# Patient Record
Sex: FEMALE | Race: WHITE | NOT HISPANIC OR LATINO | Employment: OTHER | URBAN - METROPOLITAN AREA
[De-identification: names, ages, dates, MRNs, and addresses within clinical notes are randomized per-mention and may not be internally consistent; named-entity substitution may affect disease eponyms.]

---

## 2019-12-03 ENCOUNTER — HISTORICAL (OUTPATIENT)
Dept: LAB | Facility: HOSPITAL | Age: 74
End: 2019-12-03

## 2019-12-03 LAB
APPEARANCE, UA: ABNORMAL
BACTERIA SPEC CULT: ABNORMAL /HPF
BILIRUB UR QL STRIP: NEGATIVE
COLOR UR: YELLOW
GLUCOSE (UA): NEGATIVE
HGB UR QL STRIP: NEGATIVE
KETONES UR QL STRIP: NEGATIVE
LEUKOCYTE ESTERASE UR QL STRIP: ABNORMAL
NITRITE UR QL STRIP: NEGATIVE
PH UR STRIP: 5 [PH] (ref 5–9)
PROT UR QL STRIP: NEGATIVE
RBC #/AREA URNS HPF: ABNORMAL /[HPF]
SP GR UR STRIP: 1.01 (ref 1–1.03)
SQUAMOUS EPITHELIAL, UA: 6 /HPF (ref 0–4)
UROBILINOGEN UR STRIP-ACNC: 0.2
WBC #/AREA URNS HPF: 22 /HPF (ref 0–3)

## 2019-12-06 LAB — FINAL CULTURE: NORMAL

## 2020-06-10 LAB
BILIRUB SERPL-MCNC: NEGATIVE MG/DL
BLOOD URINE, POC: NEGATIVE
CLARITY, POC UA: CLEAR
COLOR, POC UA: YELLOW
GLUCOSE UR QL STRIP: NEGATIVE
KETONES UR QL STRIP: NEGATIVE
LEUKOCYTE EST, POC UA: NEGATIVE
NITRITE, POC UA: NEGATIVE
PH, POC UA: 6
PROTEIN, POC: NEGATIVE
SPECIFIC GRAVITY, POC UA: 1.01
UROBILINOGEN, POC UA: NORMAL

## 2020-06-11 ENCOUNTER — HISTORICAL (OUTPATIENT)
Dept: ADMINISTRATIVE | Facility: HOSPITAL | Age: 75
End: 2020-06-11

## 2020-06-15 ENCOUNTER — HISTORICAL (OUTPATIENT)
Dept: ADMINISTRATIVE | Facility: HOSPITAL | Age: 75
End: 2020-06-15

## 2021-04-11 ENCOUNTER — HISTORICAL (OUTPATIENT)
Dept: ADMINISTRATIVE | Facility: HOSPITAL | Age: 76
End: 2021-04-11

## 2021-04-28 ENCOUNTER — HISTORICAL (OUTPATIENT)
Dept: ADMINISTRATIVE | Facility: HOSPITAL | Age: 76
End: 2021-04-28

## 2021-06-17 ENCOUNTER — HISTORICAL (OUTPATIENT)
Dept: ADMINISTRATIVE | Facility: HOSPITAL | Age: 76
End: 2021-06-17

## 2021-07-21 ENCOUNTER — HISTORICAL (OUTPATIENT)
Dept: ADMINISTRATIVE | Facility: HOSPITAL | Age: 76
End: 2021-07-21

## 2021-11-28 ENCOUNTER — HISTORICAL (OUTPATIENT)
Dept: ADMINISTRATIVE | Facility: HOSPITAL | Age: 76
End: 2021-11-28

## 2021-12-01 ENCOUNTER — HISTORICAL (OUTPATIENT)
Dept: ADMINISTRATIVE | Facility: HOSPITAL | Age: 76
End: 2021-12-01

## 2022-04-11 ENCOUNTER — HISTORICAL (OUTPATIENT)
Dept: ADMINISTRATIVE | Facility: HOSPITAL | Age: 77
End: 2022-04-11

## 2022-04-29 VITALS
WEIGHT: 190.06 LBS | HEIGHT: 63 IN | BODY MASS INDEX: 33.68 KG/M2 | SYSTOLIC BLOOD PRESSURE: 130 MMHG | DIASTOLIC BLOOD PRESSURE: 90 MMHG

## 2022-09-21 ENCOUNTER — HISTORICAL (OUTPATIENT)
Dept: ADMINISTRATIVE | Facility: HOSPITAL | Age: 77
End: 2022-09-21

## 2022-12-07 ENCOUNTER — HISTORICAL (OUTPATIENT)
Dept: ADMINISTRATIVE | Facility: HOSPITAL | Age: 77
End: 2022-12-07

## 2023-02-24 ENCOUNTER — HOSPITAL ENCOUNTER (OUTPATIENT)
Dept: RADIOLOGY | Facility: HOSPITAL | Age: 78
Discharge: HOME | End: 2023-02-24
Attending: NURSE PRACTITIONER
Payer: MEDICARE

## 2023-02-24 DIAGNOSIS — M25.512 LEFT SHOULDER PAIN: ICD-10-CM

## 2023-02-24 PROCEDURE — 73030 X-RAY EXAM OF SHOULDER: CPT | Mod: TC,LT

## 2023-03-18 ENCOUNTER — HOSPITAL ENCOUNTER (EMERGENCY)
Facility: HOSPITAL | Age: 78
Discharge: HOME | End: 2023-03-18
Payer: MEDICARE

## 2023-03-18 VITALS
SYSTOLIC BLOOD PRESSURE: 161 MMHG | HEART RATE: 60 BPM | TEMPERATURE: 97 F | WEIGHT: 200 LBS | HEIGHT: 64 IN | DIASTOLIC BLOOD PRESSURE: 91 MMHG | BODY MASS INDEX: 34.15 KG/M2 | RESPIRATION RATE: 18 BRPM | OXYGEN SATURATION: 95 %

## 2023-03-18 DIAGNOSIS — I10 PRIMARY HYPERTENSION: Primary | ICD-10-CM

## 2023-03-18 DIAGNOSIS — G89.29 CHRONIC LEFT SHOULDER PAIN: ICD-10-CM

## 2023-03-18 DIAGNOSIS — M25.512 CHRONIC LEFT SHOULDER PAIN: ICD-10-CM

## 2023-03-18 DIAGNOSIS — I10 HYPERTENSION: ICD-10-CM

## 2023-03-18 LAB
ALBUMIN SERPL-MCNC: 4.4 G/DL (ref 3.4–5)
ALBUMIN/GLOB SERPL: 1.5 RATIO
ALP SERPL-CCNC: 113 UNIT/L (ref 50–144)
ALT SERPL-CCNC: 18 UNIT/L (ref 1–45)
ANION GAP SERPL CALC-SCNC: 5 MEQ/L (ref 2–13)
APPEARANCE UR: CLEAR
AST SERPL-CCNC: 25 UNIT/L (ref 14–36)
BACTERIA #/AREA URNS AUTO: NORMAL /HPF
BASOPHILS # BLD AUTO: 0.03 X10(3)/MCL (ref 0.01–0.08)
BASOPHILS NFR BLD AUTO: 0.6 % (ref 0.1–1.2)
BILIRUB UR QL STRIP.AUTO: NEGATIVE MG/DL
BILIRUBIN DIRECT+TOT PNL SERPL-MCNC: 0.6 MG/DL (ref 0–1)
BUN SERPL-MCNC: 18 MG/DL (ref 7–20)
CALCIUM SERPL-MCNC: 9.3 MG/DL (ref 8.4–10.2)
CHLORIDE SERPL-SCNC: 105 MMOL/L (ref 98–110)
CO2 SERPL-SCNC: 31 MMOL/L (ref 21–32)
COLOR UR AUTO: YELLOW
CREAT SERPL-MCNC: 0.96 MG/DL (ref 0.66–1.25)
CREAT/UREA NIT SERPL: 19 (ref 12–20)
EOSINOPHIL # BLD AUTO: 0.16 X10(3)/MCL (ref 0.04–0.36)
EOSINOPHIL NFR BLD AUTO: 3.1 % (ref 0.7–7)
ERYTHROCYTE [DISTWIDTH] IN BLOOD BY AUTOMATED COUNT: 14.3 % (ref 11–14.5)
GFR SERPLBLD CREATININE-BSD FMLA CKD-EPI: 61 MLS/MIN/1.73/M2
GLOBULIN SER-MCNC: 2.9 GM/DL (ref 2–3.9)
GLUCOSE SERPL-MCNC: 101 MG/DL (ref 70–115)
GLUCOSE UR QL STRIP.AUTO: NEGATIVE MG/DL
HCT VFR BLD AUTO: 41.4 % (ref 36–48)
HGB BLD-MCNC: 13.7 G/DL (ref 11.8–16)
IMM GRANULOCYTES # BLD AUTO: 0.04 X10(3)/MCL (ref 0–0.03)
IMM GRANULOCYTES NFR BLD AUTO: 0.8 % (ref 0–0.5)
KETONES UR QL STRIP.AUTO: NEGATIVE MG/DL
LEUKOCYTE ESTERASE UR QL STRIP.AUTO: NEGATIVE UNIT/L
LYMPHOCYTES # BLD AUTO: 1.7 X10(3)/MCL (ref 1.16–3.74)
LYMPHOCYTES NFR BLD AUTO: 32.8 % (ref 20–55)
MCH RBC QN AUTO: 28.5 PG (ref 27–34)
MCV RBC AUTO: 86.1 FL (ref 79–99)
MEAN CELL HEMOGLOBIN CONCENTRATION (OHS) G/DL: 33.1 G/DL (ref 31–37)
MONOCYTES # BLD AUTO: 0.34 X10(3)/MCL (ref 0.24–0.36)
MONOCYTES NFR BLD AUTO: 6.6 % (ref 4.7–12.5)
NEUTROPHILS # BLD AUTO: 2.92 X10(3)/MCL (ref 1.56–6.13)
NEUTROPHILS NFR BLD AUTO: 56.1 % (ref 37–73)
NITRITE UR QL STRIP.AUTO: NEGATIVE
NRBC BLD AUTO-RTO: 0 % (ref 0–1)
PH UR STRIP.AUTO: 6 [PH]
PLATELET # BLD AUTO: 214 X10(3)/MCL (ref 140–371)
PMV BLD AUTO: 10 FL (ref 9.4–12.4)
POTASSIUM SERPL-SCNC: 3.4 MMOL/L (ref 3.5–5.1)
PROT SERPL-MCNC: 7.3 GM/DL (ref 6.3–8.2)
PROT UR QL STRIP.AUTO: NEGATIVE MG/DL
RBC # BLD AUTO: 4.81 X10(6)/MCL (ref 4–5.1)
RBC #/AREA URNS AUTO: NORMAL /HPF
RBC UR QL AUTO: ABNORMAL UNIT/L
SODIUM SERPL-SCNC: 141 MMOL/L (ref 135–145)
SP GR UR STRIP.AUTO: 1.02
SQUAMOUS #/AREA URNS AUTO: NORMAL /HPF
TROPONIN I SERPL-MCNC: 0.02 NG/ML (ref 0–0.03)
UROBILINOGEN UR STRIP-ACNC: 0.2 MG/DL
WBC # SPEC AUTO: 5.2 X10(3)/MCL (ref 4–11.5)
WBC #/AREA URNS AUTO: NORMAL /HPF

## 2023-03-18 PROCEDURE — 99284 EMERGENCY DEPT VISIT MOD MDM: CPT | Mod: 25

## 2023-03-18 PROCEDURE — 85025 COMPLETE CBC W/AUTO DIFF WBC: CPT

## 2023-03-18 PROCEDURE — 96361 HYDRATE IV INFUSION ADD-ON: CPT

## 2023-03-18 PROCEDURE — 96374 THER/PROPH/DIAG INJ IV PUSH: CPT

## 2023-03-18 PROCEDURE — 80053 COMPREHEN METABOLIC PANEL: CPT

## 2023-03-18 PROCEDURE — 81001 URINALYSIS AUTO W/SCOPE: CPT

## 2023-03-18 PROCEDURE — 36415 COLL VENOUS BLD VENIPUNCTURE: CPT

## 2023-03-18 PROCEDURE — 93005 ELECTROCARDIOGRAM TRACING: CPT

## 2023-03-18 PROCEDURE — 93010 EKG 12-LEAD: ICD-10-PCS | Mod: ,,, | Performed by: INTERNAL MEDICINE

## 2023-03-18 PROCEDURE — 84484 ASSAY OF TROPONIN QUANT: CPT

## 2023-03-18 PROCEDURE — 63600175 PHARM REV CODE 636 W HCPCS

## 2023-03-18 PROCEDURE — 96375 TX/PRO/DX INJ NEW DRUG ADDON: CPT

## 2023-03-18 PROCEDURE — 93010 ELECTROCARDIOGRAM REPORT: CPT | Mod: ,,, | Performed by: INTERNAL MEDICINE

## 2023-03-18 PROCEDURE — 25000003 PHARM REV CODE 250

## 2023-03-18 RX ORDER — HYDROCODONE BITARTRATE AND ACETAMINOPHEN 7.5; 325 MG/1; MG/1
1 TABLET ORAL EVERY 6 HOURS PRN
Qty: 12 TABLET | Refills: 0 | Status: ON HOLD | OUTPATIENT
Start: 2023-03-18 | End: 2023-08-04 | Stop reason: HOSPADM

## 2023-03-18 RX ORDER — HYDROMORPHONE HYDROCHLORIDE 1 MG/ML
0.5 INJECTION, SOLUTION INTRAMUSCULAR; INTRAVENOUS; SUBCUTANEOUS
Status: COMPLETED | OUTPATIENT
Start: 2023-03-18 | End: 2023-03-18

## 2023-03-18 RX ORDER — CLONIDINE HYDROCHLORIDE 0.1 MG/1
0.2 TABLET ORAL
Status: COMPLETED | OUTPATIENT
Start: 2023-03-18 | End: 2023-03-18

## 2023-03-18 RX ORDER — SODIUM CHLORIDE 9 MG/ML
1000 INJECTION, SOLUTION INTRAVENOUS
Status: COMPLETED | OUTPATIENT
Start: 2023-03-18 | End: 2023-03-18

## 2023-03-18 RX ORDER — HYDROCODONE BITARTRATE AND ACETAMINOPHEN 7.5; 325 MG/1; MG/1
1 TABLET ORAL EVERY 6 HOURS PRN
Qty: 12 TABLET | Refills: 0 | Status: SHIPPED | OUTPATIENT
Start: 2023-03-18 | End: 2023-03-18 | Stop reason: SDUPTHER

## 2023-03-18 RX ORDER — LORAZEPAM 2 MG/ML
0.5 INJECTION INTRAMUSCULAR
Status: COMPLETED | OUTPATIENT
Start: 2023-03-18 | End: 2023-03-18

## 2023-03-18 RX ADMIN — HYDROMORPHONE HYDROCHLORIDE 0.5 MG: 1 INJECTION, SOLUTION INTRAMUSCULAR; INTRAVENOUS; SUBCUTANEOUS at 02:03

## 2023-03-18 RX ADMIN — SODIUM CHLORIDE 1000 ML: 9 INJECTION, SOLUTION INTRAVENOUS at 02:03

## 2023-03-18 RX ADMIN — LORAZEPAM 0.5 MG: 2 INJECTION INTRAMUSCULAR; INTRAVENOUS at 02:03

## 2023-03-18 RX ADMIN — CLONIDINE HYDROCHLORIDE 0.2 MG: 0.1 TABLET ORAL at 02:03

## 2023-03-18 NOTE — ED PROVIDER NOTES
Encounter Date: 3/18/2023       History     Chief Complaint   Patient presents with    Shoulder Pain     C/o weakness, fatigue and high blood pressure since waking up this morning, also c/o left shoulder pain x 1 month, pt is under care of Dr Sood for shoulder and is awaiting cardiac clearance to get a scope of shoulder down.    Hypertension     78-year-old female presents complaining of elevated blood pressure today.  She has been dealing with a painful left shoulder for over a month.  She is preparing for a arthroscopy from Dr. Sood.  She has not been sleeping well.  She is on no prescription pain medications, due to previous side effects of confusion.  She denies chest pain or shortness of breath.    The history is provided by the patient and a relative.   Review of patient's allergies indicates:  No Known Allergies  Past Medical History:   Diagnosis Date    Fluid retention     Hypercholesterolemia     Hypertension     Pacemaker     Paroxysmal atrial fibrillation      Past Surgical History:   Procedure Laterality Date    HYSTERECTOMY       History reviewed. No pertinent family history.  Social History     Tobacco Use    Smoking status: Never    Smokeless tobacco: Never   Substance Use Topics    Alcohol use: Never    Drug use: Never     Review of Systems   Constitutional:  Positive for activity change.   Respiratory:  Negative for shortness of breath.    Cardiovascular:  Negative for chest pain and palpitations.   Psychiatric/Behavioral:  Positive for sleep disturbance. The patient is nervous/anxious.    All other systems reviewed and are negative.    Physical Exam     Initial Vitals [03/18/23 1349]   BP Pulse Resp Temp SpO2   (!) 184/118 90 18 96.9 °F (36.1 °C) 99 %      MAP       --         Physical Exam    Nursing note and vitals reviewed.  Constitutional: Vital signs are normal. She appears well-developed and well-nourished. She is cooperative.   HENT:   Head: Normocephalic and atraumatic.   Mucous  membranes appear a bit dry   Eyes: Conjunctivae, EOM and lids are normal. Pupils are equal, round, and reactive to light.   Neck: Trachea normal. Neck supple.   Normal range of motion.  Cardiovascular:  Normal rate, regular rhythm, normal heart sounds and intact distal pulses.           Pulmonary/Chest: Breath sounds normal.   Abdominal: Bowel sounds are normal.   Musculoskeletal:      Cervical back: Normal, normal range of motion and neck supple.      Lumbar back: Normal.      Comments: Left shoulder is very painful with any movement.     Neurological: She is alert and oriented to person, place, and time. She has normal strength. Coordination normal.   Skin: Skin is warm, dry and intact. Capillary refill takes less than 2 seconds.   Psychiatric: She has a normal mood and affect. Her speech is normal and behavior is normal. Judgment and thought content normal. Cognition and memory are normal.       ED Course   Procedures  Labs Reviewed   COMPREHENSIVE METABOLIC PANEL - Abnormal; Notable for the following components:       Result Value    Potassium Level 3.4 (*)     All other components within normal limits   URINALYSIS, REFLEX TO URINE CULTURE - Abnormal; Notable for the following components:    Blood, UA Trace-Intact (*)     All other components within normal limits    Narrative:      URINE STABILITY IS 2 HOURS AT ROOM TEMP OR    SIX HOURS REFRIGERATED. PERFORMING TESTING ON    SPECIMENS GREATER THAN THIS AGE MAY AFFECT THE    FOLLOWING TESTS:    PH          SPECIFIC GRAVITY           BLOOD    CLARITY     BILIRUBIN               UROBILINOGEN   CBC WITH DIFFERENTIAL - Abnormal; Notable for the following components:    IG# 0.04 (*)     IG% 0.8 (*)     All other components within normal limits   TROPONIN I - Normal   CBC W/ AUTO DIFFERENTIAL    Narrative:     The following orders were created for panel order CBC auto differential.  Procedure                               Abnormality         Status                      ---------                               -----------         ------                     CBC with Differential[[id number]]        Abnormal            Final result                 Please view results for these tests on the individual orders.   URINALYSIS, MICROSCOPIC        ECG Results              EKG 12-lead (Preliminary result)  Result time 03/18/23 14:35:48      Wet Read by Ron Quijano MD (03/18/23 14:35:48, [facility] Emergency Medicine)    Paced rhythm, heart rate 63, left bundle branch block, no evidence of STEMI                                  Imaging Results    None          Medications   HYDROmorphone injection 0.5 mg (0.5 mg Intravenous Given 3/18/23 1427)   LORazepam injection 0.5 mg (0.5 mg Intravenous Given 3/18/23 1430)   cloNIDine tablet 0.2 mg (0.2 mg Oral Given 3/18/23 1426)   0.9%  NaCl infusion (1,000 mLs Intravenous New Bag 3/18/23 1423)     Medical Decision Making:   Initial Assessment:   Shoulder pain, insomnia, elevated blood pressure  Differential Diagnosis:   Rotator cuff injury, dehydration, anxiety  Clinical Tests:   Lab Tests: Ordered  Medical Tests: Ordered  ED Management:  Clonidine p.o., Dilaudid/Ativan IV, IVF's  EKG, labs  Patient comfortable, blood pressure improved                        Clinical Impression:   Final diagnoses:  [I10] Hypertension  [I10] Primary hypertension (Primary)  [M25.512, G89.29] Chronic left shoulder pain        ED Disposition Condition    Discharge Good          ED Prescriptions       Medication Sig Dispense Start Date End Date Auth. Provider    HYDROcodone-acetaminophen (NORCO) 7.5-325 mg per tablet Take 1 tablet by mouth every 6 (six) hours as needed for Pain. 12 tablet 3/18/2023 -- Ron Quijano MD          Follow-up Information       Follow up With Specialties Details Why Contact Info    PCP  Call in 2 days               Ron Quijano MD  03/18/23 8278

## 2023-04-13 ENCOUNTER — HOSPITAL ENCOUNTER (OUTPATIENT)
Dept: PREADMISSION TESTING | Facility: HOSPITAL | Age: 78
Discharge: HOME | End: 2023-04-13
Payer: MEDICARE

## 2023-04-13 ENCOUNTER — HOSPITAL ENCOUNTER (OUTPATIENT)
Dept: RADIOLOGY | Facility: HOSPITAL | Age: 78
Discharge: HOME | End: 2023-04-13
Attending: SPECIALIST
Payer: MEDICARE

## 2023-04-13 VITALS — HEIGHT: 64 IN | WEIGHT: 200 LBS | BODY MASS INDEX: 34.15 KG/M2

## 2023-04-13 DIAGNOSIS — M25.512 LEFT SHOULDER PAIN, UNSPECIFIED CHRONICITY: Primary | ICD-10-CM

## 2023-04-13 DIAGNOSIS — M25.512 LEFT SHOULDER PAIN, UNSPECIFIED CHRONICITY: ICD-10-CM

## 2023-04-13 LAB
ANION GAP SERPL CALC-SCNC: 5 MEQ/L (ref 2–13)
APPEARANCE UR: ABNORMAL
BACTERIA #/AREA URNS AUTO: ABNORMAL /HPF
BASOPHILS # BLD AUTO: 0.04 X10(3)/MCL (ref 0.01–0.08)
BASOPHILS NFR BLD AUTO: 0.8 % (ref 0.1–1.2)
BILIRUB UR QL STRIP.AUTO: NEGATIVE MG/DL
BUN SERPL-MCNC: 17 MG/DL (ref 7–20)
CALCIUM SERPL-MCNC: 9.3 MG/DL (ref 8.4–10.2)
CHLORIDE SERPL-SCNC: 105 MMOL/L (ref 98–110)
CO2 SERPL-SCNC: 31 MMOL/L (ref 21–32)
COLOR UR AUTO: YELLOW
CREAT SERPL-MCNC: 0.91 MG/DL (ref 0.66–1.25)
CREAT/UREA NIT SERPL: 19 (ref 12–20)
EOSINOPHIL # BLD AUTO: 0.2 X10(3)/MCL (ref 0.04–0.36)
EOSINOPHIL NFR BLD AUTO: 4 % (ref 0.7–7)
ERYTHROCYTE [DISTWIDTH] IN BLOOD BY AUTOMATED COUNT: 14.2 % (ref 11–14.5)
GFR SERPLBLD CREATININE-BSD FMLA CKD-EPI: 65 MLS/MIN/1.73/M2
GLUCOSE SERPL-MCNC: 106 MG/DL (ref 70–115)
GLUCOSE UR QL STRIP.AUTO: NEGATIVE MG/DL
HCT VFR BLD AUTO: 40.4 % (ref 36–48)
HGB BLD-MCNC: 13.1 G/DL (ref 11.8–16)
IMM GRANULOCYTES # BLD AUTO: 0.14 X10(3)/MCL (ref 0–0.03)
IMM GRANULOCYTES NFR BLD AUTO: 2.8 % (ref 0–0.5)
KETONES UR QL STRIP.AUTO: NEGATIVE MG/DL
LEUKOCYTE ESTERASE UR QL STRIP.AUTO: ABNORMAL UNIT/L
LYMPHOCYTES # BLD AUTO: 1.66 X10(3)/MCL (ref 1.16–3.74)
LYMPHOCYTES NFR BLD AUTO: 33.5 % (ref 20–55)
MCH RBC QN AUTO: 27.7 PG (ref 27–34)
MCV RBC AUTO: 85.4 FL (ref 79–99)
MEAN CELL HEMOGLOBIN CONCENTRATION (OHS) G/DL: 32.4 G/DL (ref 31–37)
MONOCYTES # BLD AUTO: 0.32 X10(3)/MCL (ref 0.24–0.36)
MONOCYTES NFR BLD AUTO: 6.5 % (ref 4.7–12.5)
NEUTROPHILS # BLD AUTO: 2.6 X10(3)/MCL (ref 1.56–6.13)
NEUTROPHILS NFR BLD AUTO: 52.4 % (ref 37–73)
NITRITE UR QL STRIP.AUTO: NEGATIVE
NRBC BLD AUTO-RTO: 0 % (ref 0–1)
PH UR STRIP.AUTO: 7 [PH]
PLATELET # BLD AUTO: 205 X10(3)/MCL (ref 140–371)
PMV BLD AUTO: 10.1 FL (ref 9.4–12.4)
POTASSIUM SERPL-SCNC: 3.9 MMOL/L (ref 3.5–5.1)
PROT UR QL STRIP.AUTO: NEGATIVE MG/DL
RBC # BLD AUTO: 4.73 X10(6)/MCL (ref 4–5.1)
RBC UR QL AUTO: NEGATIVE UNIT/L
SODIUM SERPL-SCNC: 141 MMOL/L (ref 135–145)
SP GR UR STRIP.AUTO: 1.02
SQUAMOUS #/AREA URNS AUTO: ABNORMAL /HPF
UROBILINOGEN UR STRIP-ACNC: 0.2 MG/DL
WBC # SPEC AUTO: 5 X10(3)/MCL (ref 4–11.5)
WBC #/AREA URNS AUTO: ABNORMAL /HPF

## 2023-04-13 PROCEDURE — 85025 COMPLETE CBC W/AUTO DIFF WBC: CPT | Performed by: SPECIALIST

## 2023-04-13 PROCEDURE — 80048 BASIC METABOLIC PNL TOTAL CA: CPT | Performed by: SPECIALIST

## 2023-04-13 PROCEDURE — 71046 X-RAY EXAM CHEST 2 VIEWS: CPT | Mod: TC

## 2023-04-13 PROCEDURE — 87088 URINE BACTERIA CULTURE: CPT | Performed by: SPECIALIST

## 2023-04-13 PROCEDURE — 81001 URINALYSIS AUTO W/SCOPE: CPT | Performed by: SPECIALIST

## 2023-04-13 PROCEDURE — 93010 ELECTROCARDIOGRAM REPORT: CPT | Mod: ,,, | Performed by: INTERNAL MEDICINE

## 2023-04-13 PROCEDURE — 93005 ELECTROCARDIOGRAM TRACING: CPT

## 2023-04-13 PROCEDURE — 93010 EKG 12-LEAD: ICD-10-PCS | Mod: ,,, | Performed by: INTERNAL MEDICINE

## 2023-04-13 RX ORDER — DILTIAZEM HYDROCHLORIDE 120 MG/1
120 TABLET, FILM COATED ORAL EVERY 12 HOURS
COMMUNITY
Start: 2023-03-07

## 2023-04-13 RX ORDER — HYDROXYZINE PAMOATE 25 MG/1
50 CAPSULE ORAL 2 TIMES DAILY
COMMUNITY
Start: 2023-03-07

## 2023-04-13 RX ORDER — SOTALOL HYDROCHLORIDE 240 MG/1
240 TABLET ORAL 2 TIMES DAILY
COMMUNITY
Start: 2023-03-07

## 2023-04-13 RX ORDER — SODIUM CHLORIDE, SODIUM LACTATE, POTASSIUM CHLORIDE, CALCIUM CHLORIDE 600; 310; 30; 20 MG/100ML; MG/100ML; MG/100ML; MG/100ML
INJECTION, SOLUTION INTRAVENOUS CONTINUOUS
Status: CANCELLED | OUTPATIENT
Start: 2023-04-21

## 2023-04-13 RX ORDER — RIVAROXABAN 20 MG/1
20 TABLET, FILM COATED ORAL NIGHTLY
COMMUNITY
Start: 2023-03-03

## 2023-04-13 RX ORDER — ICOSAPENT ETHYL 1000 MG/1
1 CAPSULE ORAL 2 TIMES DAILY
COMMUNITY
Start: 2023-03-07

## 2023-04-13 RX ORDER — ESCITALOPRAM OXALATE 10 MG/1
TABLET ORAL
COMMUNITY
Start: 2023-03-07

## 2023-04-13 RX ORDER — BENAZEPRIL HYDROCHLORIDE 20 MG/1
20 TABLET ORAL 2 TIMES DAILY
COMMUNITY
Start: 2023-03-03

## 2023-04-13 RX ORDER — POTASSIUM CHLORIDE 750 MG/1
1 TABLET, EXTENDED RELEASE ORAL DAILY
COMMUNITY
Start: 2023-03-07

## 2023-04-13 RX ORDER — FUROSEMIDE 40 MG/1
40 TABLET ORAL DAILY
COMMUNITY
Start: 2023-03-07

## 2023-04-13 RX ORDER — DIGOXIN 125 MCG
125 TABLET ORAL DAILY
COMMUNITY
Start: 2023-03-07

## 2023-04-13 NOTE — DISCHARGE INSTRUCTIONS

## 2023-04-15 LAB — BACTERIA UR CULT: ABNORMAL

## 2023-05-02 ENCOUNTER — HOSPITAL ENCOUNTER (OUTPATIENT)
Dept: RADIOLOGY | Facility: HOSPITAL | Age: 78
Discharge: HOME | End: 2023-05-02
Attending: SPECIALIST
Payer: MEDICARE

## 2023-05-02 DIAGNOSIS — M75.102 ROTATOR CUFF SYNDROME OF LEFT SHOULDER: ICD-10-CM

## 2023-05-02 PROCEDURE — 73200 CT UPPER EXTREMITY W/O DYE: CPT | Mod: TC,LT

## 2023-05-03 ENCOUNTER — NURSE TRIAGE (OUTPATIENT)
Dept: ADMINISTRATIVE | Facility: CLINIC | Age: 78
End: 2023-05-03
Payer: MEDICARE

## 2023-05-04 ENCOUNTER — HOSPITAL ENCOUNTER (EMERGENCY)
Facility: HOSPITAL | Age: 78
Discharge: HOME | End: 2023-05-04
Attending: FAMILY MEDICINE
Payer: MEDICARE

## 2023-05-04 VITALS
RESPIRATION RATE: 18 BRPM | TEMPERATURE: 98 F | DIASTOLIC BLOOD PRESSURE: 78 MMHG | SYSTOLIC BLOOD PRESSURE: 133 MMHG | BODY MASS INDEX: 33.32 KG/M2 | HEIGHT: 65 IN | OXYGEN SATURATION: 96 % | WEIGHT: 200 LBS | HEART RATE: 88 BPM

## 2023-05-04 DIAGNOSIS — R00.2 PALPITATIONS: ICD-10-CM

## 2023-05-04 DIAGNOSIS — I48.91 ATRIAL FIBRILLATION WITH RVR: Primary | ICD-10-CM

## 2023-05-04 DIAGNOSIS — I48.0 PAROXYSMAL ATRIAL FIBRILLATION: ICD-10-CM

## 2023-05-04 LAB
ALBUMIN SERPL-MCNC: 3.8 G/DL (ref 3.4–5)
ALBUMIN/GLOB SERPL: 1.3 RATIO
ALP SERPL-CCNC: 100 UNIT/L (ref 50–144)
ALT SERPL-CCNC: 22 UNIT/L (ref 1–45)
ANION GAP SERPL CALC-SCNC: 5 MEQ/L (ref 2–13)
AST SERPL-CCNC: 27 UNIT/L (ref 14–36)
BASOPHILS # BLD AUTO: 0.05 X10(3)/MCL (ref 0.01–0.08)
BASOPHILS NFR BLD AUTO: 0.7 % (ref 0.1–1.2)
BILIRUBIN DIRECT+TOT PNL SERPL-MCNC: 0.4 MG/DL (ref 0–1)
BUN SERPL-MCNC: 19 MG/DL (ref 7–20)
CALCIUM SERPL-MCNC: 9.5 MG/DL (ref 8.4–10.2)
CHLORIDE SERPL-SCNC: 107 MMOL/L (ref 98–110)
CO2 SERPL-SCNC: 30 MMOL/L (ref 21–32)
CREAT SERPL-MCNC: 1.13 MG/DL (ref 0.66–1.25)
CREAT/UREA NIT SERPL: 17 (ref 12–20)
EOSINOPHIL # BLD AUTO: 0.22 X10(3)/MCL (ref 0.04–0.36)
EOSINOPHIL NFR BLD AUTO: 3.2 % (ref 0.7–7)
ERYTHROCYTE [DISTWIDTH] IN BLOOD BY AUTOMATED COUNT: 14.6 % (ref 11–14.5)
GFR SERPLBLD CREATININE-BSD FMLA CKD-EPI: 50 MLS/MIN/1.73/M2
GLOBULIN SER-MCNC: 3 GM/DL (ref 2–3.9)
GLUCOSE SERPL-MCNC: 114 MG/DL (ref 70–115)
HCT VFR BLD AUTO: 40 % (ref 36–48)
HGB BLD-MCNC: 13.1 G/DL (ref 11.8–16)
IMM GRANULOCYTES # BLD AUTO: 0.06 X10(3)/MCL (ref 0–0.03)
IMM GRANULOCYTES NFR BLD AUTO: 0.9 % (ref 0–0.5)
LYMPHOCYTES # BLD AUTO: 1.97 X10(3)/MCL (ref 1.16–3.74)
LYMPHOCYTES NFR BLD AUTO: 28.7 % (ref 20–55)
MCH RBC QN AUTO: 28.1 PG (ref 27–34)
MCHC RBC AUTO-ENTMCNC: 32.8 G/DL (ref 31–37)
MCV RBC AUTO: 85.7 FL (ref 79–99)
MONOCYTES # BLD AUTO: 0.31 X10(3)/MCL (ref 0.24–0.36)
MONOCYTES NFR BLD AUTO: 4.5 % (ref 4.7–12.5)
NEUTROPHILS # BLD AUTO: 4.26 X10(3)/MCL (ref 1.56–6.13)
NEUTROPHILS NFR BLD AUTO: 62 % (ref 37–73)
NRBC BLD AUTO-RTO: 0 % (ref 0–1)
PLATELET # BLD AUTO: 206 X10(3)/MCL (ref 140–371)
PMV BLD AUTO: 10 FL (ref 9.4–12.4)
POTASSIUM SERPL-SCNC: 3.5 MMOL/L (ref 3.5–5.1)
PROT SERPL-MCNC: 6.8 GM/DL (ref 6.3–8.2)
RBC # BLD AUTO: 4.67 X10(6)/MCL (ref 4–5.1)
SODIUM SERPL-SCNC: 142 MMOL/L (ref 135–145)
TROPONIN I SERPL-MCNC: 0.02 NG/ML (ref 0–0.03)
WBC # SPEC AUTO: 6.87 X10(3)/MCL (ref 4–11.5)

## 2023-05-04 PROCEDURE — 84484 ASSAY OF TROPONIN QUANT: CPT | Performed by: FAMILY MEDICINE

## 2023-05-04 PROCEDURE — 80053 COMPREHEN METABOLIC PANEL: CPT | Performed by: FAMILY MEDICINE

## 2023-05-04 PROCEDURE — 36415 COLL VENOUS BLD VENIPUNCTURE: CPT | Performed by: FAMILY MEDICINE

## 2023-05-04 PROCEDURE — 99284 EMERGENCY DEPT VISIT MOD MDM: CPT | Mod: 25

## 2023-05-04 PROCEDURE — 85025 COMPLETE CBC W/AUTO DIFF WBC: CPT | Performed by: FAMILY MEDICINE

## 2023-05-04 NOTE — TELEPHONE ENCOUNTER
Reports feeling as if heart is racing with pulse ox giving many different readings. +Weakness. Reports trip and fall earlier today. Advised, per protocol. Verbalizes understanding.     Reason for Disposition   Unable to walk, or can only walk with assistance (e.g., requires support)    Additional Information   Negative: Passed out (i.e., lost consciousness, collapsed and was not responding)   Negative: Shock suspected (e.g., cold/pale/clammy skin, too weak to stand, low BP, rapid pulse)   Negative: Difficult to awaken or acting confused (e.g., disoriented, slurred speech)   Negative: Visible sweat on face or sweat dripping down face    Protocols used: Heart Rate and Heartbeat [id number]

## 2023-05-04 NOTE — ED PROVIDER NOTES
Encounter Date: 5/3/2023       History     Chief Complaint   Patient presents with    Palpitations     C/O PALPITATIONS AT HOME PTA. PT GIVEN 300MG AMIODARONE IV BY EMS.      78-year-old white female brought to emergency room per [facility] complaining of heart palpitations prior to admission.  Patient denies any chest pain or shortness of breath.  Patient denies any nausea vomiting diaphoresis.  Patient states he is got a history of atrial fibrillation and also has a pacemaker.  Patient states she is taking her medications.  Patient states she feels better upon arrival to the emergency room but does feel the palpitations off and on.    The history is provided by the patient and the EMS personnel.   Review of patient's allergies indicates:  No Known Allergies  Past Medical History:   Diagnosis Date    Fluid retention     Hypercholesterolemia     Hypertension     Pacemaker     Paroxysmal atrial fibrillation      Past Surgical History:   Procedure Laterality Date    HYSTERECTOMY      JOINT REPLACEMENT Right     X2     No family history on file.  Social History     Tobacco Use    Smoking status: Never    Smokeless tobacco: Never   Substance Use Topics    Alcohol use: Never    Drug use: Never     Review of Systems   Respiratory:  Negative for shortness of breath.    Cardiovascular:  Positive for palpitations. Negative for chest pain.   All other systems reviewed and are negative.    Physical Exam     Initial Vitals [05/04/23 0000]   BP Pulse Resp Temp SpO2   117/82 96 18 98.2 °F (36.8 °C) 99 %      MAP       --         Physical Exam    Nursing note and vitals reviewed.  Constitutional:   Pleasant white female alert in no acute distress.   HENT:   Head is atraumatic and normocephalic.  Oropharynx is clear with moist mucous membranes.  Nose is clear with no discharge.   Neck:   Neck is supple with full range of motion with no cervical lymphadenopathy.   Cardiovascular:            Heart with irregular irregular rate and  rhythm no murmur.   Pulmonary/Chest:   Lungs are clear to auscultation bilaterally.   Abdominal:   Abdomen with positive bowel sounds throughout.  Abdomen is soft and nontender with no guarding or rebound.   Musculoskeletal:      Comments: Extremities with good range of motion with no clubbing cyanosis or edema.     Neurological:   Patient is alert and oriented x3.  Bilateral upper and lower extremities with normal strength and sensation.   Skin:   Skin is warm and dry.   Psychiatric: She has a normal mood and affect. Her behavior is normal. Thought content normal.       ED Course   Procedures  Labs Reviewed   CBC WITH DIFFERENTIAL - Abnormal; Notable for the following components:       Result Value    RDW 14.6 (*)     Mono % 4.5 (*)     IG# 0.06 (*)     IG% 0.9 (*)     All other components within normal limits   TROPONIN I - Normal   CBC W/ AUTO DIFFERENTIAL    Narrative:     The following orders were created for panel order CBC auto differential.  Procedure                               Abnormality         Status                     ---------                               -----------         ------                     CBC with Differential[[id number]]        Abnormal            Final result                 Please view results for these tests on the individual orders.   COMPREHENSIVE METABOLIC PANEL     EKG Readings: (Independently Interpreted)   Initial Reading: No STEMI. Previous EKG: Compared with most recent EKG Rhythm: Atrial Fibrillation. Heart Rate: 93. Ectopy: No Ectopy. Conduction: LBBB. Axis: Left Axis Deviation. Other Findings: Prolonged QT Interval. Clinical Impression: Atrial Fibrillation with LBBB     Imaging Results              X-Ray Chest AP Portable (Preliminary result)  Result time 05/04/23 01:05:53      Wet Read by Gregory Saldaña MD (05/04/23 01:05:53, [facility])    No obvious infiltrates noted.                                     Medications -  No data to display  Medical Decision Making:   Initial Assessment:   Paroxysmal atrial fibrillation.  Differential Diagnosis:   Pacemaker firing,  Clinical Tests:   Lab Tests: Ordered and Reviewed  The following lab test(s) were unremarkable: CBC, CMP and Troponin  Radiological Study: Ordered and Reviewed  Medical Tests: Ordered and Reviewed  ED Management:  Patient states he is resting comfortably and has no pain or palpitations this time.  We will review x-ray results and lab results with patient.  Patient states he is comfortable going home at this time and will follow up with Dr. Mckeon her cardiologist in [place].  Patient instructed to return to the ER for any problems.                        Clinical Impression:   Final diagnoses:  [R00.2] Palpitations  [I48.91] Atrial fibrillation with RVR (Primary)  [I48.0] Paroxysmal atrial fibrillation        ED Disposition Condition    Discharge Stable          ED Prescriptions    None       Follow-up Information       Follow up With Specialties Details Why Contact Info    Mery Sanon NP Family Medicine Schedule an appointment as soon as possible for a visit   [address] [place] [address] [phone]      Follow up with Dr. Mckeon cardiologist.                 Gregory Saldaña MD  05/04/23 0108

## 2023-08-01 ENCOUNTER — HOSPITAL ENCOUNTER (OUTPATIENT)
Dept: RADIOLOGY | Facility: HOSPITAL | Age: 78
Discharge: HOME | End: 2023-08-01
Attending: SPECIALIST
Payer: MEDICARE

## 2023-08-01 ENCOUNTER — HOSPITAL ENCOUNTER (OUTPATIENT)
Dept: PREADMISSION TESTING | Facility: HOSPITAL | Age: 78
Discharge: HOME | End: 2023-08-01
Attending: SPECIALIST
Payer: MEDICARE

## 2023-08-01 VITALS — BODY MASS INDEX: 33.32 KG/M2 | WEIGHT: 200 LBS | HEIGHT: 65 IN

## 2023-08-01 DIAGNOSIS — Z01.818 PRE-OP EXAM: Primary | ICD-10-CM

## 2023-08-01 LAB
ANION GAP SERPL CALC-SCNC: 9 MEQ/L (ref 2–13)
APPEARANCE UR: CLEAR
BASOPHILS # BLD AUTO: 0.03 X10(3)/MCL (ref 0.01–0.08)
BASOPHILS NFR BLD AUTO: 0.6 % (ref 0.1–1.2)
BILIRUB UR QL STRIP.AUTO: NEGATIVE
BUN SERPL-MCNC: 14 MG/DL (ref 7–20)
CALCIUM SERPL-MCNC: 9.9 MG/DL (ref 8.4–10.2)
CHLORIDE SERPL-SCNC: 102 MMOL/L (ref 98–110)
CO2 SERPL-SCNC: 30 MMOL/L (ref 21–32)
COLOR UR: YELLOW
CREAT SERPL-MCNC: 0.92 MG/DL (ref 0.66–1.25)
CREAT/UREA NIT SERPL: 15 (ref 12–20)
EOSINOPHIL # BLD AUTO: 0.16 X10(3)/MCL (ref 0.04–0.36)
EOSINOPHIL NFR BLD AUTO: 3.3 % (ref 0.7–7)
ERYTHROCYTE [DISTWIDTH] IN BLOOD BY AUTOMATED COUNT: 14.7 % (ref 11–14.5)
GFR SERPLBLD CREATININE-BSD FMLA CKD-EPI: 64 MLS/MIN/1.73/M2
GLUCOSE SERPL-MCNC: 106 MG/DL (ref 70–115)
GLUCOSE UR QL STRIP.AUTO: NEGATIVE
HCT VFR BLD AUTO: 40.1 % (ref 36–48)
HGB BLD-MCNC: 13.1 G/DL (ref 11.8–16)
IMM GRANULOCYTES # BLD AUTO: 0.04 X10(3)/MCL (ref 0–0.03)
IMM GRANULOCYTES NFR BLD AUTO: 0.8 % (ref 0–0.5)
KETONES UR QL STRIP.AUTO: NEGATIVE
LEUKOCYTE ESTERASE UR QL STRIP.AUTO: NEGATIVE
LYMPHOCYTES # BLD AUTO: 1.65 X10(3)/MCL (ref 1.16–3.74)
LYMPHOCYTES NFR BLD AUTO: 33.7 % (ref 20–55)
MCH RBC QN AUTO: 28.1 PG (ref 27–34)
MCHC RBC AUTO-ENTMCNC: 32.7 G/DL (ref 31–37)
MCV RBC AUTO: 85.9 FL (ref 79–99)
MONOCYTES # BLD AUTO: 0.37 X10(3)/MCL (ref 0.24–0.36)
MONOCYTES NFR BLD AUTO: 7.6 % (ref 4.7–12.5)
NEUTROPHILS # BLD AUTO: 2.65 X10(3)/MCL (ref 1.56–6.13)
NEUTROPHILS NFR BLD AUTO: 54 % (ref 37–73)
NITRITE UR QL STRIP.AUTO: NEGATIVE
NRBC BLD AUTO-RTO: 0 %
PH UR STRIP.AUTO: 6.5 [PH]
PLATELET # BLD AUTO: 203 X10(3)/MCL (ref 140–371)
PMV BLD AUTO: 10.1 FL (ref 9.4–12.4)
POTASSIUM SERPL-SCNC: 3.7 MMOL/L (ref 3.5–5.1)
PROT UR QL STRIP.AUTO: NEGATIVE
RBC # BLD AUTO: 4.67 X10(6)/MCL (ref 4–5.1)
RBC UR QL AUTO: NEGATIVE
SODIUM SERPL-SCNC: 141 MMOL/L (ref 135–145)
SP GR UR STRIP.AUTO: 1.02
UROBILINOGEN UR STRIP-ACNC: 0.2
WBC # SPEC AUTO: 4.9 X10(3)/MCL (ref 4–11.5)

## 2023-08-01 PROCEDURE — 93005 ELECTROCARDIOGRAM TRACING: CPT

## 2023-08-01 PROCEDURE — 93010 EKG 12-LEAD: ICD-10-PCS | Mod: ,,, | Performed by: INTERNAL MEDICINE

## 2023-08-01 PROCEDURE — 81003 URINALYSIS AUTO W/O SCOPE: CPT | Performed by: SPECIALIST

## 2023-08-01 PROCEDURE — 80048 BASIC METABOLIC PNL TOTAL CA: CPT | Performed by: SPECIALIST

## 2023-08-01 PROCEDURE — 85025 COMPLETE CBC W/AUTO DIFF WBC: CPT | Performed by: SPECIALIST

## 2023-08-01 PROCEDURE — 71046 X-RAY EXAM CHEST 2 VIEWS: CPT | Mod: TC

## 2023-08-01 PROCEDURE — 93010 ELECTROCARDIOGRAM REPORT: CPT | Mod: ,,, | Performed by: INTERNAL MEDICINE

## 2023-08-01 RX ORDER — ACETAMINOPHEN 500 MG
1000 TABLET ORAL NIGHTLY
COMMUNITY

## 2023-08-01 NOTE — DISCHARGE INSTRUCTIONS

## 2023-08-03 ENCOUNTER — ANESTHESIA EVENT (OUTPATIENT)
Dept: SURGERY | Facility: HOSPITAL | Age: 78
End: 2023-08-03
Payer: MEDICARE

## 2023-08-03 NOTE — ANESTHESIA PREPROCEDURE EVALUATION
08/03/2023  Patsy H Cantu is a 78 y.o., female.      Pre-op Assessment    I have reviewed the Patient Summary Reports.     I have reviewed the Nursing Notes. I have reviewed the NPO Status.   I have reviewed the Medications.     Review of Systems  Anesthesia Hx:  No problems with previous Anesthesia  Denies Family Hx of Anesthesia complications.   Denies Personal Hx of Anesthesia complications.   Hematology/Oncology:  Hematology Normal   Oncology Normal     EENT/Dental:EENT/Dental Normal   Cardiovascular:   Exercise tolerance: poor Pacemaker Hypertension Dysrhythmias atrial fibrillation hyperlipidemia    Pulmonary:   Sleep Apnea    Renal/:   Chronic Renal Disease, CKD    Hepatic/GI:  Hepatic/GI Normal    Musculoskeletal:  Musculoskeletal Normal    Neurological:   TIA,    Endocrine:  Endocrine Normal  Obesity / BMI > 30  Dermatological:  Skin Normal    Psych:   anxiety depression          Physical Exam  General: Well nourished, Cooperative, Alert and Oriented    Airway:  Mallampati: II / II  Mouth Opening: Normal  TM Distance: Normal  Tongue: Large  Neck ROM: Normal ROM    Dental:  Dentures        Anesthesia Plan  Type of Anesthesia, risks & benefits discussed:    Anesthesia Type: Gen ETT  Intra-op Monitoring Plan: Standard ASA Monitors  Post Op Pain Control Plan: multimodal analgesia  Induction:  IV  Airway Plan: Direct  Informed Consent: Informed consent signed with the Patient and all parties understand the risks and agree with anesthesia plan.  All questions answered. Patient consented to blood products? Yes  ASA Score: 4  Day of Surgery Review of History & Physical: H&P Update referred to the surgeon/provider.I have interviewed and examined the patient. I have reviewed the patient's H&P dated: There are no significant changes.     Ready For Surgery From Anesthesia Perspective.     .

## 2023-08-04 ENCOUNTER — ANESTHESIA (OUTPATIENT)
Dept: SURGERY | Facility: HOSPITAL | Age: 78
End: 2023-08-04
Payer: MEDICARE

## 2023-08-04 ENCOUNTER — HOSPITAL ENCOUNTER (OUTPATIENT)
Facility: HOSPITAL | Age: 78
Discharge: HOME | End: 2023-08-04
Attending: SPECIALIST | Admitting: SPECIALIST
Payer: MEDICARE

## 2023-08-04 VITALS
HEART RATE: 62 BPM | WEIGHT: 200 LBS | RESPIRATION RATE: 18 BRPM | SYSTOLIC BLOOD PRESSURE: 136 MMHG | BODY MASS INDEX: 33.28 KG/M2 | TEMPERATURE: 98 F | DIASTOLIC BLOOD PRESSURE: 78 MMHG | OXYGEN SATURATION: 98 %

## 2023-08-04 DIAGNOSIS — M25.512 LEFT SHOULDER PAIN, UNSPECIFIED CHRONICITY: ICD-10-CM

## 2023-08-04 PROBLEM — M75.102 ROTATOR CUFF SYNDROME OF LEFT SHOULDER: Status: ACTIVE | Noted: 2023-08-04

## 2023-08-04 PROCEDURE — 71000033 HC RECOVERY, INTIAL HOUR: Performed by: SPECIALIST

## 2023-08-04 PROCEDURE — 63600175 PHARM REV CODE 636 W HCPCS: Performed by: SPECIALIST

## 2023-08-04 PROCEDURE — 25000003 PHARM REV CODE 250: Performed by: SPECIALIST

## 2023-08-04 PROCEDURE — 71000016 HC POSTOP RECOV ADDL HR: Performed by: SPECIALIST

## 2023-08-04 PROCEDURE — 25000003 PHARM REV CODE 250: Performed by: NURSE ANESTHETIST, CERTIFIED REGISTERED

## 2023-08-04 PROCEDURE — 71000015 HC POSTOP RECOV 1ST HR: Performed by: SPECIALIST

## 2023-08-04 PROCEDURE — 37000008 HC ANESTHESIA 1ST 15 MINUTES: Performed by: SPECIALIST

## 2023-08-04 PROCEDURE — D9220A PRA ANESTHESIA: Mod: ,,, | Performed by: NURSE ANESTHETIST, CERTIFIED REGISTERED

## 2023-08-04 PROCEDURE — 63600175 PHARM REV CODE 636 W HCPCS

## 2023-08-04 PROCEDURE — 27201423 OPTIME MED/SURG SUP & DEVICES STERILE SUPPLY: Performed by: SPECIALIST

## 2023-08-04 PROCEDURE — C1713 ANCHOR/SCREW BN/BN,TIS/BN: HCPCS | Performed by: SPECIALIST

## 2023-08-04 PROCEDURE — 36000710: Performed by: SPECIALIST

## 2023-08-04 PROCEDURE — D9220A PRA ANESTHESIA: ICD-10-PCS | Mod: ,,, | Performed by: NURSE ANESTHETIST, CERTIFIED REGISTERED

## 2023-08-04 PROCEDURE — 37000009 HC ANESTHESIA EA ADD 15 MINS: Performed by: SPECIALIST

## 2023-08-04 PROCEDURE — 36000711: Performed by: SPECIALIST

## 2023-08-04 PROCEDURE — 63600175 PHARM REV CODE 636 W HCPCS: Performed by: NURSE ANESTHETIST, CERTIFIED REGISTERED

## 2023-08-04 DEVICE — ANCHOR VERSALOK W/ORTHOCORD: Type: IMPLANTABLE DEVICE | Site: SHOULDER | Status: FUNCTIONAL

## 2023-08-04 RX ORDER — KETOROLAC TROMETHAMINE 30 MG/ML
INJECTION, SOLUTION INTRAMUSCULAR; INTRAVENOUS
Status: DISCONTINUED | OUTPATIENT
Start: 2023-08-04 | End: 2023-08-04

## 2023-08-04 RX ORDER — VECURONIUM BROMIDE FOR INJECTION 1 MG/ML
INJECTION, POWDER, LYOPHILIZED, FOR SOLUTION INTRAVENOUS
Status: DISCONTINUED | OUTPATIENT
Start: 2023-08-04 | End: 2023-08-04

## 2023-08-04 RX ORDER — ONDANSETRON 2 MG/ML
INJECTION INTRAMUSCULAR; INTRAVENOUS
Status: DISCONTINUED | OUTPATIENT
Start: 2023-08-04 | End: 2023-08-04

## 2023-08-04 RX ORDER — BUPIVACAINE HYDROCHLORIDE 2.5 MG/ML
INJECTION, SOLUTION EPIDURAL; INFILTRATION; INTRACAUDAL
Status: DISCONTINUED | OUTPATIENT
Start: 2023-08-04 | End: 2023-08-04 | Stop reason: HOSPADM

## 2023-08-04 RX ORDER — EPINEPHRINE 1 MG/ML
INJECTION, SOLUTION, CONCENTRATE INTRAVENOUS
Status: DISCONTINUED | OUTPATIENT
Start: 2023-08-04 | End: 2023-08-04 | Stop reason: HOSPADM

## 2023-08-04 RX ORDER — ETOMIDATE 2 MG/ML
INJECTION INTRAVENOUS
Status: DISCONTINUED | OUTPATIENT
Start: 2023-08-04 | End: 2023-08-04

## 2023-08-04 RX ORDER — SODIUM CHLORIDE, SODIUM LACTATE, POTASSIUM CHLORIDE, CALCIUM CHLORIDE 600; 310; 30; 20 MG/100ML; MG/100ML; MG/100ML; MG/100ML
INJECTION, SOLUTION INTRAVENOUS CONTINUOUS
Status: DISCONTINUED | OUTPATIENT
Start: 2023-08-04 | End: 2023-08-04 | Stop reason: HOSPADM

## 2023-08-04 RX ORDER — MIDAZOLAM HYDROCHLORIDE 1 MG/ML
1.5 INJECTION INTRAMUSCULAR; INTRAVENOUS
Status: COMPLETED | OUTPATIENT
Start: 2023-08-04 | End: 2023-08-04

## 2023-08-04 RX ORDER — LIDOCAINE HYDROCHLORIDE 20 MG/ML
INJECTION INTRAVENOUS
Status: DISCONTINUED | OUTPATIENT
Start: 2023-08-04 | End: 2023-08-04

## 2023-08-04 RX ORDER — ACETAMINOPHEN 325 MG/1
650 TABLET ORAL EVERY 4 HOURS PRN
Status: CANCELLED | OUTPATIENT
Start: 2023-08-04

## 2023-08-04 RX ORDER — FAMOTIDINE 20 MG/1
20 TABLET, FILM COATED ORAL
Status: COMPLETED | OUTPATIENT
Start: 2023-08-04 | End: 2023-08-04

## 2023-08-04 RX ORDER — KETOROLAC TROMETHAMINE 30 MG/ML
15 INJECTION, SOLUTION INTRAMUSCULAR; INTRAVENOUS EVERY 6 HOURS PRN
Status: CANCELLED | OUTPATIENT
Start: 2023-08-04 | End: 2023-08-07

## 2023-08-04 RX ORDER — HYDRALAZINE HYDROCHLORIDE 50 MG/1
50 TABLET, FILM COATED ORAL 2 TIMES DAILY
COMMUNITY

## 2023-08-04 RX ORDER — HYDROCODONE BITARTRATE AND ACETAMINOPHEN 7.5; 325 MG/1; MG/1
1 TABLET ORAL EVERY 6 HOURS PRN
Qty: 28 TABLET | Refills: 0 | Status: SHIPPED | OUTPATIENT
Start: 2023-08-04

## 2023-08-04 RX ORDER — MORPHINE SULFATE 4 MG/ML
3 INJECTION, SOLUTION INTRAMUSCULAR; INTRAVENOUS
Status: CANCELLED | OUTPATIENT
Start: 2023-08-04

## 2023-08-04 RX ORDER — ONDANSETRON 2 MG/ML
4 INJECTION INTRAMUSCULAR; INTRAVENOUS EVERY 12 HOURS PRN
Status: CANCELLED | OUTPATIENT
Start: 2023-08-04

## 2023-08-04 RX ORDER — PROCHLORPERAZINE EDISYLATE 5 MG/ML
5 INJECTION INTRAMUSCULAR; INTRAVENOUS EVERY 6 HOURS PRN
Status: CANCELLED | OUTPATIENT
Start: 2023-08-04

## 2023-08-04 RX ORDER — LABETALOL HYDROCHLORIDE 5 MG/ML
INJECTION, SOLUTION INTRAVENOUS
Status: DISCONTINUED | OUTPATIENT
Start: 2023-08-04 | End: 2023-08-04

## 2023-08-04 RX ORDER — NEOSTIGMINE METHYLSULFATE 1 MG/ML
INJECTION, SOLUTION INTRAVENOUS
Status: DISCONTINUED | OUTPATIENT
Start: 2023-08-04 | End: 2023-08-04

## 2023-08-04 RX ORDER — CEFADROXIL 500 MG/1
500 CAPSULE ORAL EVERY 12 HOURS
Qty: 20 CAPSULE | Refills: 0 | Status: SHIPPED | OUTPATIENT
Start: 2023-08-04 | End: 2023-08-14

## 2023-08-04 RX ORDER — DEXAMETHASONE SODIUM PHOSPHATE 4 MG/ML
INJECTION, SOLUTION INTRA-ARTICULAR; INTRALESIONAL; INTRAMUSCULAR; INTRAVENOUS; SOFT TISSUE
Status: DISCONTINUED | OUTPATIENT
Start: 2023-08-04 | End: 2023-08-04

## 2023-08-04 RX ORDER — SODIUM CHLORIDE 0.9 G/100ML
IRRIGANT IRRIGATION
Status: DISCONTINUED | OUTPATIENT
Start: 2023-08-04 | End: 2023-08-04 | Stop reason: HOSPADM

## 2023-08-04 RX ORDER — FENTANYL CITRATE 50 UG/ML
INJECTION, SOLUTION INTRAMUSCULAR; INTRAVENOUS
Status: DISCONTINUED | OUTPATIENT
Start: 2023-08-04 | End: 2023-08-04

## 2023-08-04 RX ORDER — TRIAMCINOLONE ACETONIDE 40 MG/ML
INJECTION, SUSPENSION INTRA-ARTICULAR; INTRAMUSCULAR
Status: DISCONTINUED | OUTPATIENT
Start: 2023-08-04 | End: 2023-08-04 | Stop reason: HOSPADM

## 2023-08-04 RX ADMIN — LABETALOL HYDROCHLORIDE 5 MG: 5 INJECTION, SOLUTION INTRAVENOUS at 08:08

## 2023-08-04 RX ADMIN — LABETALOL HYDROCHLORIDE 5 MG: 5 INJECTION, SOLUTION INTRAVENOUS at 07:08

## 2023-08-04 RX ADMIN — ETOMIDATE 20 MG: 2 INJECTION INTRAVENOUS at 07:08

## 2023-08-04 RX ADMIN — FENTANYL CITRATE 50 MCG: 50 INJECTION, SOLUTION INTRAMUSCULAR; INTRAVENOUS at 08:08

## 2023-08-04 RX ADMIN — FENTANYL CITRATE 50 MCG: 50 INJECTION, SOLUTION INTRAMUSCULAR; INTRAVENOUS at 07:08

## 2023-08-04 RX ADMIN — NEOSTIGMINE METHYLSULFATE 3 MG: 0.5 INJECTION INTRAVENOUS at 09:08

## 2023-08-04 RX ADMIN — GLYCOPYRROLATE 0.1 MG: 0.2 INJECTION, SOLUTION INTRAMUSCULAR; INTRAVITREAL at 06:08

## 2023-08-04 RX ADMIN — GLYCOPYRROLATE 0.4 MG: 0.2 INJECTION, SOLUTION INTRAMUSCULAR; INTRAVITREAL at 09:08

## 2023-08-04 RX ADMIN — FAMOTIDINE 20 MG: 20 TABLET, FILM COATED ORAL at 06:08

## 2023-08-04 RX ADMIN — DEXAMETHASONE SODIUM PHOSPHATE 4 MG: 4 INJECTION, SOLUTION INTRA-ARTICULAR; INTRALESIONAL; INTRAMUSCULAR; INTRAVENOUS; SOFT TISSUE at 07:08

## 2023-08-04 RX ADMIN — VECURONIUM BROMIDE 6 MG: 10 INJECTION, POWDER, FOR SOLUTION INTRAVENOUS at 07:08

## 2023-08-04 RX ADMIN — MIDAZOLAM HYDROCHLORIDE 1.5 MG: 1 INJECTION, SOLUTION INTRAMUSCULAR; INTRAVENOUS at 07:08

## 2023-08-04 RX ADMIN — KETOROLAC TROMETHAMINE 15 MG: 30 INJECTION, SOLUTION INTRAMUSCULAR; INTRAVENOUS at 09:08

## 2023-08-04 RX ADMIN — ONDANSETRON 4 MG: 2 INJECTION INTRAMUSCULAR; INTRAVENOUS at 07:08

## 2023-08-04 RX ADMIN — CEFAZOLIN 2 G: 2 INJECTION, POWDER, FOR SOLUTION INTRAMUSCULAR; INTRAVENOUS at 07:08

## 2023-08-04 RX ADMIN — SODIUM CHLORIDE, POTASSIUM CHLORIDE, SODIUM LACTATE AND CALCIUM CHLORIDE: 600; 310; 30; 20 INJECTION, SOLUTION INTRAVENOUS at 06:08

## 2023-08-04 RX ADMIN — LIDOCAINE HYDROCHLORIDE 50 MG: 20 INJECTION, SOLUTION INTRAVENOUS at 07:08

## 2023-08-04 NOTE — PATIENT INSTRUCTIONS
Patient is to keep her sling and abductor pillow in place.  I have given them instructions on how to remove for showering.  She was not to raise her arm overhead.  She was to leave her dressing in place.

## 2023-08-04 NOTE — DISCHARGE INSTRUCTIONS
Albert Sood M.D.     Orthopedic Surgery/Sports Medicine     [address] [address]     [place] [id number]     Office: [phone]          ROTATOR CUFF REPAIR POST-OP INSTRUCTIONS          1.  You must remain in abduction pillow due to repair of tendon.  When you bathe           or change clothes, you may remove abduction pillow but you must keep arm           elevated.  For Bathing, you may place plastic bowl under arm.     2.  Keep dressing over incision for two days.     3.  Remove bandage from surgery site two days after surgery unless instructed           otherwise.       4.  You must keep a sterile bandage or gauze over surgery site until there is no          drainage from wound.     5.  Wash incision with antibacterial soap and water.      6.  Refrain from using hydrogen peroxide or antibiotic ointments unless instructed           to do so.     7.  Do not soak incision in bathtub.     8.  Call Dr. Sood or notify staff in case of fever greater than 101.5, excessive           drainage from surgery site, redness or severe pain.     9.  Physical therapy will start sometime after your first appointment following            surgery.

## 2023-08-04 NOTE — ANESTHESIA POSTPROCEDURE EVALUATION
Anesthesia Post Evaluation    Patient: Emerald CURTIS Cantu    Procedure(s) Performed: Procedure(s) (LRB):  ARTHROSCOPY, SHOULDER (Left)    Final Anesthesia Type: general      Patient location during evaluation: PACU  Patient participation: Yes- Able to Participate  Level of consciousness: awake and alert, awake and oriented  Post-procedure vital signs: reviewed and stable  Pain management: adequate  Airway patency: patent    PONV status at discharge: No PONV  Anesthetic complications: no      Cardiovascular status: blood pressure returned to baseline  Respiratory status: unassisted, room air and spontaneous ventilation  Hydration status: euvolemic  Follow-up not needed.          Vitals Value Taken Time   /89 08/04/23 0627   Temp 36.6 °C (97.8 °F) 08/04/23 0627   Pulse 72 08/04/23 0926   Resp 20 08/04/23 0627   SpO2 97 % 08/04/23 0926   Vitals shown include unvalidated device data.      No case tracking events are documented in the log.      Pain/Gege Score: No data recorded

## 2023-08-04 NOTE — ANESTHESIA PROCEDURE NOTES
Intubation    Date/Time: 8/4/2023 7:45 AM    Performed by: Albert Mcneil CRNA  Authorized by: Albert Mcneil CRNA    Intubation:     Induction:  Intravenous    Intubated:  Postinduction    Mask Ventilation:  Easy mask    Attempts:  1    Attempted By:  CRNA    Method of Intubation:  Direct    Blade:  Alex 2    Laryngeal View Grade: Grade I - full view of cords      Difficult Airway Encountered?: No      Complications:  None    Airway Device:  Oral endotracheal tube    Airway Device Size:  7.0    Style/Cuff Inflation:  Cuffed    Inflation Amount (mL):  5    Tube secured:  21    Secured at:  The lips    Placement Verified By:  Capnometry    Complicating Factors:  None    Findings Post-Intubation:  BS equal bilateral and atraumatic/condition of teeth unchanged

## 2023-08-04 NOTE — DISCHARGE SUMMARY
[facility]  Discharge Note  Short Stay    Procedure(s) (LRB):  ARTHROSCOPY, SHOULDER (Left)      OUTCOME: Condition has improved and patient is now ready for discharge.    DISPOSITION: Home or Self Care    FINAL DIAGNOSIS:  <principal problem not specified>    FOLLOWUP: In clinic    DISCHARGE INSTRUCTIONS:  No discharge procedures on file.     TIME SPENT ON DISCHARGE: 25 minutes

## 2023-08-04 NOTE — OP NOTE
Operative note     Date: 8/4/2023     Preop diagnosis:  Rotator cuff tear left shoulder    Postop diagnosis:  Left shoulder supraspinatus rotator cuff tear, degenerative labral tear    Procedure:  Arthroscopy, labral debridement, long head of the biceps release, mini deltoid splitting repair of the supraspinatus.  One primary double anchor with a secondary double row anchor    Surgeon: Albert Sood M.D.    Assistant: .    Anesthesia:  General    Complications: none    Blood loss: nil    Procedure in detail:  Informed consent was obtained.  Risks of the procedure was explained not excluding infection, bleeding, pain, scarring, neurovascular injury.     Given general anesthesia.  She was rolled in the lateral decubitus position left shoulder was prepped and draped sterilely.  Inflow was established the shoulder was examined arthroscopically.  She had a full-thickness rotator cuff tear of the supraspinatus.  She would a degenerative labral tear.  She had a frayed long head of the biceps which was released.  Following this I went on the top side and did a bursectomy.  I marked the tear with a Prolene.  Next a mini deltoid split was made and modified [employer] is were used for exposure and retraction.  Scarify the tuberosity with a curette and put in a Linvatec suture anchor.  I put in 2 horizontal mattress repairs and then put in a secondary a double row anchor with a joann-cross pattern.  Cuff quality was fair.  No complications.  The wound was irrigated the fascia was closed with 0 Vicryl subQ 2-0 staples sterile dressing applied.  No complications.    Albert Sood M.D.

## 2024-03-26 ENCOUNTER — HOSPITAL ENCOUNTER (OUTPATIENT)
Dept: RADIOLOGY | Facility: HOSPITAL | Age: 79
Discharge: HOME | End: 2024-03-26
Attending: NURSE PRACTITIONER
Payer: MEDICARE

## 2024-03-26 DIAGNOSIS — Z12.31 SCREENING MAMMOGRAM FOR HIGH-RISK PATIENT: ICD-10-CM

## 2024-03-26 PROCEDURE — 77067 SCR MAMMO BI INCL CAD: CPT | Mod: TC

## 2024-05-02 ENCOUNTER — ANESTHESIA EVENT (OUTPATIENT)
Dept: EMERGENCY MEDICINE | Facility: HOSPITAL | Age: 79
DRG: 208 | End: 2024-05-02
Payer: MEDICARE

## 2024-05-02 ENCOUNTER — HOSPITAL ENCOUNTER (INPATIENT)
Facility: HOSPITAL | Age: 79
LOS: 5 days | Discharge: NURSING, REHAB OR LONG-TERM CARE | DRG: 208 | End: 2024-05-07
Attending: FAMILY MEDICINE | Admitting: FAMILY MEDICINE
Payer: MEDICARE

## 2024-05-02 ENCOUNTER — ANESTHESIA (OUTPATIENT)
Dept: EMERGENCY MEDICINE | Facility: HOSPITAL | Age: 79
DRG: 208 | End: 2024-05-02
Payer: MEDICARE

## 2024-05-02 DIAGNOSIS — I16.1 HYPERTENSIVE EMERGENCY: ICD-10-CM

## 2024-05-02 DIAGNOSIS — I50.9 CHF (CONGESTIVE HEART FAILURE): ICD-10-CM

## 2024-05-02 DIAGNOSIS — J96.01 ACUTE RESPIRATORY FAILURE WITH HYPOXIA: Primary | ICD-10-CM

## 2024-05-02 DIAGNOSIS — R06.00 DYSPNEA: ICD-10-CM

## 2024-05-02 DIAGNOSIS — R05.9 COUGH IN ADULT: ICD-10-CM

## 2024-05-02 DIAGNOSIS — I50.9 CHF (CONGESTIVE HEART FAILURE), NYHA CLASS IV: ICD-10-CM

## 2024-05-02 DIAGNOSIS — J81.0 ACUTE PULMONARY EDEMA: ICD-10-CM

## 2024-05-02 DIAGNOSIS — J81.1 PULMONARY EDEMA: ICD-10-CM

## 2024-05-02 DIAGNOSIS — R06.03 RESPIRATORY DISTRESS: ICD-10-CM

## 2024-05-02 DIAGNOSIS — Z01.818 ENCOUNTER FOR INTUBATION: ICD-10-CM

## 2024-05-02 LAB
ALBUMIN SERPL-MCNC: 3.9 G/DL (ref 3.4–5)
ALBUMIN/GLOB SERPL: 1.2 RATIO
ALP SERPL-CCNC: 89 UNIT/L (ref 50–144)
ALT SERPL-CCNC: 23 UNIT/L (ref 1–45)
ANION GAP SERPL CALC-SCNC: 7 MEQ/L (ref 2–13)
APPEARANCE UR: CLEAR
AST SERPL-CCNC: 25 UNIT/L (ref 14–36)
AV INDEX (PROSTH): 0.94
AV MEAN GRADIENT: 7 MMHG
AV PEAK GRADIENT: 15 MMHG
AV REGURGITATION PRESSURE HALF TIME: 568 MS
AV VALVE AREA BY VELOCITY RATIO: 2.12 CM²
AV VALVE AREA: 2.38 CM²
AV VELOCITY RATIO: 0.83
BACTERIA #/AREA URNS AUTO: ABNORMAL /HPF
BASE EXCESS BLD CALC-SCNC: -6 MMOL/L (ref -2–2)
BASE EXCESS BLD CALC-SCNC: 4.2 MMOL/L (ref -2–2)
BASOPHILS # BLD AUTO: 0.03 X10(3)/MCL (ref 0.01–0.08)
BASOPHILS NFR BLD AUTO: 0.5 % (ref 0.1–1.2)
BILIRUB SERPL-MCNC: 0.7 MG/DL (ref 0–1)
BILIRUB UR QL STRIP.AUTO: NEGATIVE
BLOOD GAS SAMPLE TYPE (OHS): ABNORMAL
BLOOD GAS SAMPLE TYPE (OHS): ABNORMAL
BNP BLD-MCNC: 5930 PG/ML (ref 0–124.9)
BSA FOR ECHO PROCEDURE: 1.96 M2
BUN SERPL-MCNC: 13 MG/DL (ref 7–20)
CALCIUM SERPL-MCNC: 9.3 MG/DL (ref 8.4–10.2)
CHLORIDE SERPL-SCNC: 108 MMOL/L (ref 98–110)
CO2 SERPL-SCNC: 26 MMOL/L (ref 21–32)
COHGB MFR BLDA: 0.8 % (ref 0–1.5)
COHGB MFR BLDA: 1.2 % (ref 0–1.5)
COLOR UR AUTO: YELLOW
CREAT SERPL-MCNC: 0.89 MG/DL (ref 0.66–1.25)
CREAT/UREA NIT SERPL: 15 (ref 12–20)
D DIMER PPP IA.FEU-MCNC: 0.67 MG/L (ref 0.19–0.5)
DOP CALC AO PEAK VEL: 1.92 M/S
DOP CALC AO VTI: 29.3 CM
DOP CALC LVOT AREA: 2.5 CM2
DOP CALC LVOT DIAMETER: 1.8 CM
DOP CALC LVOT PEAK VEL: 1.6 M/S
DOP CALC LVOT STROKE VOLUME: 69.69 CM3
DOP CALC MV VTI: 21.9 CM
DOP CALCLVOT PEAK VEL VTI: 27.4 CM
E WAVE DECELERATION TIME: 197 MSEC
E/A RATIO: 2.5
E/E' RATIO: 14 M/S
EOSINOPHIL # BLD AUTO: 0.2 X10(3)/MCL (ref 0.04–0.36)
EOSINOPHIL NFR BLD AUTO: 3.1 % (ref 0.7–7)
ERYTHROCYTE [DISTWIDTH] IN BLOOD BY AUTOMATED COUNT: 14.6 % (ref 11–14.5)
GFR SERPLBLD CREATININE-BSD FMLA CKD-EPI: 66 MLS/MIN/1.73/M2
GLOBULIN SER-MCNC: 3.3 GM/DL (ref 2–3.9)
GLUCOSE SERPL-MCNC: 112 MG/DL (ref 70–115)
GLUCOSE UR QL STRIP.AUTO: NEGATIVE
HCO3 BLDA-SCNC: 19.5 MMOL/L (ref 22–26)
HCO3 BLDA-SCNC: 28.2 MMOL/L (ref 22–26)
HCT VFR BLD AUTO: 38.2 % (ref 36–48)
HGB BLD-MCNC: 12.9 G/DL (ref 11.8–16)
IMM GRANULOCYTES # BLD AUTO: 0.04 X10(3)/MCL (ref 0–0.03)
IMM GRANULOCYTES NFR BLD AUTO: 0.6 % (ref 0–0.5)
INHALED O2 CONCENTRATION: 100 %
INHALED O2 CONCENTRATION: 60 %
IP (OHS): 0 CMH2O
IP (OHS): 0 CMH2O
KETONES UR QL STRIP.AUTO: NEGATIVE
LEFT VENTRICLE DIASTOLIC VOLUME INDEX: 53.3 ML/M2
LEFT VENTRICLE DIASTOLIC VOLUME: 103.4 ML
LEFT VENTRICLE SYSTOLIC VOLUME INDEX: 34.6 ML/M2
LEFT VENTRICLE SYSTOLIC VOLUME: 67.2 ML
LEUKOCYTE ESTERASE UR QL STRIP.AUTO: ABNORMAL
LV LATERAL E/E' RATIO: 11.67 M/S
LV SEPTAL E/E' RATIO: 17.5 M/S
LVOT MG: 5 MMHG
LVOT MV: 0.99 CM/S
LYMPHOCYTES # BLD AUTO: 1.76 X10(3)/MCL (ref 1.16–3.74)
LYMPHOCYTES NFR BLD AUTO: 27.4 % (ref 20–55)
MAGNESIUM SERPL-MCNC: 2.1 MG/DL (ref 1.8–2.4)
MCH RBC QN AUTO: 29.1 PG (ref 27–34)
MCHC RBC AUTO-ENTMCNC: 33.8 G/DL (ref 31–37)
MCV RBC AUTO: 86 FL (ref 79–99)
MECH RR (OHS): 18 B/MIN
MECH RR (OHS): 24 B/MIN
METHGB MFR BLDA: 0.2 % (ref 0–1.5)
METHGB MFR BLDA: 0.5 % (ref 0–1.5)
MODE (OHS): AC
MODE (OHS): AC
MONOCYTES # BLD AUTO: 0.46 X10(3)/MCL (ref 0.24–0.36)
MONOCYTES NFR BLD AUTO: 7.2 % (ref 4.7–12.5)
MUCOUS THREADS URNS QL MICRO: ABNORMAL /LPF
MV MEAN GRADIENT: 1 MMHG
MV PEAK A VEL: 0.28 M/S
MV PEAK E VEL: 0.7 M/S
MV PEAK GRADIENT: 2 MMHG
MV VALVE AREA BY CONTINUITY EQUATION: 3.18 CM2
NEUTROPHILS # BLD AUTO: 3.94 X10(3)/MCL (ref 1.56–6.13)
NEUTROPHILS NFR BLD AUTO: 61.2 % (ref 37–73)
NITRITE UR QL STRIP.AUTO: NEGATIVE
NRBC BLD AUTO-RTO: 0 %
OHS LV EJECTION FRACTION SIMPSONS BIPLANE MOD: 35 %
PAW @ PEAK INSP FLOW SETTING VENT: 0 CMH20
PAW @ PEAK INSP FLOW SETTING VENT: 0 CMH20
PCO2 BLDA: 33 MMHG (ref 35–45)
PCO2 BLDA: 54.1 MMHG (ref 35–45)
PEEP RESPIRATORY: 6 CMH2O
PEEP RESPIRATORY: 6 CMH2O
PH BLDA: 7.22 [PH] (ref 7.35–7.45)
PH BLDA: 7.52 [PH] (ref 7.35–7.45)
PH UR STRIP.AUTO: 7 [PH]
PISA AR MAX VEL: 4.65 M/S
PISA TR MAX VEL: 2.45 M/S
PLATELET # BLD AUTO: 187 X10(3)/MCL (ref 140–371)
PMV BLD AUTO: 10.3 FL (ref 9.4–12.4)
PO2 BLDA: 171 MMHG (ref 80–105)
PO2 BLDA: 96.1 MMHG (ref 80–105)
POTASSIUM SERPL-SCNC: 3.3 MMOL/L (ref 3.5–5.1)
PROT SERPL-MCNC: 7.2 GM/DL (ref 6.3–8.2)
PROT UR QL STRIP.AUTO: NEGATIVE
RA PRESSURE ESTIMATED: 3 MMHG
RBC # BLD AUTO: 4.44 X10(6)/MCL (ref 4–5.1)
RBC #/AREA URNS AUTO: ABNORMAL /HPF
RBC UR QL AUTO: NEGATIVE
RV TB RVSP: 5 MMHG
SAO2 % BLDA: 100 % (ref 95–100)
SAO2 % BLDA: 96.6 % (ref 95–100)
SODIUM SERPL-SCNC: 141 MMOL/L (ref 135–145)
SP GR UR STRIP.AUTO: 1.02 (ref 1–1.03)
SPONT+MECH VT ON VENT: 400 ML
SPONT+MECH VT ON VENT: 400 ML
SQUAMOUS #/AREA URNS AUTO: ABNORMAL /HPF
TDI LATERAL: 0.06 M/S
TDI SEPTAL: 0.04 M/S
TDI: 0.05 M/S
TR MAX PG: 24 MMHG
TRICUSPID ANNULAR PLANE SYSTOLIC EXCURSION: 1.49 CM
TROPONIN I SERPL-MCNC: 0.03 NG/ML (ref 0–0.03)
TSH SERPL-ACNC: 1.56 UIU/ML (ref 0.36–3.74)
TV REST PULMONARY ARTERY PRESSURE: 27 MMHG
UROBILINOGEN UR STRIP-ACNC: 0.2
WBC # SPEC AUTO: 6.43 X10(3)/MCL (ref 4–11.5)
WBC #/AREA URNS AUTO: ABNORMAL /HPF

## 2024-05-02 PROCEDURE — 27100171 HC OXYGEN HIGH FLOW UP TO 24 HOURS

## 2024-05-02 PROCEDURE — 63600175 PHARM REV CODE 636 W HCPCS: Performed by: FAMILY MEDICINE

## 2024-05-02 PROCEDURE — 81001 URINALYSIS AUTO W/SCOPE: CPT | Performed by: FAMILY MEDICINE

## 2024-05-02 PROCEDURE — 25000003 PHARM REV CODE 250: Performed by: FAMILY MEDICINE

## 2024-05-02 PROCEDURE — 83880 ASSAY OF NATRIURETIC PEPTIDE: CPT | Performed by: FAMILY MEDICINE

## 2024-05-02 PROCEDURE — 0BH18EZ INSERTION OF ENDOTRACHEAL AIRWAY INTO TRACHEA, VIA NATURAL OR ARTIFICIAL OPENING ENDOSCOPIC: ICD-10-PCS | Performed by: FAMILY MEDICINE

## 2024-05-02 PROCEDURE — 87070 CULTURE OTHR SPECIMN AEROBIC: CPT | Performed by: FAMILY MEDICINE

## 2024-05-02 PROCEDURE — 94002 VENT MGMT INPAT INIT DAY: CPT

## 2024-05-02 PROCEDURE — 63600175 PHARM REV CODE 636 W HCPCS

## 2024-05-02 PROCEDURE — 85379 FIBRIN DEGRADATION QUANT: CPT | Performed by: FAMILY MEDICINE

## 2024-05-02 PROCEDURE — 84484 ASSAY OF TROPONIN QUANT: CPT | Performed by: FAMILY MEDICINE

## 2024-05-02 PROCEDURE — 94761 N-INVAS EAR/PLS OXIMETRY MLT: CPT

## 2024-05-02 PROCEDURE — 25000003 PHARM REV CODE 250

## 2024-05-02 PROCEDURE — 93005 ELECTROCARDIOGRAM TRACING: CPT

## 2024-05-02 PROCEDURE — 51702 INSERT TEMP BLADDER CATH: CPT

## 2024-05-02 PROCEDURE — 25500020 PHARM REV CODE 255: Performed by: FAMILY MEDICINE

## 2024-05-02 PROCEDURE — 85025 COMPLETE CBC W/AUTO DIFF WBC: CPT | Performed by: FAMILY MEDICINE

## 2024-05-02 PROCEDURE — 84443 ASSAY THYROID STIM HORMONE: CPT | Performed by: FAMILY MEDICINE

## 2024-05-02 PROCEDURE — 63600175 PHARM REV CODE 636 W HCPCS: Performed by: INTERNAL MEDICINE

## 2024-05-02 PROCEDURE — 36600 WITHDRAWAL OF ARTERIAL BLOOD: CPT

## 2024-05-02 PROCEDURE — 5A1945Z RESPIRATORY VENTILATION, 24-96 CONSECUTIVE HOURS: ICD-10-PCS | Performed by: FAMILY MEDICINE

## 2024-05-02 PROCEDURE — 82803 BLOOD GASES ANY COMBINATION: CPT

## 2024-05-02 PROCEDURE — 96361 HYDRATE IV INFUSION ADD-ON: CPT

## 2024-05-02 PROCEDURE — 99900026 HC AIRWAY MAINTENANCE (STAT)

## 2024-05-02 PROCEDURE — 99291 CRITICAL CARE FIRST HOUR: CPT

## 2024-05-02 PROCEDURE — C9113 INJ PANTOPRAZOLE SODIUM, VIA: HCPCS | Performed by: FAMILY MEDICINE

## 2024-05-02 PROCEDURE — 80053 COMPREHEN METABOLIC PANEL: CPT | Performed by: FAMILY MEDICINE

## 2024-05-02 PROCEDURE — 83735 ASSAY OF MAGNESIUM: CPT | Performed by: FAMILY MEDICINE

## 2024-05-02 PROCEDURE — 20000000 HC ICU ROOM

## 2024-05-02 PROCEDURE — 99900035 HC TECH TIME PER 15 MIN (STAT)

## 2024-05-02 PROCEDURE — 96374 THER/PROPH/DIAG INJ IV PUSH: CPT

## 2024-05-02 PROCEDURE — 31500 INSERT EMERGENCY AIRWAY: CPT | Performed by: NURSE ANESTHETIST, CERTIFIED REGISTERED

## 2024-05-02 PROCEDURE — 93010 ELECTROCARDIOGRAM REPORT: CPT | Mod: 76,,, | Performed by: INTERNAL MEDICINE

## 2024-05-02 RX ORDER — METOPROLOL TARTRATE 1 MG/ML
5 INJECTION, SOLUTION INTRAVENOUS
Status: DISPENSED | OUTPATIENT
Start: 2024-05-02 | End: 2024-05-02

## 2024-05-02 RX ORDER — FUROSEMIDE 10 MG/ML
80 INJECTION INTRAMUSCULAR; INTRAVENOUS
Status: COMPLETED | OUTPATIENT
Start: 2024-05-02 | End: 2024-05-02

## 2024-05-02 RX ORDER — SUCCINYLCHOLINE CHLORIDE 20 MG/ML
INJECTION INTRAMUSCULAR; INTRAVENOUS
Status: DISCONTINUED
Start: 2024-05-02 | End: 2024-05-02 | Stop reason: WASHOUT

## 2024-05-02 RX ORDER — METOPROLOL TARTRATE 1 MG/ML
5 INJECTION, SOLUTION INTRAVENOUS
Status: DISCONTINUED | OUTPATIENT
Start: 2024-05-02 | End: 2024-05-07 | Stop reason: HOSPADM

## 2024-05-02 RX ORDER — ETOMIDATE 2 MG/ML
INJECTION INTRAVENOUS
Status: DISPENSED
Start: 2024-05-02 | End: 2024-05-02

## 2024-05-02 RX ORDER — PANTOPRAZOLE SODIUM 40 MG/10ML
40 INJECTION, POWDER, LYOPHILIZED, FOR SOLUTION INTRAVENOUS DAILY
Status: DISCONTINUED | OUTPATIENT
Start: 2024-05-02 | End: 2024-05-06

## 2024-05-02 RX ORDER — ALPRAZOLAM 0.5 MG/1
0.5 TABLET ORAL
Status: COMPLETED | OUTPATIENT
Start: 2024-05-02 | End: 2024-05-02

## 2024-05-02 RX ORDER — ONDANSETRON HYDROCHLORIDE 2 MG/ML
4 INJECTION, SOLUTION INTRAVENOUS EVERY 6 HOURS PRN
Status: DISCONTINUED | OUTPATIENT
Start: 2024-05-02 | End: 2024-05-07 | Stop reason: HOSPADM

## 2024-05-02 RX ORDER — ROCURONIUM BROMIDE 10 MG/ML
INJECTION, SOLUTION INTRAVENOUS
Status: COMPLETED
Start: 2024-05-02 | End: 2024-05-02

## 2024-05-02 RX ORDER — PROPOFOL 10 MG/ML
0-50 INJECTION, EMULSION INTRAVENOUS CONTINUOUS
Status: DISCONTINUED | OUTPATIENT
Start: 2024-05-02 | End: 2024-05-04

## 2024-05-02 RX ORDER — POTASSIUM CHLORIDE 7.45 MG/ML
10 INJECTION INTRAVENOUS
Status: COMPLETED | OUTPATIENT
Start: 2024-05-02 | End: 2024-05-02

## 2024-05-02 RX ORDER — ENOXAPARIN SODIUM 100 MG/ML
1 INJECTION SUBCUTANEOUS EVERY 12 HOURS
Status: DISCONTINUED | OUTPATIENT
Start: 2024-05-02 | End: 2024-05-03

## 2024-05-02 RX ORDER — MUPIROCIN 20 MG/G
OINTMENT TOPICAL 2 TIMES DAILY
Status: DISPENSED | OUTPATIENT
Start: 2024-05-02 | End: 2024-05-07

## 2024-05-02 RX ORDER — SODIUM CHLORIDE 0.9 % (FLUSH) 0.9 %
3 SYRINGE (ML) INJECTION EVERY 6 HOURS PRN
Status: DISCONTINUED | OUTPATIENT
Start: 2024-05-02 | End: 2024-05-07 | Stop reason: HOSPADM

## 2024-05-02 RX ORDER — PROPOFOL 10 MG/ML
VIAL (ML) INTRAVENOUS
Status: COMPLETED
Start: 2024-05-02 | End: 2024-05-02

## 2024-05-02 RX ORDER — METOPROLOL TARTRATE 1 MG/ML
5 INJECTION, SOLUTION INTRAVENOUS EVERY 5 MIN PRN
Status: DISCONTINUED | OUTPATIENT
Start: 2024-05-02 | End: 2024-05-02

## 2024-05-02 RX ORDER — FUROSEMIDE 10 MG/ML
60 INJECTION INTRAMUSCULAR; INTRAVENOUS EVERY 12 HOURS
Status: DISCONTINUED | OUTPATIENT
Start: 2024-05-02 | End: 2024-05-04

## 2024-05-02 RX ORDER — CHLORHEXIDINE GLUCONATE ORAL RINSE 1.2 MG/ML
15 SOLUTION DENTAL 2 TIMES DAILY
Status: DISCONTINUED | OUTPATIENT
Start: 2024-05-02 | End: 2024-05-04

## 2024-05-02 RX ADMIN — POTASSIUM CHLORIDE 10 MEQ: 7.46 INJECTION, SOLUTION INTRAVENOUS at 10:05

## 2024-05-02 RX ADMIN — ALPRAZOLAM 0.5 MG: 0.5 TABLET ORAL at 08:05

## 2024-05-02 RX ADMIN — PROPOFOL 50 MCG/KG/MIN: 10 INJECTION, EMULSION INTRAVENOUS at 07:05

## 2024-05-02 RX ADMIN — FUROSEMIDE 60 MG: 10 INJECTION, SOLUTION INTRAMUSCULAR; INTRAVENOUS at 09:05

## 2024-05-02 RX ADMIN — MUPIROCIN: 20 OINTMENT TOPICAL at 09:05

## 2024-05-02 RX ADMIN — CHLORHEXIDINE GLUCONATE 15 ML: 1.2 RINSE ORAL at 09:05

## 2024-05-02 RX ADMIN — PROPOFOL 30 MCG/KG/MIN: 10 INJECTION, EMULSION INTRAVENOUS at 03:05

## 2024-05-02 RX ADMIN — FUROSEMIDE 80 MG: 10 INJECTION, SOLUTION INTRAMUSCULAR; INTRAVENOUS at 08:05

## 2024-05-02 RX ADMIN — CHLORHEXIDINE GLUCONATE 15 ML: 1.2 RINSE ORAL at 12:05

## 2024-05-02 RX ADMIN — POTASSIUM CHLORIDE 10 MEQ: 7.46 INJECTION, SOLUTION INTRAVENOUS at 11:05

## 2024-05-02 RX ADMIN — PROPOFOL 120 MG: 10 INJECTION, EMULSION INTRAVENOUS at 08:05

## 2024-05-02 RX ADMIN — ENOXAPARIN SODIUM 80 MG: 80 INJECTION SUBCUTANEOUS at 12:05

## 2024-05-02 RX ADMIN — IOHEXOL 75 ML: 350 INJECTION, SOLUTION INTRAVENOUS at 02:05

## 2024-05-02 RX ADMIN — ENOXAPARIN SODIUM 80 MG: 80 INJECTION SUBCUTANEOUS at 09:05

## 2024-05-02 RX ADMIN — ROCURONIUM BROMIDE 50 MG: 10 INJECTION, SOLUTION INTRAVENOUS at 08:05

## 2024-05-02 RX ADMIN — FUROSEMIDE 60 MG: 10 INJECTION, SOLUTION INTRAMUSCULAR; INTRAVENOUS at 12:05

## 2024-05-02 RX ADMIN — SODIUM CHLORIDE 1000 ML: 9 INJECTION, SOLUTION INTRAVENOUS at 07:05

## 2024-05-02 RX ADMIN — PROPOFOL 50 MCG/KG/MIN: 10 INJECTION, EMULSION INTRAVENOUS at 11:05

## 2024-05-02 RX ADMIN — PROPOFOL 5 MCG/KG/MIN: 10 INJECTION, EMULSION INTRAVENOUS at 10:05

## 2024-05-02 RX ADMIN — NITROGLYCERIN 1 INCH: 20 OINTMENT TOPICAL at 08:05

## 2024-05-02 RX ADMIN — MUPIROCIN 1 G: 20 OINTMENT TOPICAL at 10:05

## 2024-05-02 RX ADMIN — PANTOPRAZOLE SODIUM 40 MG: 40 INJECTION, POWDER, LYOPHILIZED, FOR SOLUTION INTRAVENOUS at 09:05

## 2024-05-02 NOTE — PLAN OF CARE
05/02/24 1503   Discharge Assessment   Assessment Type Discharge Planning Assessment   Confirmed/corrected address, phone number and insurance Yes   Confirmed Demographics Correct on Facesheet   Source of Information family   When was your last doctors appointment? 04/29/24  (Dr. Kee 4/29, a month ago a check-up with Mery Sanon NP)   Communicated DOLLY with patient/caregiver Date not available/Unable to determine   Reason For Admission Acute Respiratory Distress, CHF, Pulmonary Edema, HTN Emergency   People in Home alone   Facility Arrived From: Home   Do you expect to return to your current living situation? No   Prior to hospitilization cognitive status: Alert/Oriented   Current cognitive status: Coma/Sedated/Intubated   Walking or Climbing Stairs Difficulty yes   Walking or Climbing Stairs ambulation difficulty, requires equipment   Mobility Management Rollator   Dressing/Bathing Difficulty no   Equipment Currently Used at Home bath bench;rollator   Readmission within 30 days? No   Do you currently have service(s) that help you manage your care at home? No   Do you take prescription medications? Yes   Do you have prescription coverage? Yes   Coverage [employer] M[employer]   Do you have any problems affording any of your prescribed medications? No   Is the patient taking medications as prescribed? yes   Who is going to help you get home at discharge? Children   How do you get to doctors appointments? family or friend will provide   Are you on dialysis? No   Do you take coumadin? No   Discharge Plan A Skilled Nursing Facility   Discharge Plan B Home Health   DME Needed Upon Discharge  none   Discharge Plan discussed with: Adult children   Transition of Care Barriers None   Physical Activity   On average, how many days per week do you engage in moderate to strenuous exercise (like a brisk walk)? 1 day   On average, how many minutes do you engage in exercise at this level? 10 min   Financial Resource Strain   How hard is  it for you to pay for the very basics like food, housing, medical care, and heating? Not hard   Housing Stability   In the last 12 months, was there a time when you were not able to pay the mortgage or rent on time? N   At any time in the past 12 months, were you homeless or living in a shelter (including now)? N   Transportation Needs   In the past 12 months, has lack of transportation kept you from medical appointments or from getting medications? no   In the past 12 months, has lack of transportation kept you from meetings, work, or from getting things needed for daily living? No   Food Insecurity   Within the past 12 months, you worried that your food would run out before you got the money to buy more. Never true   Within the past 12 months, the food you bought just didn't last and you didn't have money to get more. Never true   Stress   Do you feel stress - tense, restless, nervous, or anxious, or unable to sleep at night because your mind is troubled all the time - these days? To some exte   Alcohol Use   Q1: How often do you have a drink containing alcohol? Never   Q2: How many drinks containing alcohol do you have on a typical day when you are drinking? None   Q3: How often do you have six or more drinks on one occasion? Never

## 2024-05-02 NOTE — PLAN OF CARE
Problem: Adult Inpatient Plan of Care  Goal: Plan of Care Review  Flowsheets (Taken 5/2/2024 8137)  Plan of Care Reviewed With:   patient   family   child  Goal: Patient-Specific Goal (Individualized)  Reactivated  Goal: Absence of Hospital-Acquired Illness or Injury  Reactivated  Goal: Optimal Comfort and Wellbeing  Reactivated  Goal: Readiness for Transition of Care  Reactivated     Problem: Infection  Goal: Absence of Infection Signs and Symptoms  Reactivated     Problem: Fall Injury Risk  Goal: Absence of Fall and Fall-Related Injury  Reactivated     Problem: Restraint, Nonviolent  Goal: Absence of Harm or Injury  Reactivated     Problem: Skin Injury Risk Increased  Goal: Skin Health and Integrity  Reactivated

## 2024-05-02 NOTE — NURSING
1151-PATIENT'S BELONGINGS (KISHA) SENT HOME WITH DAUGHTER LANDON ARIAS.    3311-UPDATES PROVIDED TO DR. FRANK.

## 2024-05-02 NOTE — ED PROVIDER NOTES
Encounter Date: 5/2/2024       History     Chief Complaint   Patient presents with    Shortness of Breath     Pt reports onset of SOB this AM while ambulating to bathroom. Denies wearing any o2 at home. EMS reports SPO2 97% on room air but had tachypnea. Pt denies any recent cough or illness or pain. Does report cardiac hx. Denies taking AM meds.      Patient presents to the emergency room complaining of generalized weakness since yesterday.  No fevers chills sweats nausea or vomiting.  She feels like her heart is racing.  She has a history of similar in the past.  She reports no chest or abdominal pain.  I asked her 4 times and she repeatedly denies shortness breath.  Of past medical records reveals she has a history of atrial fibrillation.          Review of patient's allergies indicates:  No Known Allergies  Past Medical History:   Diagnosis Date    Fluid retention     Hypercholesterolemia     Hypertension     Pacemaker     Paroxysmal atrial fibrillation      Past Surgical History:   Procedure Laterality Date    HYSTERECTOMY      JOINT REPLACEMENT Right     X2    SHOULDER ARTHROSCOPY Left 8/4/2023    Procedure: ARTHROSCOPY, SHOULDER;  Surgeon: Albert Sood MD;  Location: [facility];  Service: Orthopedics;  Laterality: Left;     No family history on file.  Social History     Tobacco Use    Smoking status: Never    Smokeless tobacco: Never   Substance Use Topics    Alcohol use: Never    Drug use: Never     Review of Systems   Constitutional:  Positive for fatigue.   Respiratory: Negative.     Cardiovascular:  Positive for palpitations.   Gastrointestinal: Negative.    Neurological:  Positive for weakness.       Physical Exam     Initial Vitals [05/02/24 0734]   BP Pulse Resp Temp SpO2   (!) 148/116 (!) 114 20 98.2 °F (36.8 °C) 97 %      MAP       --         Physical Exam    Constitutional: She appears well-developed and well-nourished.   Eyes: EOM are normal. Pupils are equal, round, and reactive to light.    Cardiovascular:            Tachycardic without murmurs gallops or rubs   Pulmonary/Chest: Breath sounds normal.   Abdominal: Abdomen is soft. Bowel sounds are normal.   Musculoskeletal:         General: Normal range of motion.     Neurological: She is alert and oriented to person, place, and time.   Skin: Skin is warm and dry.         ED Course   Critical Care    Date/Time: 5/2/2024 7:30 AM    Performed by: Godwin Lawson MD  Authorized by: Godwin Lawson MD  Direct patient critical care time: 18 minutes  Ordering / reviewing critical care time: 14 minutes  Documentation critical care time: 12 minutes  Consulting other physicians critical care time: 6 minutes  Total critical care time (exclusive of procedural time) : 50 minutes  Critical care was necessary to treat or prevent imminent or life-threatening deterioration of the following conditions: respiratory failure.  Critical care was time spent personally by me on the following activities: development of treatment plan with patient or surrogate, discussions with primary provider, interpretation of cardiac output measurements, evaluation of patient's response to treatment, examination of patient, obtaining history from patient or surrogate, ordering and performing treatments and interventions, ordering and review of laboratory studies, ordering and review of radiographic studies, pulse oximetry, re-evaluation of patient's condition and review of old charts.        Labs Reviewed   COMPREHENSIVE METABOLIC PANEL - Abnormal; Notable for the following components:       Result Value    Potassium Level 3.3 (*)     All other components within normal limits   NT-PRO NATRIURETIC PEPTIDE - Abnormal; Notable for the following components:    ProBNP 5,930.0 (*)     All other components within normal limits   D DIMER, QUANTITATIVE - Abnormal; Notable for the following components:    D-Dimer 0.67 (*)     All other components within normal limits   CBC WITH DIFFERENTIAL  - Abnormal; Notable for the following components:    RDW 14.6 (*)     Mono # 0.46 (*)     IG# 0.04 (*)     IG% 0.6 (*)     All other components within normal limits   URINALYSIS - Abnormal; Notable for the following components:    Leukocyte Esterase, UA Trace (*)     All other components within normal limits    Narrative:      URINE STABILITY IS 2 HOURS AT ROOM TEMP OR    SIX HOURS REFRIGERATED. PERFORMING TESTING ON    SPECIMENS GREATER THAN THIS AGE MAY AFFECT THE    FOLLOWING TESTS:    PH          SPECIFIC GRAVITY           BLOOD    CLARITY     BILIRUBIN               UROBILINOGEN   URINALYSIS, MICROSCOPIC - Abnormal; Notable for the following components:    Mucous, UA Small (*)     All other components within normal limits   BLOOD GAS - Abnormal; Notable for the following components:    pH, Blood gas 7.223 (*)     pCO2, Blood gas 54.1 (*)     HCO3, Blood gas 19.5 (*)     Base Excess, Blood gas -6.00 (*)     All other components within normal limits   MAGNESIUM - Normal   TSH - Normal   TROPONIN I - Normal   RESPIRATORY CULTURE (OLG)   CBC W/ AUTO DIFFERENTIAL    Narrative:     The following orders were created for panel order CBC auto differential.  Procedure                               Abnormality         Status                     ---------                               -----------         ------                     CBC with Differential[[id number]]        Abnormal            Final result                 Please view results for these tests on the individual orders.     EKG Readings: (Independently Interpreted)   Rhythm: Accelerated Junctional Rhythm. Heart Rate: 113. Conduction: LBBB. T Waves: Normal. Axis: Left Axis Deviation.   Other EKG Interpretations: Reg rhythm , paced, rate 63       Imaging Results              X-Ray Chest AP Portable (Final result)  Result time 05/02/24 09:30:12      Final result by Rob Pang MD (05/02/24 09:30:12)                   Impression:      Lines and tubes as above with  developing pulmonary edema.      Electronically signed by: Rob Pang MD  Date:    05/02/2024  Time:    09:30               Narrative:    EXAMINATION:  XR CHEST AP PORTABLE    CLINICAL HISTORY:  Encounter for other preprocedural examination    TECHNIQUE:  Single frontal view of the chest was performed.    COMPARISON:  05/24/2024    FINDINGS:  Interval placement of enteric tube descends with the diaphragm with the distal tip in saddle distal to the GE junction.  Tube is identified at the level of the clavicular heads.  Left chest wall pacer is unchanged in position.    The heart size is enlarged with pulmonary vascular congestion.    Developing patchy airspace opacities especially in the upper lobe distribution.  No evidence for effusion                        Wet Read by Godwin Lawson MD (05/02/24 09:21:59, [facility], Emergency Medicine)    ETT  and g-tube placement confirmed                                     X-Ray Chest AP Portable (Final result)  Result time 05/02/24 08:21:14      Final result by Rob Pang MD (05/02/24 08:21:14)                   Impression:      Changes of interstitial edema.      Electronically signed by: Rob Pang MD  Date:    05/02/2024  Time:    08:21               Narrative:    EXAMINATION:  XR CHEST AP PORTABLE    CLINICAL HISTORY:  Cough, unspecified    TECHNIQUE:  Single frontal view of the chest was performed.    COMPARISON:  08/01/2023    FINDINGS:  Heart size enlarged with left chest wall pacer.  Pulmonary vasculature is congested.    Dependent atelectatic changes lungs with small bibasilar effusions.                                       Medications   metoprolol injection 5 mg (5 mg Intravenous Not Given 5/2/24 0830)   etomidate (AMIDATE) 2 mg/mL injection (  Not Given 5/2/24 0847)   propofol (DIPRIVAN) 10 mg/mL infusion (5 mcg/kg/min × 83.5 kg Intravenous New Bag 5/2/24 1023)   mupirocin 2 % ointment (has no administration in time range)    metoprolol injection 5 mg (has no administration in time range)   ALPRAZolam tablet 0.5 mg (0.5 mg Oral Given 5/2/24 0837)   furosemide injection 80 mg (80 mg Intravenous Given 5/2/24 0845)   nitroGLYCERIN 2% TD oint ointment 1 inch (1 inch Transdermal Given 5/2/24 0845)   propofol (DIPRIVAN) 10 mg/mL IVP injection (120 mg  Given 5/2/24 0847)   rocuronium 10 mg/mL injection (50 mg  Given 5/2/24 0847)     Medical Decision Making  In the emergency room the patient continued to deteriorate.  She developed wheezing and crackles.  She became increasingly this increasingly distressed.  The decision was made to intubate the patient.    Amount and/or Complexity of Data Reviewed  Labs: ordered.  Radiology: ordered and independent interpretation performed.  Discussion of management or test interpretation with external provider(s): Case discussed with hospitalist on-call, she will be admitted to the ICU for respiratory failure, malignant hypertension, acute CHF.  [facility]IS tele accordingly allergy consult performed with the recommendations noted for inpatient management    Risk  Prescription drug management.  Decision regarding hospitalization.                                      Clinical Impression:  Final diagnoses:  [R06.00] Dyspnea  [R05.9] Cough in adult  [Z01.818] Encounter for intubation  [J96.01] Acute respiratory failure with hypoxia (Primary)  [J81.0] Acute pulmonary edema  [I16.1] Hypertensive emergency  [R06.03] Respiratory distress          ED Disposition Condition    Admit Stable                Godwin Lawson MD  05/02/24 1460

## 2024-05-02 NOTE — ANESTHESIA PROCEDURE NOTES
Ad Hoc Intubation    Date/Time: 5/2/2024 8:47 AM    Performed by: Jose Us CRNA  Authorized by: Jose Us CRNA    Indications:  Respiratory failure  Diagnosis:  Code Blue  Patient Location:  ED  Timeout:  5/2/2024 8:46 AM  Procedure Start Time:  5/2/2024 8:47 AM  Procedure End Time:  5/2/2024 8:47 AM  Staff:     Anesthesiologist Present: Yes    Intubation:     Induction:  Rapid sequence induction    Intubated:  Arrived to OR intubated    Mask Ventilation:  Easy mask    Attempts:  1    Attempted By:  CRNA    Method of Intubation:  Direct    Blade:  Alex 3    Laryngeal View Grade: Grade I - full view of chords      Difficult Airway Encountered?: No      Complications:  None    Airway Device:  Oral endotracheal tube    Airway Device Size:  7.5    Style/Cuff Inflation:  Cuffed    Inflation Amount (mL):  5    Tube secured:  19    Secured at:  The lips    Placement Verified By:  Capnometry, Chest x-ray and Colorimetric ETCO2 device    Complicating Factors:  None

## 2024-05-02 NOTE — CONSULTS
[facility]    Patient Name: Patsy H Cantu  MRN: [id number]  Admission Date: 5/2/2024  Hospital Length of Stay: 0 days  Code Status: Prior   Attending Provider: Ernie Kim MD   Consulting Provider: Jose Luis Mercado NP  Primary Care Physician: Mery Sanon NP  Principal Problem:<principal problem not specified>    Patient information was obtained from past medical records, ER records, and primary team.     Consults  Subjective:     Chief Complaint:    Telecardiology Consult  Location: [facility] ER  Provider: Dr Lawson  Reason: Acute CHF  Duration: > 300 minutes including review of medical records and provider interview ( Patient is intubated )    HPI:   79-year-old female followed by Dr. Mckeon in [place] from a cardiology standpoint.  She has a history of paroxysmal atrial fibrillation in his rate controlled with diltiazem, sotalol, digoxin and anticoagulated with Xarelto.  She had a pacemaker placed about 10 years ago according to the family.  As far as the family knows she has no history of coronary artery disease and has not had any previous stenting.  She does have a history of congestive heart failure in his on Lasix 40 mg daily at home.  According to the family she did well this weekend she went to a wedding she was dancing and walking around without any issues.  Today she started to develop some shortness of breath at home that progressively worsened.  She presented to the ER for evaluation.  Initial EKG done upon arrival showed a tachycardia with some ST segment abnormalities.  Chest x-ray was positive for congestive heart failure.  Blood pressure was 227/121.  She had a decline in status in the ER and required intubation.  Once she was intubated given IV Lasix her blood pressure improved and a repeat EKG shows an atrial paced rhythm. Currently sedated with propofol.    Initial set of cardiac enzymes are negative.    Currently appears in no  acute distress and vital signs are stable.    Current history and past medical history was discussed with her daughters who were at her bedside    Past Medical History:   Diagnosis Date    Fluid retention     Hypercholesterolemia     Hypertension     Pacemaker     Paroxysmal atrial fibrillation        Past Surgical History:   Procedure Laterality Date    HYSTERECTOMY      JOINT REPLACEMENT Right     X2    SHOULDER ARTHROSCOPY Left 8/4/2023    Procedure: ARTHROSCOPY, SHOULDER;  Surgeon: Albert Sood MD;  Location: [facility];  Service: Orthopedics;  Laterality: Left;       Review of patient's allergies indicates:  No Known Allergies    Current Facility-Administered Medications   Medication Dose Route Frequency Provider Last Rate Last Admin    etomidate (AMIDATE) 2 mg/mL injection             metoprolol injection 5 mg  5 mg Intravenous ED 1 Time Godwin Lawson MD         Current Outpatient Medications   Medication Sig Dispense Refill    benazepriL (LOTENSIN) 20 MG tablet Take 20 mg by mouth 2 (two) times daily.      digoxin (LANOXIN) 125 mcg tablet Take 125 mcg by mouth once daily.      diltiaZEM (CARDIZEM) 120 MG tablet Take 120 mg by mouth every 12 (twelve) hours.      EScitalopram oxalate (LEXAPRO) 10 MG tablet       furosemide (LASIX) 40 MG tablet Take 40 mg by mouth once daily.      hydrALAZINE (APRESOLINE) 50 MG tablet Take 50 mg by mouth 2 (two) times a day.      hydrOXYzine pamoate (VISTARIL) 25 MG Cap Take 50 mg by mouth 2 (two) times daily.      potassium chloride (KLOR-CON) 10 MEQ TbSR 1 tablet once daily.      sotaloL (BETAPACE) 240 MG Tab Take 240 mg by mouth 2 (two) times daily.      VASCEPA 1 gram Cap Take 1 capsule by mouth 2 (two) times a day.      XARELTO 20 mg Tab Take 20 mg by mouth nightly.      acetaminophen (TYLENOL) 500 MG tablet Take 1,000 mg by mouth nightly.      HYDROcodone-acetaminophen (NORCO) 7.5-325 mg per tablet Take 1 tablet by mouth every 6 (six) hours as needed for Pain.  28 tablet 0     Family History    None       Tobacco Use    Smoking status: Never    Smokeless tobacco: Never   Substance and Sexual Activity    Alcohol use: Never    Drug use: Never    Sexual activity: Not Currently     Review of Systems   Reason unable to perform ROS: Intubated - ROS from family.   Constitutional: Positive for malaise/fatigue.   Cardiovascular:  Negative for chest pain.   Respiratory:  Positive for cough and shortness of breath.      Objective:     Vital Signs (Most Recent):  Temp: 98.2 °F (36.8 °C) (05/02/24 0939)  Pulse: 61 (05/02/24 0930)  Resp: 18 (05/02/24 0930)  BP: (!) 177/107 (05/02/24 0930)  SpO2: (!) 94 % (05/02/24 0930) Vital Signs (24h Range):  Temp:  [98.2 °F (36.8 °C)] 98.2 °F (36.8 °C)  Pulse:  [] 61  Resp:  [15-30] 18  SpO2:  [73 %-98 %] 94 %  BP: (122-227)/() 177/107     Weight: 83.5 kg (184 lb)  Body mass index is 30.62 kg/m².    SpO2: (!) 94 %       No intake or output data in the 24 hours ending 05/02/24 1013    Lines/Drains/Airways       Drain  Duration                  Urethral Catheter 05/02/24 0851 Double-lumen <1 day              Airway  Duration                  Airway - Non-Surgical 05/02/24 0847 Endotracheal Tube <1 day              Peripheral Intravenous Line  Duration                  Peripheral IV - Single Lumen 05/02/24 0739 18 G Left Antecubital <1 day                    Physical Exam  Constitutional:       Appearance: Normal appearance. She is obese.   HENT:      Head: Normocephalic.      Mouth/Throat:      Mouth: Mucous membranes are moist.   Cardiovascular:      Rate and Rhythm: Normal rate and regular rhythm.   Pulmonary:      Comments: Diminished BS bilat  Abdominal:      General: Bowel sounds are normal.   Skin:     General: Skin is warm and dry.   Neurological:      Comments: Unable to evaluate   Psychiatric:      Comments: Unable to evaluate         Significant Labs:   Recent Lab Results         05/02/24 0924 05/02/24  0822   05/02/24  0746         Albumin/Globulin Ratio     1.2       Albumin     3.9       ALP     89       ALT     23       Anion Gap     7.0       Appearance, UA   Clear         AST     25       Bacteria, UA   None Seen         Baso #     0.03       Basophil %     0.5       BILIRUBIN TOTAL     0.7       Bilirubin, UA   Negative         BUN     13.0       BUN/CREAT RATIO     15       Calcium     9.3       Chloride     108       CO2     26       Color, UA   Yellow         Creatinine     0.89       D-Dimer     0.67       eGFR     66  Comment:                      EGFR INTERPRETATION    Beginning 8/15/22 we are reporting the eGFRcr calculation as recommended by the [employer]. The eGFRcr equation has similar overall performance characteristics to the older equation, but the values may differ by more than 10% particularly at higher values of eGFRcr and younger adult ages.    NKF stages of chronic kidney disease (CKD)  Stage 1: Kidney damage with normal or increased eGFR (>90 mL/min/1.73 m^2)  Stage 2: Mild reduction in GFR (60-89 mL/min/1.73 m^2)  Stage 3a: Moderate reduction in GFR (45-59 mL/min/1.73 m^2)  Stage 3b: Moderate reduction in GFR (30-44 mL/min/1.73 m^2)  Stage 4: Severe reduction in GFR (15-29 mL/min/1.73 m^2)  Stage 5: Kidney failure (GFR <15 mL/min/1.73 m^2)           Eos #     0.20       Eos %     3.1       FIO2, Blood gas 100.0           Globulin, Total     3.3       Glucose     112       Glucose, UA   Negative         Hematocrit     38.2       Hemoglobin     12.9       Immature Grans (Abs)     0.04       Immature Granulocytes     0.6       IP 0.0           Ketones, UA   Negative         Leukocyte Esterase, UA   Trace         Lymph #     1.76       LYMPH %     27.4       Magnesium      2.10       MCH     29.1       MCHC     33.8       MCV     86.0       Mech Vt 400           MODE AC           Mono #     0.46       Mono %     7.2       MPV     10.3       Mucous, UA   Small         Neut #     3.94       Neut %      "61.2       NITRITE UA   Negative         nRBC     0.0       Blood, UA   Negative         PEEP 6.0           pH, UA   7.0         PIP 0           Platelet Count     187       Base Excess, Blood gas -6.00           CO Hgb 1.2           POC HCO3 19.5           Met Hgb 0.2           POC PCO2 54.1           POC PH 7.223           POC PO2 96.1           Potassium     3.3       ProBNP     5,930.0  Comment:   For ambulatory patients presenting to outpatient facilities with clinical suspicion of HF not previously diagnosed and at least one sign, symptom or risk factor for HF, NT-proBNP II test results should be interpreted as indicated below:    <125(pg/mL):"Negative: Heart Failure Unlikely"    >=125(pg/mL):"Consider Heart Failure as well as other causes of NT-proBNP elevation"             PROTEIN TOTAL     7.2       Protein, UA   Negative         RBC     4.44       RBC, UA   3-5         RDW     14.6       Mech RR 18           Sample Type Arterial Blood           sO2, Blood gas 96.6           Sodium     141       Specific Gravity,UA   1.020         Squam Epithel, UA   Rare         Troponin I     0.029       TSH     1.560       Urobilinogen, UA   0.2         WBC, UA   3-5         WBC     6.43               Significant Imaging:   Initial EKG:      Repeat EKG      Imaging:    Assessment and Plan:     Impression:  Acute onset SOB requiring intubation  - CHF ( on lasix at home ) unknown EF  Abnormal EKG  PAF  - on Sotolol, Dig, Dilt and xarelto  - PPM  HTN    Plan:   Agree with transfer to the ICU   Continue IV Lasix  Replace potassium and continue to follow electrolytes  Cycle cardiac biomarkers  May use IV metoprolol for now while she is intubated.  If she has RVR could consider amiodarone  Lovenox 1 mg/kg sq bid while off Xarelto   Echocardiogram to evaluate LV function  We will continue to follow while in the hospital - I will inform the [facility] office  Please attempt to get records from Dr. Mckeon in [place] " Mandeep    Thank you again for the consultation should you have any further questions or concerns please do not hesitate to call    There are no hospital problems to display for this patient.      VTE Risk Mitigation (From admission, onward)      None            Thank you for your consult.     Jose Luis Mercado NP  Cardiology   [facility]

## 2024-05-02 NOTE — H&P
Hospital Medicine  History & Physical Examination       Patient: Patsy H Cantu  MRN: [id number]  STATUS: IP- Inpatient   DOS: 5/2/2024   PCP: Mery Sanon NP      CC: weakness, sob       HISTORY OF PRESENT ILLNESS   79 y.o. female with a history that includes pacemaker, PAF on diltiazem, sotalol, and digoxin and HTN presented to ED with c/o of worsening sob and weakness since yesterday. In ER on arrival O2 sats 97% but respiratory status continued to deteriorate while in the ER. Decision was made to intubate patient. BNP > 5,000. CXR show bilateral pulmonary edema/significant vascular congestion. Initial BP was 227/121 mmHg. She was given 80 mg iv lasix. Started on nitropaste. Cardiology consulted recommend cont iv lasix, obtain echocardiogram, use prn iv lopressor 5 mg for atrial fib with rvr.      REVIEW OF SYSTEMS     Except as documented, all other systems reviewed and negative       PAST MEDICAL HISTORY     Past Medical History:   Diagnosis Date    Fluid retention     Hypercholesterolemia     Hypertension     Pacemaker     Paroxysmal atrial fibrillation           PAST SURGICAL HISTORY     Past Surgical History:   Procedure Laterality Date    HYSTERECTOMY      JOINT REPLACEMENT Right     X2    SHOULDER ARTHROSCOPY Left 8/4/2023    Procedure: ARTHROSCOPY, SHOULDER;  Surgeon: Albert Sood MD;  Location: [facility] OR;  Service: Orthopedics;  Laterality: Left;          FAMILY HISTORY     Reviewed, negative in relation to patient's current condition.      SOCIAL HISTORY     Denies alcohol, tobacco or drug abuse    Social History     Tobacco Use    Smoking status: Never    Smokeless tobacco: Never   Substance Use Topics    Alcohol use: Never          ALLERGIES     Patient has no known allergies.      HOME MEDICATIONS     Current Outpatient Medications   Medication Instructions    acetaminophen (TYLENOL) 1,000 mg, Oral, Nightly    benazepriL (LOTENSIN) 20 mg, Oral, 2 times daily    digoxin (LANOXIN) 125 mcg,  "Oral, Daily    diltiaZEM (CARDIZEM) 120 mg, Oral, Every 12 hours    EScitalopram oxalate (LEXAPRO) 10 MG tablet No dose, route, or frequency recorded.    furosemide (LASIX) 40 mg, Oral, Daily    hydrALAZINE (APRESOLINE) 50 mg, Oral, 2 times daily    HYDROcodone-acetaminophen (NORCO) 7.5-325 mg per tablet 1 tablet, Oral, Every 6 hours PRN    hydrOXYzine pamoate (VISTARIL) 50 mg, Oral, 2 times daily    potassium chloride (KLOR-CON) 10 MEQ TbSR 1 tablet, Daily    sotaloL (BETAPACE) 240 mg, Oral, 2 times daily    VASCEPA 1 gram Cap 1 capsule, Oral, 2 times daily    XARELTO 20 mg, Oral, Nightly          PHYSICAL EXAM   VITALS: T 97.7 °F (36.5 °C)   BP (!) 142/90   P 60   RR (!) 24   O2 100 %    GENERAL: sedated on ventilator   HEENT: NC/AT, EOMI, PERRL.  NECK: Supple,  No JVD  LUNGS: CTA B/L  CVS: RRR, S1S2 positive  GI/: Soft, NT/ND, bowel sounds positive.  EXTREMITIES: Peripheral pulses 2+, no peripheral edema  DERM: Warm, dry.  No rashes or lesions noted.  NEURO: sedated on ventilator       DIAGNOSTICS     Recent Labs     05/02/24  0746   WBC 6.43   RBC 4.44   HGB 12.9   HCT 38.2   MCV 86.0   MCH 29.1   MCHC 33.8   RDW 14.6*        No results for input(s): "LACTIC" in the last 72 hours.  No results for input(s): "INR", "APTT", "D-DIMER" in the last 72 hours.  No results for input(s): "HGBA1C", "CHOL", "TRIG", "LDL", "VLDL", "HDL" in the last 72 hours.   Recent Labs     05/02/24  0746      K 3.3*   CHLORIDE 108   CO2 26   BUN 13.0   CREATININE 0.89   GLUCOSE 112   CALCIUM 9.3   MG 2.10   ALBUMIN 3.9   GLOBULIN 3.3   ALKPHOS 89   ALT 23   AST 25   BILITOT 0.7   TSH 1.560     Recent Labs     05/02/24  0746   TROPONINI 0.029          X-Ray Chest AP Portable  Narrative: EXAMINATION:  XR CHEST AP PORTABLE    CLINICAL HISTORY:  Encounter for other preprocedural examination    TECHNIQUE:  Single frontal view of the chest was performed.    COMPARISON:  05/24/2024    FINDINGS:  Interval placement of enteric tube " descends with the diaphragm with the distal tip in saddle distal to the GE junction.  Tube is identified at the level of the clavicular heads.  Left chest wall pacer is unchanged in position.    The heart size is enlarged with pulmonary vascular congestion.    Developing patchy airspace opacities especially in the upper lobe distribution.  No evidence for effusion  Impression: Lines and tubes as above with developing pulmonary edema.    Electronically signed by: Rob Pang MD  Date:    05/02/2024  Time:    09:30  X-Ray Chest AP Portable  Narrative: EXAMINATION:  XR CHEST AP PORTABLE    CLINICAL HISTORY:  Cough, unspecified    TECHNIQUE:  Single frontal view of the chest was performed.    COMPARISON:  08/01/2023    FINDINGS:  Heart size enlarged with left chest wall pacer.  Pulmonary vasculature is congested.    Dependent atelectatic changes lungs with small bibasilar effusions.  Impression: Changes of interstitial edema.    Electronically signed by: Rob Pang MD  Date:    05/02/2024  Time:    08:21        ASSESSMENT   Acute Respiratory Distress   Acute CHF exacerbation  Acute Pulmonary Edema   Paroxsymal Atrial Fibrillation   HTN Emergency    PLAN   Admit to ICU  Cont IV lasix   Vent mgmt  Sedation with Propofol  Get CTA Chest  Echocardiogram pending  F/U cardiology rec's      Prophylaxis: lovenox full dose   Code Status: full code    Critical care time spent on the evaluation and treatment of severe organ dysfunction, review of pertinent labs and imaging studies, discussions with consulting providers and discussions with patient/family 35 minutes     Ernie Kim MD  Hospital Medicine        If the patient has been admitted under observation status, it is at my discretion and under our care with hospital medicine services. [TWA]

## 2024-05-03 PROBLEM — I50.9 ACUTE CHF: Status: ACTIVE | Noted: 2024-05-03

## 2024-05-03 PROBLEM — R06.03 ACUTE RESPIRATORY DISTRESS: Status: ACTIVE | Noted: 2024-05-03

## 2024-05-03 PROBLEM — J96.02 ACUTE RESPIRATORY FAILURE WITH HYPOXIA AND HYPERCARBIA: Status: ACTIVE | Noted: 2024-05-03

## 2024-05-03 PROBLEM — J96.01 ACUTE RESPIRATORY FAILURE WITH HYPOXIA AND HYPERCARBIA: Status: ACTIVE | Noted: 2024-05-03

## 2024-05-03 LAB
ALBUMIN SERPL-MCNC: 3.4 G/DL (ref 3.4–5)
ALBUMIN/GLOB SERPL: 1.2 RATIO
ALP SERPL-CCNC: 90 UNIT/L (ref 50–144)
ALT SERPL-CCNC: 24 UNIT/L (ref 1–45)
ANION GAP SERPL CALC-SCNC: 6 MEQ/L (ref 2–13)
AST SERPL-CCNC: 26 UNIT/L (ref 14–36)
BASE EXCESS BLD CALC-SCNC: 4.8 MMOL/L (ref -2–2)
BASE EXCESS BLD CALC-SCNC: 5.6 MMOL/L (ref -2–2)
BASOPHILS # BLD AUTO: 0.02 X10(3)/MCL (ref 0.01–0.08)
BASOPHILS NFR BLD AUTO: 0.3 % (ref 0.1–1.2)
BILIRUB SERPL-MCNC: 0.6 MG/DL (ref 0–1)
BLOOD GAS SAMPLE TYPE (OHS): ABNORMAL
BLOOD GAS SAMPLE TYPE (OHS): ABNORMAL
BNP BLD-MCNC: 3590 PG/ML (ref 0–124.9)
BUN SERPL-MCNC: 17 MG/DL (ref 7–20)
CALCIUM SERPL-MCNC: 8.6 MG/DL (ref 8.4–10.2)
CHLORIDE SERPL-SCNC: 107 MMOL/L (ref 98–110)
CO2 SERPL-SCNC: 27 MMOL/L (ref 21–32)
COHGB MFR BLDA: 0.9 % (ref 0–1.5)
COHGB MFR BLDA: 1.2 % (ref 0–1.5)
CREAT SERPL-MCNC: 1.18 MG/DL (ref 0.66–1.25)
CREAT/UREA NIT SERPL: 14 (ref 12–20)
EOSINOPHIL # BLD AUTO: 0.05 X10(3)/MCL (ref 0.04–0.36)
EOSINOPHIL NFR BLD AUTO: 0.7 % (ref 0.7–7)
ERYTHROCYTE [DISTWIDTH] IN BLOOD BY AUTOMATED COUNT: 15.3 % (ref 11–14.5)
GFR SERPLBLD CREATININE-BSD FMLA CKD-EPI: 47 MLS/MIN/1.73/M2
GLOBULIN SER-MCNC: 2.8 GM/DL (ref 2–3.9)
GLUCOSE SERPL-MCNC: 109 MG/DL (ref 70–115)
HCO3 BLDA-SCNC: 28.7 MMOL/L (ref 22–26)
HCO3 BLDA-SCNC: 29.5 MMOL/L (ref 22–26)
HCT VFR BLD AUTO: 40.9 % (ref 36–48)
HGB BLD-MCNC: 12.9 G/DL (ref 11.8–16)
IMM GRANULOCYTES # BLD AUTO: 0.04 X10(3)/MCL (ref 0–0.03)
IMM GRANULOCYTES NFR BLD AUTO: 0.6 % (ref 0–0.5)
INHALED O2 CONCENTRATION: 30 %
INHALED O2 CONCENTRATION: 40 %
IP (OHS): 0 CMH2O
LYMPHOCYTES # BLD AUTO: 2.01 X10(3)/MCL (ref 1.16–3.74)
LYMPHOCYTES NFR BLD AUTO: 27.9 % (ref 20–55)
MAGNESIUM SERPL-MCNC: 2.1 MG/DL (ref 1.8–2.4)
MCH RBC QN AUTO: 27.2 PG (ref 27–34)
MCHC RBC AUTO-ENTMCNC: 31.5 G/DL (ref 31–37)
MCV RBC AUTO: 86.3 FL (ref 79–99)
MECH RR (OHS): 18 B/MIN
METHGB MFR BLDA: 0.5 % (ref 0–1.5)
METHGB MFR BLDA: 0.6 % (ref 0–1.5)
MODE (OHS): ABNORMAL
MODE (OHS): AC
MONOCYTES # BLD AUTO: 0.44 X10(3)/MCL (ref 0.24–0.36)
MONOCYTES NFR BLD AUTO: 6.1 % (ref 4.7–12.5)
NEUTROPHILS # BLD AUTO: 4.65 X10(3)/MCL (ref 1.56–6.13)
NEUTROPHILS NFR BLD AUTO: 64.4 % (ref 37–73)
NOTIFIED (OHS): ABNORMAL
NOTIFIED BY (OHS): ABNORMAL
NOTIFIED TIME (OHS): 525
OXYGEN DEVICE BLOOD GAS (OHS): ABNORMAL
PAW @ PEAK INSP FLOW SETTING VENT: 0 CMH20
PCO2 BLDA: 38.5 MMHG (ref 35–45)
PCO2 BLDA: 41.4 MMHG (ref 35–45)
PEEP RESPIRATORY: 5 CMH2O
PEEP RESPIRATORY: 6 CMH2O
PH BLDA: 7.47 [PH] (ref 7.35–7.45)
PH BLDA: 7.48 [PH] (ref 7.35–7.45)
PLATELET # BLD AUTO: 166 X10(3)/MCL (ref 140–371)
PMV BLD AUTO: 10.8 FL (ref 9.4–12.4)
PO2 BLDA: 128 MMHG (ref 80–105)
PO2 BLDA: 78.4 MMHG (ref 80–105)
POTASSIUM SERPL-SCNC: 3.2 MMOL/L (ref 3.5–5.1)
PROT SERPL-MCNC: 6.2 GM/DL (ref 6.3–8.2)
PS (OHS): 12 CMH2O
RBC # BLD AUTO: 4.74 X10(6)/MCL (ref 4–5.1)
SAO2 % BLDA: 97.4 % (ref 95–100)
SAO2 % BLDA: 99.8 % (ref 95–100)
SODIUM SERPL-SCNC: 140 MMOL/L (ref 135–145)
SPONT+MECH VT ON VENT: 400 ML
TROPONIN I SERPL-MCNC: 0.04 NG/ML (ref 0–0.03)
TROPONIN I SERPL-MCNC: 0.06 NG/ML (ref 0–0.03)
WBC # SPEC AUTO: 7.21 X10(3)/MCL (ref 4–11.5)

## 2024-05-03 PROCEDURE — 94761 N-INVAS EAR/PLS OXIMETRY MLT: CPT

## 2024-05-03 PROCEDURE — 36600 WITHDRAWAL OF ARTERIAL BLOOD: CPT

## 2024-05-03 PROCEDURE — C9113 INJ PANTOPRAZOLE SODIUM, VIA: HCPCS | Performed by: FAMILY MEDICINE

## 2024-05-03 PROCEDURE — 83735 ASSAY OF MAGNESIUM: CPT | Performed by: FAMILY MEDICINE

## 2024-05-03 PROCEDURE — 63600175 PHARM REV CODE 636 W HCPCS: Performed by: FAMILY MEDICINE

## 2024-05-03 PROCEDURE — 20000000 HC ICU ROOM

## 2024-05-03 PROCEDURE — 99900026 HC AIRWAY MAINTENANCE (STAT)

## 2024-05-03 PROCEDURE — 25000003 PHARM REV CODE 250: Performed by: FAMILY MEDICINE

## 2024-05-03 PROCEDURE — 84484 ASSAY OF TROPONIN QUANT: CPT | Performed by: NURSE PRACTITIONER

## 2024-05-03 PROCEDURE — 63600175 PHARM REV CODE 636 W HCPCS: Performed by: INTERNAL MEDICINE

## 2024-05-03 PROCEDURE — 82803 BLOOD GASES ANY COMBINATION: CPT

## 2024-05-03 PROCEDURE — 83880 ASSAY OF NATRIURETIC PEPTIDE: CPT | Performed by: INTERNAL MEDICINE

## 2024-05-03 PROCEDURE — 99900035 HC TECH TIME PER 15 MIN (STAT)

## 2024-05-03 PROCEDURE — 85025 COMPLETE CBC W/AUTO DIFF WBC: CPT | Performed by: FAMILY MEDICINE

## 2024-05-03 PROCEDURE — 27100171 HC OXYGEN HIGH FLOW UP TO 24 HOURS

## 2024-05-03 PROCEDURE — 94003 VENT MGMT INPAT SUBQ DAY: CPT

## 2024-05-03 PROCEDURE — 80053 COMPREHEN METABOLIC PANEL: CPT | Performed by: FAMILY MEDICINE

## 2024-05-03 RX ORDER — LISINOPRIL 10 MG/1
20 TABLET ORAL 2 TIMES DAILY
Status: DISCONTINUED | OUTPATIENT
Start: 2024-05-03 | End: 2024-05-07 | Stop reason: HOSPADM

## 2024-05-03 RX ORDER — ACETAMINOPHEN 325 MG/1
650 TABLET ORAL EVERY 6 HOURS PRN
Status: DISCONTINUED | OUTPATIENT
Start: 2024-05-03 | End: 2024-05-03

## 2024-05-03 RX ORDER — DIGOXIN 125 MCG
125 TABLET ORAL DAILY
Status: DISCONTINUED | OUTPATIENT
Start: 2024-05-03 | End: 2024-05-06

## 2024-05-03 RX ORDER — ACETAMINOPHEN 500 MG
1000 TABLET ORAL EVERY 6 HOURS PRN
Status: DISCONTINUED | OUTPATIENT
Start: 2024-05-03 | End: 2024-05-07 | Stop reason: HOSPADM

## 2024-05-03 RX ORDER — HYDRALAZINE HYDROCHLORIDE 20 MG/ML
10 INJECTION INTRAMUSCULAR; INTRAVENOUS EVERY 4 HOURS PRN
Status: DISCONTINUED | OUTPATIENT
Start: 2024-05-03 | End: 2024-05-07 | Stop reason: HOSPADM

## 2024-05-03 RX ORDER — LORAZEPAM 0.5 MG/1
0.5 TABLET ORAL EVERY 6 HOURS PRN
Status: DISCONTINUED | OUTPATIENT
Start: 2024-05-03 | End: 2024-05-07 | Stop reason: HOSPADM

## 2024-05-03 RX ORDER — HYDRALAZINE HYDROCHLORIDE 50 MG/1
50 TABLET, FILM COATED ORAL 2 TIMES DAILY
Status: DISCONTINUED | OUTPATIENT
Start: 2024-05-03 | End: 2024-05-07 | Stop reason: HOSPADM

## 2024-05-03 RX ORDER — HYDROCODONE BITARTRATE AND ACETAMINOPHEN 7.5; 325 MG/1; MG/1
1 TABLET ORAL EVERY 6 HOURS PRN
Status: DISCONTINUED | OUTPATIENT
Start: 2024-05-03 | End: 2024-05-07 | Stop reason: HOSPADM

## 2024-05-03 RX ORDER — POTASSIUM CHLORIDE 20 MEQ/1
40 TABLET, EXTENDED RELEASE ORAL DAILY
Status: DISCONTINUED | OUTPATIENT
Start: 2024-05-04 | End: 2024-05-06

## 2024-05-03 RX ORDER — ENOXAPARIN SODIUM 100 MG/ML
60 INJECTION SUBCUTANEOUS EVERY 12 HOURS
Status: DISCONTINUED | OUTPATIENT
Start: 2024-05-03 | End: 2024-05-04

## 2024-05-03 RX ORDER — POTASSIUM CHLORIDE 7.45 MG/ML
10 INJECTION INTRAVENOUS
Qty: 400 ML | Refills: 0 | Status: DISCONTINUED | OUTPATIENT
Start: 2024-05-03 | End: 2024-05-03

## 2024-05-03 RX ORDER — POTASSIUM CHLORIDE 7.45 MG/ML
10 INJECTION INTRAVENOUS
Status: COMPLETED | OUTPATIENT
Start: 2024-05-03 | End: 2024-05-03

## 2024-05-03 RX ADMIN — DIGOXIN 125 MCG: 125 TABLET ORAL at 02:05

## 2024-05-03 RX ADMIN — POTASSIUM CHLORIDE 10 MEQ: 7.46 INJECTION, SOLUTION INTRAVENOUS at 02:05

## 2024-05-03 RX ADMIN — METOPROLOL TARTRATE 5 MG: 1 INJECTION, SOLUTION INTRAVENOUS at 12:05

## 2024-05-03 RX ADMIN — POTASSIUM CHLORIDE 10 MEQ: 7.46 INJECTION, SOLUTION INTRAVENOUS at 09:05

## 2024-05-03 RX ADMIN — POTASSIUM CHLORIDE 10 MEQ: 7.46 INJECTION, SOLUTION INTRAVENOUS at 12:05

## 2024-05-03 RX ADMIN — PROPOFOL 50 MCG/KG/MIN: 10 INJECTION, EMULSION INTRAVENOUS at 04:05

## 2024-05-03 RX ADMIN — FUROSEMIDE 60 MG: 10 INJECTION, SOLUTION INTRAMUSCULAR; INTRAVENOUS at 08:05

## 2024-05-03 RX ADMIN — LISINOPRIL 20 MG: 10 TABLET ORAL at 02:05

## 2024-05-03 RX ADMIN — PROPOFOL 40 MCG/KG/MIN: 10 INJECTION, EMULSION INTRAVENOUS at 08:05

## 2024-05-03 RX ADMIN — CHLORHEXIDINE GLUCONATE 15 ML: 1.2 RINSE ORAL at 08:05

## 2024-05-03 RX ADMIN — METOPROLOL TARTRATE 5 MG: 1 INJECTION, SOLUTION INTRAVENOUS at 01:05

## 2024-05-03 RX ADMIN — ACETAMINOPHEN 1000 MG: 500 TABLET ORAL at 08:05

## 2024-05-03 RX ADMIN — POTASSIUM CHLORIDE 10 MEQ: 7.46 INJECTION, SOLUTION INTRAVENOUS at 04:05

## 2024-05-03 RX ADMIN — MUPIROCIN: 20 OINTMENT TOPICAL at 08:05

## 2024-05-03 RX ADMIN — MUPIROCIN: 20 OINTMENT TOPICAL at 09:05

## 2024-05-03 RX ADMIN — ENOXAPARIN SODIUM 80 MG: 80 INJECTION SUBCUTANEOUS at 09:05

## 2024-05-03 RX ADMIN — HYDRALAZINE HYDROCHLORIDE 50 MG: 50 TABLET ORAL at 08:05

## 2024-05-03 RX ADMIN — ENOXAPARIN SODIUM 60 MG: 60 INJECTION SUBCUTANEOUS at 09:05

## 2024-05-03 RX ADMIN — PANTOPRAZOLE SODIUM 40 MG: 40 INJECTION, POWDER, LYOPHILIZED, FOR SOLUTION INTRAVENOUS at 08:05

## 2024-05-03 RX ADMIN — ENOXAPARIN SODIUM 60 MG: 60 INJECTION SUBCUTANEOUS at 08:05

## 2024-05-03 RX ADMIN — LISINOPRIL 20 MG: 10 TABLET ORAL at 08:05

## 2024-05-03 RX ADMIN — HYDRALAZINE HYDROCHLORIDE 50 MG: 50 TABLET ORAL at 02:05

## 2024-05-03 RX ADMIN — METOPROLOL TARTRATE 5 MG: 1 INJECTION, SOLUTION INTRAVENOUS at 10:05

## 2024-05-03 NOTE — PROGRESS NOTES
Hospital Medicine  Progress Note    Patient Name: Patsy H Cantu  MRN: [id number]  Status: IP- Inpatient   Admission Date: 5/2/2024  Length of Stay: 1  Date of Service: 05/03/2024       CC: hospital follow-up for        SUBJECTIVE   79 y.o. female with a history that includes pacemaker, PAF on diltiazem, sotalol, and digoxin and HTN presented to ED with c/o of worsening sob and weakness since yesterday. In ER on arrival O2 sats 97% but respiratory status continued to deteriorate while in the ER. Decision was made to intubate patient. BNP > 5,000. CXR show bilateral pulmonary edema/significant vascular congestion. Initial BP was 227/121 mmHg. She was given 80 mg iv lasix. Started on nitropaste. Cardiology consulted recommend cont iv lasix, obtain echocardiogram, use prn iv lopressor 5 mg for atrial fib with rvr.     05/03/2024  Tolerating vent weaning  Diuresed well overnight with iv lasix  BP still running a little high today   Echo EF 35% compared to previous EF 55%  Spoke with Cardiology rec- cont iv lasix diuresis, will need outpatient ischemic eval given changes from previous  Do not restart cardizem    Today: Patient seen and examined at bedside, and chart reviewed.       MEDICATIONS   Scheduled  Current Facility-Administered Medications   Medication Dose Route Frequency    chlorhexidine  15 mL Mouth/Throat BID    enoxparin  60 mg Subcutaneous Q12H (prophylaxis, 0900/2100)    furosemide (LASIX) injection  60 mg Intravenous Q12H    mupirocin   Nasal BID    pantoprazole  40 mg Intravenous Daily    potassium chloride  10 mEq Intravenous Q2H     Continuous Infusions  Current Facility-Administered Medications   Medication Dose Route Frequency Last Rate Last Admin    propofoL  0-50 mcg/kg/min Intravenous Continuous 15 mL/hr at 05/03/24 0930 30 mcg/kg/min at 05/03/24 0930         PHYSICAL EXAM   VITALS: T 97.9 °F (36.6 °C)   BP (!) 171/87   P 60   RR (!) 24   O2 (!) 94 %    GENERAL: sedated on vent  LUNGS: CTA B/L  CVS:  Normal rate  GI/: Soft, NT, bowel sounds positive.  EXTREMITIES: No peripheral edema  NEURO: sedated on ventilator       LABS   CBC  Recent Labs     05/02/24  0746 05/03/24  0411   WBC 6.43 7.21   RBC 4.44 4.74   HGB 12.9 12.9   HCT 38.2 40.9   MCV 86.0 86.3   MCH 29.1 27.2   MCHC 33.8 31.5   RDW 14.6* 15.3*    166     CHEM  Recent Labs     05/02/24  0746 05/03/24  0536    140   K 3.3* 3.2*   CHLORIDE 108 107   CO2 26 27   BUN 13.0 17.0   CREATININE 0.89 1.18   GLUCOSE 112 109   CALCIUM 9.3 8.6   MG 2.10 2.10   ALBUMIN 3.9 3.4   GLOBULIN 3.3 2.8   ALKPHOS 89 90   ALT 23 24   AST 25 26   BILITOT 0.7 0.6   TSH 1.560  --          MICROBIOLOGY     Microbiology Results (last 7 days)       Procedure Component Value Units Date/Time    Respiratory Culture [[id number]] Collected: 05/02/24 0954    Order Status: Completed Specimen: Sputum from Endotracheal Aspirate Updated: 05/03/24 0812     Respiratory Culture No Growth At 24 Hours     GRAM STAIN Quality 2+      No bacteria seen              DIAGNOSTICS   X-Ray Chest AP Portable  Narrative: EXAMINATION:  XR CHEST AP PORTABLE    CLINICAL HISTORY:  Pulmonary edema with shortness of breath respiratory distress requiring intubation.    TECHNIQUE:  Single frontal view of the chest was performed.    COMPARISON:  Chest x-ray and CTA of the chest yesterday 05/02/2024.    FINDINGS:  The ETT tip is at the superior border of the clavicular heads, the NG tube passes into the stomach.  The left-sided AICD with 2 leads entering the heart is similar in position.  There is significant interval decrease in asymmetric interstitial changes and multifocal alveolar opacities in both lungs.  By the CTA, there is a small right posterior dependent lying pleural effusion that is not well visualized on this exam.  The small left posterior dependent lying pleural effusion is partially visualized extending left costophrenic angle.  There is no pneumothorax.  There is similar cardiomegaly  but with decreased central pulmonary venous hypertension.  Impression: 1. Interval improvement in asymmetric interstitial changes in both lungs suspicious for pulmonary edema and central pulmonary venous hypertension with similar cardiomegaly.  2. Interval improvement in multifocal alveolar opacities bilaterally that could be subsegmental atelectasis and/or pneumonitis/pneumonia.  3. Similar small left posterior dependent layering pleural effusion with nonvisualization of previously seen small right posterior dependent layering pleural effusion.    Electronically signed by: Gen Saunders MD  Date:    05/03/2024  Time:    07:58        ASSESSMENT   Acute Respiratory Distress   Acute Respiratory Failure with hypoxia and hypercapnia   Acute CHF exacerbation, systolic dysfunction - EF 35%  Acute Pulmonary Edema   Paroxsymal Atrial Fibrillation   HTN Emergency    PLAN     Admit to ICU  Cont IV lasix   Vent mgmt- cont wean as tolerated  Sedation with Propofol  F/U cardiology rec's  IV hydralazine prn BP > 160     Prophylaxis: lovenox full dose   Code Status: full code    Critical care time spent on the evaluation and treatment of severe organ dysfunction, review of pertinent labs and imaging studies, discussions with consulting providers and discussions with patient/family 34 minutes       Ernie Kim MD  [facility] Medicine

## 2024-05-03 NOTE — PLAN OF CARE
05/03/24 1514   Discharge Reassessment   Assessment Type Discharge Planning Reassessment   Did the patient's condition or plan change since previous assessment? Yes   Discharge Plan discussed with: Patient;Adult children   Communicated DOLLY with patient/caregiver Date not available/Unable to determine   Discharge Plan A Return to nursing home   Discharge Plan B Home Health   DME Needed Upon Discharge  none   Transition of Care Barriers None   Why the patient remains in the hospital Requires continued medical care   Post-Acute Status   Coverage [employer] MCR   Discharge Delays None known at this time

## 2024-05-03 NOTE — CONSULTS
Inpatient Nutrition Assessment    Admit Date: 5/2/2024   Total duration of encounter: 1 day   Patient Age: 79 y.o.    Nutrition Recommendation/Prescription     Continue NPO. Once medically appropriate ADAT to Cardiac Soft and Bite Sized to aid in nutritional intake.     Hold off on ONS due to patient dislike but offer alternatives like yogurt to aid in nutritional intake.     If unable to extubate recommend consider tube feeds of Nutren 2.0 @ 10 ml/hr with goal rate of 35 ml/hr with FWF @ 45 ml/hr.     Continue to encourage PO intake, assist with feeds, and honor patient preferences.    Dietitian will monitor Food and Nutrient Intake: energy intake, Food and Nutrient Adminstration: diet order,  ,  , Anthropometric Measurements: weight, weight change, Biochemical Data, Medical Tests and Procedures: gastrointestinal profile,   and adjust MNT as needed.     Monitoring & Evaluation     Communication of RD Recs: reviewed with RN (and family.)    Reason For Assessment: consult (consult-intubated.)    RD Follow-up?: Yes   Level of Risk/Frequency of Follow-up: moderate    Next Date to be Seen by RD: 05/07/24  Please consult if re-assessment needed sooner.      Nutrition Assessment     Malnutrition Assessment/Nutrition-Focused Physical Exam    No signs or symptoms of malnutrition at this time.     Chart Review    Malnutrition Screening Tool Results   Have you recently lost weight without trying?: No  Have you been eating poorly because of a decreased appetite?: No   MST Score: 0     Diagnosis:  Acute Respiratory Distress   Acute Respiratory Failure with hypoxia and hypercapnia   Acute CHF exacerbation, systolic dysfunction - EF 35%  Acute Pulmonary Edema   Paroxsymal Atrial Fibrillation   HTN Emergency    Past Medical History:   Diagnosis Date    Fluid retention     Hypercholesterolemia     Hypertension     Pacemaker     Paroxysmal atrial fibrillation      Past Surgical History:   Procedure Laterality Date    HYSTERECTOMY       JOINT REPLACEMENT Right     X2    SHOULDER ARTHROSCOPY Left 8/4/2023    Procedure: ARTHROSCOPY, SHOULDER;  Surgeon: Albert Sood MD;  Location: [facility];  Service: Orthopedics;  Laterality: Left;       Scheduled Medications:  chlorhexidine, 15 mL, BID  enoxparin, 60 mg, Q12H (prophylaxis, 0900/2100)  furosemide (LASIX) injection, 60 mg, Q12H  mupirocin, , BID  pantoprazole, 40 mg, Daily  potassium chloride, 10 mEq, Q2H    Continuous Infusions:  propofoL, Last Rate: 30 mcg/kg/min (05/03/24 0930)    PRN Medications:   Current Facility-Administered Medications:     hydrALAZINE, 10 mg, Intravenous, Q4H PRN    metoprolol, 5 mg, Intravenous, Q15 Min PRN    metoprolol, 5 mg, Intravenous, Q15 Min PRN    ondansetron, 4 mg, Intravenous, Q6H PRN    sodium chloride 0.9%, 3 mL, Intravenous, Q6H PRN    Calorie Containing IV Medications: Diprivan @ 20 ml/hr (provides 528 kcal/d)    Nutrition-Related Labs:   Recent Labs   Lab 05/02/24  0746 05/03/24  0411 05/03/24  0536     --  140   K 3.3*  --  3.2*   CALCIUM 9.3  --  8.6   MG 2.10  --  2.10   CHLORIDE 108  --  107   CO2 26  --  27   BUN 13.0  --  17.0   CREATININE 0.89  --  1.18   EGFRNORACEVR 66  --  47   GLUCOSE 112  --  109   BILITOT 0.7  --  0.6   ALKPHOS 89  --  90   ALT 23  --  24   AST 25  --  26   ALBUMIN 3.9  --  3.4   WBC 6.43 7.21  --    HGB 12.9 12.9  --    HCT 38.2 40.9  --      CrCl:    Lab Value: 42    Date: 5/3    Nutrition Orders:  Diet NPO      Appetite/Oral Intake: NPO/NPO  Factors Affecting Nutritional Intake: NPO and on mechanical ventilation  Social Needs Impacting Access to Food: none identified  Food Insecurity: No Food Insecurity (5/2/2024)    Hunger Vital Sign     Worried About Running Out of Food in the Last Year: Never true     Ran Out of Food in the Last Year: Never true     Food/Anabaptist/Cultural Preferences:   / Spiritual, Cultural Beliefs, Anabaptist Practices, Values that Affect Care: no  Food Allergies: Review of patient's  "allergies indicates:  No Known Allergies         Wound(s):       Overview/Hospital Course:    (5/3): Patient intubated and sedated. Family in room report patient typically with a good appetite, eats well and eats what she wants. Patient has not hand any unintentional weight change of note. Patients family also report that patient has no teeth but typically has no issues c/s. Nurse and MD report that patient likely to be extubated today. GI: WDL except GI symptoms, OBESE, OG-LIS, and ROUNDED/LBM-/, : WDL except VOIDING abilities and CATHETER, FUNCTIONAL SCREEN:Eatin - completely dependent, Nutrition: 3-->adequate,Joel Score: 17, GENERALIZED 2+6 EDEMA NOTED, 24 HR I/O: 587/2100.     Anthropometrics    Height: 5' 5" (165.1 cm), Height Method: Stated  Last Weight: 86.7 kg (191 lb 2.2 oz) (24 1102), Weight Method: Bed Scale  BMI (Calculated): 31.8  BMI Classification: BMI Grade: 30 - 34.9- obesity - grade I     Ideal Body Weight (IBW), Female: 125 lb     % Ideal Body Weight, Female (lb): 147.2 %                             Usual Weight Provided By: family/caregiver denies unintentional weight loss    Wt Readings from Last 5 Encounters:   24 86.7 kg (191 lb 2.2 oz)   23 90.7 kg (200 lb)   23 90.7 kg (200 lb)   23 90.7 kg (200 lb)   23 90.7 kg (200 lb)     Weight Change(s) Since Admission:  Admit Weight: 83.5 kg (184 lb) (24 0734)      Estimated Needs    Weight Used For Calorie Calculations: 64.2 kg (141 lb 8.6 oz)  Energy Calorie Requirements (kcal): 3227-2544 KCAL (25-30 KCAL/KG ABW)  Energy Need Method: Kcal/kg  Weight Used For Protein Calculations: 64.2 kg (141 lb 8.6 oz)  Protein Requirements: 64-77 GM PRO (1.0-1.2 GM/KG ABW)  Fluid Requirements (mL): 1605 ML H2O (25 ML/KG ABW)          Enteral Nutrition    Patient not receiving enteral nutrition at this time.    Parenteral Nutrition    Patient not receiving parenteral nutrition support at this time.    Evaluation of " Received Nutrient Intake    Energy Calories Required: not meeting needs  Protein Required: not meeting needs  Fluid Required: not meeting needs  Comments: Intubated and sedated.    Patient Education    Not applicable.           Nutrition Diagnosis     PES: Inadequate energy intake related to inability to consume sufficient nutrients as evidenced by conditions associated with diagnosis or treatment NPO d/t intubated . (new)    Nutrition Interventions     Intervention(s): general/healthful diet, modified composition of meals/snacks, commercial food, and collaboration with other providers    Goal: Meet Greater than 75% of nutritional needs by discharge. (new)    Goal: Consume % of meals/snacks by follow-up. (new)    Nutrition Goals & Monitoring     Dietitian will monitor: Food and Nutrient Intake: energy intakeFood and Nutrient Adminstration: diet order  Anthropometric Measurements: weight, weight changeBiochemical Data, Medical Tests and Procedures: gastrointestinal profile    Discharge planning: too early to determine; pending clinical course  Next Date to be Seen by RD: 05/07/24  Please consult if re-assessment needed sooner.

## 2024-05-03 NOTE — RESPIRATORY THERAPY
05/03/24 1331   Patient Assessment/Suction   Level of Consciousness (AVPU) alert   Respiratory Effort Normal;Unlabored   Expansion/Accessory Muscles/Retractions no use of accessory muscles   All Lung Fields Breath Sounds clear   Rhythm/Pattern, Respiratory assisted mechanically   Cough Frequency with stimulation   Cough Type assisted   Suction Method oral;tracheal   Suction Pressure (mmHg) 120 mmHg   $ Suction Charges Inline Suction Procedure Stat Charge   Secretions Amount small   Secretions Color clear   PRE-TX-O2   Device (Oxygen Therapy) ventilator   Oxygen Concentration (%) 30   SpO2 97 %   Pulse 60   Resp (!) 21   Positioning HOB elevated 30 degrees   Vent Select   Charged w/in last 24h YES   Preset Conventional Ventilator Settings   Ventilation Type VC   Vent Mode Spont   PEEP/CPAP 5 cmH20   Pressure Support 12 cmH20   Insp Rise Time  70 %   I-Trigger Type  V-TRIG   Trigger Sensitivity Flow/I-Trigger 1 L/min   Patient Ventilator Parameters   Resp Rate Total 28 br/min   Peak Airway Pressure 18 cmH20   Mean Airway Pressure 8.5 cmH20   Plateau Pressure 0 cmH20   Exhaled Vt 575 mL   Total Ve 8.84 L/m   Spont Ve 8.84 L   I:E Ratio Measured 1:2.50   Auto PEEP 0 cmH20   Inspired Tidal Volume (VTI) 245 mL   Conventional Ventilator Alarms   Ve High Alarm 20 L/min   Resp Rate High Alarm 40 br/min   Apnea Rate 14   Apnea Volume (mL) 350 mL   Apnea Oxygen Concentration  100   Apnea Flow Rate (L/min) 60   T Apnea 20 sec(s)   Ready to Wean/Extubation Screen   FIO2<=50 (chart decimal) 0.3   MV<16L (chart vol.) 8.84   PEEP <=8 (chart #) 5   Extubation Screen Passed? Passed   Ready to Wean Parameters   $ Extubation Tech Time Tech Time 15 min   F/VT Ratio<105 (RSBI) (!) 36.52   SBT Results Pass   Extubated? Yes   Reason for Extubation Other (see comments)  (MD order, pt improved.)   Ventilator Discontinued Yes   Negative Inspiratory Force   Negative Inspiratory Force (cm H2O) 0   Vital Capacity   Vital Capacity (mL) 0   Pt  educated on extubation process and nods understanding. Oral cavity cleared of secretions. Anchorfast loosened from face. Air removed from ET bulb. ET removed with suctioning in progress. Oral cavity again cleared of secretions. NC at 3lpm placed on patient. Pt tolereated procedure well.

## 2024-05-03 NOTE — CONSULTS
[facility]  [facility]    Patient Name: Patsy H Cantu  MRN: [id number]  Admission Date: 5/2/2024  Hospital Length of Stay: 1 days  Code Status: Full Code   Attending Provider: Ernie Kim MD   Consulting Provider: DENIZ Peterson  Primary Care Physician: Mery Sanon NP  Principal Problem:<principal problem not specified>    Patient information was obtained from past medical records, ER records, and primary team.     Inpatient consult to Cardiology  Consult performed by: Indy Mtz FNP  Consult ordered by: Godwin Lawson MD  Reason for consult: chf        Subjective:     Chief Complaint:    Telecardiology Consult  Location: [facility] ER  Provider: Dr Lawson  Reason: Acute CHF  Duration: > 300 minutes including review of medical records and provider interview ( Patient is intubated )    HPI:   79-year-old female followed by Dr. Mckeon in [place] from a cardiology standpoint.  She has a history of paroxysmal atrial fibrillation in his rate controlled with diltiazem, sotalol, digoxin and anticoagulated with Xarelto.  She had a pacemaker placed about 10 years ago according to the family.  As far as the family knows she has no history of coronary artery disease and has not had any previous stenting.  She does have a history of congestive heart failure in his on Lasix 40 mg daily at home.  According to the family she did well this weekend she went to a wedding she was dancing and walking around without any issues.  Today she started to develop some shortness of breath at home that progressively worsened.  She presented to the ER for evaluation.  Initial EKG done upon arrival showed a tachycardia with some ST segment abnormalities.  Chest x-ray was positive for congestive heart failure.  Blood pressure was 227/121.  She had a decline in status in the ER and required intubation.  Once she was intubated given IV Lasix her blood pressure improved and a repeat  EKG shows an atrial paced rhythm. Currently sedated with propofol.    Initial set of cardiac enzymes are negative.    Currently appears in no acute distress and vital signs are stable.    Current history and past medical history was discussed with her daughters who were at her bedside    5/3: Intubated and sedated. Echo with EF 35% and mid-distal anteroseptum HK. Daughter and son at bedside. She reports prior to hospitalization her mother's only complaint was fatigue but they have some family functions. She lives alone and is independent in her ADLs. Records from Dr Samuel office reviewed. Echo 3/2023 with EF 55%, mild AI, m/m MR. [facility] 2010 min RCA disease    Past Medical History:   Diagnosis Date    Fluid retention     Hypercholesterolemia     Hypertension     Pacemaker     Paroxysmal atrial fibrillation        Past Surgical History:   Procedure Laterality Date    HYSTERECTOMY      JOINT REPLACEMENT Right     X2    SHOULDER ARTHROSCOPY Left 8/4/2023    Procedure: ARTHROSCOPY, SHOULDER;  Surgeon: Albert Sood MD;  Location: [facility];  Service: Orthopedics;  Laterality: Left;       Review of patient's allergies indicates:  No Known Allergies    Current Facility-Administered Medications   Medication Dose Route Frequency Provider Last Rate Last Admin    chlorhexidine 0.12 % solution 15 mL  15 mL Mouth/Throat BID Ernie Kim MD   15 mL at 05/03/24 0854    enoxaparin injection 60 mg  60 mg Subcutaneous Q12H (prophylaxis, 0900/2100) Lv Berry MD   60 mg at 05/03/24 0952    furosemide injection 60 mg  60 mg Intravenous Q12H Godwin Lawson MD   60 mg at 05/03/24 0857    hydrALAZINE injection 10 mg  10 mg Intravenous Q4H PRN Ernie Kim MD        metoprolol injection 5 mg  5 mg Intravenous Q15 Min PRN Jose Luis Mercado Q., NP        metoprolol injection 5 mg  5 mg Intravenous Q15 Min PRN Godwin Lawson MD   5 mg at 05/03/24 1208    mupirocin 2 % ointment   Nasal BID Ernie Kim MD    Given at 05/03/24 0904    ondansetron injection 4 mg  4 mg Intravenous Q6H PRN Godwin Lawson MD        pantoprazole injection 40 mg  40 mg Intravenous Daily Ernie Kim MD   40 mg at 05/03/24 0852    potassium chloride 10 mEq in 100 mL IVPB  10 mEq Intravenous Q2H Ernie Kim  mL/hr at 05/03/24 1209 10 mEq at 05/03/24 1209    propofol (DIPRIVAN) 10 mg/mL infusion  0-50 mcg/kg/min Intravenous Continuous Godwin Lawson MD 15 mL/hr at 05/03/24 0930 30 mcg/kg/min at 05/03/24 0930    sodium chloride 0.9% flush 3 mL  3 mL Intravenous Q6H PRN Godwin Lawson MD         Family History    None       Tobacco Use    Smoking status: Never    Smokeless tobacco: Never   Substance and Sexual Activity    Alcohol use: Never    Drug use: Never    Sexual activity: Not Currently     Review of Systems   Reason unable to perform ROS: Intubated and sedated.     Objective:     Vital Signs (Most Recent):  Temp: 97.9 °F (36.6 °C) (05/03/24 0300)  Pulse: 60 (05/03/24 1207)  Resp: (!) 29 (05/03/24 1207)  BP: (!) 172/88 (05/03/24 1207)  SpO2: (!) 94 % (05/03/24 1207) Vital Signs (24h Range):  Temp:  [97.8 °F (36.6 °C)-99.7 °F (37.6 °C)] 97.9 °F (36.6 °C)  Pulse:  [56-77] 60  Resp:  [0-30] 29  SpO2:  [92 %-100 %] 94 %  BP: (114-177)/() 172/88     Weight: 86.7 kg (191 lb 2.2 oz)  Body mass index is 31.81 kg/m².    SpO2: (!) 94 %         Intake/Output Summary (Last 24 hours) at 5/3/2024 1257  Last data filed at 5/3/2024 1209  Gross per 24 hour   Intake 687 ml   Output 2750 ml   Net -2063 ml       Lines/Drains/Airways       Drain  Duration                  NG/OG Tube 05/02/24 0901 orogastric 16 Fr. Center mouth 1 day         Urethral Catheter 05/02/24 0851 Silicone 16 Fr. 1 day              Airway  Duration                  Airway - Non-Surgical 05/02/24 0847 Endotracheal Tube 1 day              Peripheral Intravenous Line  Duration                  Peripheral IV - Single Lumen 05/02/24 0739 18 G Left  Antecubital 1 day         Peripheral IV - Single Lumen 05/02/24 1102 18 G Right Antecubital 1 day         Peripheral IV - Single Lumen 05/02/24 1150 18 G Anterior;Right Forearm 1 day                    Physical Exam  Constitutional:       Appearance: She is obese.      Comments: Intubated and sedated   HENT:      Head: Normocephalic.   Cardiovascular:      Rate and Rhythm: Normal rate and regular rhythm.   Pulmonary:      Comments: Diminished BS bilat  Abdominal:      Palpations: Abdomen is soft.   Musculoskeletal:      Right lower leg: No edema.      Left lower leg: No edema.   Skin:     General: Skin is warm and dry.   Neurological:      Comments: Unable to evaluate   Psychiatric:      Comments: Unable to evaluate         Significant Labs:   Recent Lab Results  (Last 5 results in the past 24 hours)        05/03/24  0536   05/03/24  0512   05/03/24  0411   05/02/24  1721   05/02/24  1509        Albumin/Globulin Ratio 1.2               Albumin 3.4               ALP 90               ALT 24               Anion Gap 6.0               Ao peak scar         1.92       Ao VTI         29.30       AR Max Scar         4.65       AST 26               AV valve area         2.38       RAO by Velocity Ratio         2.12       AV mean gradient         7       AV index (prosthetic)         0.94       AV peak gradient         15       AV regurgitation pressure 1/2 time         568       AV Velocity Ratio         0.83       Baso #     0.02           Basophil %     0.3           BILIRUBIN TOTAL 0.6               BSA         1.96       BUN 17.0               BUN/CREAT RATIO 14               Calcium 8.6               Chloride 107               CO2 27               Creatinine 1.18               E/A ratio         2.50       E/E' ratio         14.00       eGFR 47  Comment:                      EGFR INTERPRETATION    Beginning 8/15/22 we are reporting the eGFRcr calculation as recommended by the [employer]. The eGFRcr equation  has similar overall performance characteristics to the older equation, but the values may differ by more than 10% particularly at higher values of eGFRcr and younger adult ages.    NKF stages of chronic kidney disease (CKD)  Stage 1: Kidney damage with normal or increased eGFR (>90 mL/min/1.73 m^2)  Stage 2: Mild reduction in GFR (60-89 mL/min/1.73 m^2)  Stage 3a: Moderate reduction in GFR (45-59 mL/min/1.73 m^2)  Stage 3b: Moderate reduction in GFR (30-44 mL/min/1.73 m^2)  Stage 4: Severe reduction in GFR (15-29 mL/min/1.73 m^2)  Stage 5: Kidney failure (GFR <15 mL/min/1.73 m^2)                   Eos #     0.05           Eos %     0.7           E wave deceleration time         197.00       FIO2, Blood gas   40.0     60.0         Globulin, Total 2.8               Glucose 109               Hematocrit     40.9           Hemoglobin     12.9           Immature Grans (Abs)     0.04           Immature Granulocytes     0.6           IP   0.0     0.0         LVOT area         2.5       LV LATERAL E/E' RATIO         11.67       LV SEPTAL E/E' RATIO         17.50       LV EDV BP         103.40       LV Diastolic Volume Index         53.30       Left Ventricular Outflow Tract Mean Gradient         5.00       Left Ventricular Outflow Tract Mean Velocity         0.99       LVOT diameter         1.80       LVOT peak scar         1.60       LVOT stroke volume         69.69       LVOT peak VTI         27.40       LV ESV BP         67.20       LV Systolic Volume Index         34.6       Lymph #     2.01           LYMPH %     27.9           Magnesium  2.10               MCH     27.2           MCHC     31.5           MCV     86.3           Mean e'         0.05       Mech Vt   400     400         MODE   AC     AC         Mono #     0.44           Mono %     6.1           MPV     10.8           MV valve area by continuity eq         3.18       MV mean gradient         1       MV peak gradient         2       MV Peak A Scar         0.28     "   MV Peak E Scar         0.70       MV VTI         21.9       Neut #     4.65           Neut %     64.4           Notified   JESSICA GODOY RN             Notified By   A EDY, RRT             Notified Time   525             Robert's Biplane MOD Ejection Fraction         35       PEEP   6.0     6.0         PIP   0     0         Platelet Count     166           Base Excess, Blood gas   4.80     4.20         CO Hgb   0.9     0.8         POC HCO3   28.7     28.2         Met Hgb   0.6     0.5         POC PCO2   38.5     33.0         POC PH   7.479     7.518         POC PO2   128.0     171.0         Potassium 3.2               ProBNP 3,590.0  Comment:   For ambulatory patients presenting to outpatient facilities with clinical suspicion of HF not previously diagnosed and at least one sign, symptom or risk factor for HF, NT-proBNP II test results should be interpreted as indicated below:    <125(pg/mL):"Negative: Heart Failure Unlikely"    >=125(pg/mL):"Consider Heart Failure as well as other causes of NT-proBNP elevation"                     PROTEIN TOTAL 6.2               Est. RA pres         3       RBC     4.74           RDW     15.3           Mech RR   18     24         RV TB RVSP         5       Sample Type   Arterial Blood     Arterial Blood         sO2, Blood gas   99.8     100.0         Sodium 140               TAPSE         1.49       TDI SEPTAL         0.04       TDI LATERAL         0.06       Triscuspid Valve Regurgitation Peak Gradient         24       TR Max Scar         2.45       Troponin I 0.062               TV resting pulmonary artery pressure         27       WBC     7.21                                  Significant Imaging:   Initial EKG:      Repeat EKG      Imaging:      Results for orders placed during the hospital encounter of 05/02/24    Echo Saline Bubble? No; Ultrasound enhancing contrast? Yes    Interpretation Summary    Left Ventricle: The left ventricle is normal in size. There is " moderately reduced systolic function with a visually estimated ejection fraction of 35%.  The mid to distal anteroseptum appears hypokinetic.  There is diastolic dysfunction.    Right Ventricle: Normal right ventricular cavity size. Systolic function is mildly reduced.    Aortic Valve: There is mild aortic valve sclerosis. Mildly restricted motion. There is mild aortic regurgitation.    Mitral Valve: There is mild (1+) regurgitation.    Tricuspid Valve: There is mild to moderate (1-2+) regurgitation.    Pulmonic Valve: There is no stenosis. There is mild (1+) regurgitation.    The estimated pulmonary artery systolic pressure is 27 mmHg.    AICD/pacemaker lead noted.   Assessment and Plan:     Impression:  Acute onset SOB requiring intubation  - CHF ( on lasix at home ) BNP on admit 5930    Echo with EF 35% and HK    Echo 3/2023 with EF 55%  - initial Tn neg  LBBB - not new  CAD by [facility] 2010 with minimal RCA dz  PAF  - on Sotolol, Dig, Dilt and xarelto at home  - PPM  HTN  Hypokalemia    Plan:   Continue IV Lasix  Replace potassium and continue to follow electrolytes  Trend Troponin  IV metoprolol for now while she is intubated.  If she has RVR could consider amiodarone  IV Hydralazine if needed for BP  Lovenox 1 mg/kg sq bid while off Xarelto   Do not resume Diltiazem secondary to reduced EF  Vent management per primary team  Will need OP ischemic work up with primary cardiologist (Dr Mckeon)    Active Diagnoses:    Diagnosis Date Noted POA    Acute respiratory distress [R06.03] 05/03/2024 Yes    Acute respiratory failure with hypoxia and hypercarbia [J96.01, J96.02] 05/03/2024 Yes    Acute CHF [I50.9] 05/03/2024 Yes      Problems Resolved During this Admission:       VTE Risk Mitigation (From admission, onward)           Ordered     enoxaparin injection 60 mg  Every 12 hours         05/03/24 0930     IP VTE HIGH RISK PATIENT  Once         05/02/24 1125     Place sequential compression device  Until discontinued          05/02/24 6475                    Thank you for your consult.     Indy Mtz, DENIZ  Cardiology   [facility]

## 2024-05-03 NOTE — PLAN OF CARE
Dr. Kim discussing POC with family at .  Problem: Adult Inpatient Plan of Care  Goal: Plan of Care Review  Outcome: Progressing  Goal: Patient-Specific Goal (Individualized)  Outcome: Progressing  Goal: Absence of Hospital-Acquired Illness or Injury  Outcome: Progressing  Goal: Optimal Comfort and Wellbeing  Outcome: Progressing  Goal: Readiness for Transition of Care  Outcome: Progressing     Problem: Infection  Goal: Absence of Infection Signs and Symptoms  Outcome: Progressing     Problem: Fall Injury Risk  Goal: Absence of Fall and Fall-Related Injury  Outcome: Progressing     Problem: Restraint, Nonviolent  Goal: Absence of Harm or Injury  Outcome: Progressing     Problem: Skin Injury Risk Increased  Goal: Skin Health and Integrity  Outcome: Progressing

## 2024-05-04 LAB
ALBUMIN SERPL-MCNC: 3.5 G/DL (ref 3.4–5)
ALBUMIN/GLOB SERPL: 1 RATIO
ALP SERPL-CCNC: 80 UNIT/L (ref 50–144)
ALT SERPL-CCNC: 17 UNIT/L (ref 1–45)
ANION GAP SERPL CALC-SCNC: 8 MEQ/L (ref 2–13)
AST SERPL-CCNC: 25 UNIT/L (ref 14–36)
BASOPHILS # BLD AUTO: 0.01 X10(3)/MCL (ref 0.01–0.08)
BASOPHILS NFR BLD AUTO: 0.2 % (ref 0.1–1.2)
BILIRUB SERPL-MCNC: 1 MG/DL (ref 0–1)
BUN SERPL-MCNC: 21 MG/DL (ref 7–20)
CALCIUM SERPL-MCNC: 8.7 MG/DL (ref 8.4–10.2)
CHLORIDE SERPL-SCNC: 106 MMOL/L (ref 98–110)
CO2 SERPL-SCNC: 27 MMOL/L (ref 21–32)
CREAT SERPL-MCNC: 1.1 MG/DL (ref 0.66–1.25)
CREAT/UREA NIT SERPL: 19 (ref 12–20)
EOSINOPHIL # BLD AUTO: 0.14 X10(3)/MCL (ref 0.04–0.36)
EOSINOPHIL NFR BLD AUTO: 2.7 % (ref 0.7–7)
ERYTHROCYTE [DISTWIDTH] IN BLOOD BY AUTOMATED COUNT: 15 % (ref 11–14.5)
GFR SERPLBLD CREATININE-BSD FMLA CKD-EPI: 51 MLS/MIN/1.73/M2
GLOBULIN SER-MCNC: 3.5 GM/DL (ref 2–3.9)
GLUCOSE SERPL-MCNC: 104 MG/DL (ref 70–115)
HCT VFR BLD AUTO: 50 % (ref 36–48)
HGB BLD-MCNC: 15.5 G/DL (ref 11.8–16)
IMM GRANULOCYTES # BLD AUTO: 0.02 X10(3)/MCL (ref 0–0.03)
IMM GRANULOCYTES NFR BLD AUTO: 0.4 % (ref 0–0.5)
LYMPHOCYTES # BLD AUTO: 1.83 X10(3)/MCL (ref 1.16–3.74)
LYMPHOCYTES NFR BLD AUTO: 34.7 % (ref 20–55)
MAGNESIUM SERPL-MCNC: 2.5 MG/DL (ref 1.8–2.4)
MCH RBC QN AUTO: 27.1 PG (ref 27–34)
MCHC RBC AUTO-ENTMCNC: 31 G/DL (ref 31–37)
MCV RBC AUTO: 87.3 FL (ref 79–99)
MONOCYTES # BLD AUTO: 0.43 X10(3)/MCL (ref 0.24–0.36)
MONOCYTES NFR BLD AUTO: 8.1 % (ref 4.7–12.5)
NEUTROPHILS # BLD AUTO: 2.85 X10(3)/MCL (ref 1.56–6.13)
NEUTROPHILS NFR BLD AUTO: 53.9 % (ref 37–73)
OHS QRS DURATION: 156 MS
OHS QRS DURATION: 162 MS
OHS QTC CALCULATION: 489 MS
OHS QTC CALCULATION: 548 MS
PLATELET # BLD AUTO: 131 X10(3)/MCL (ref 140–371)
PMV BLD AUTO: 10.5 FL (ref 9.4–12.4)
POTASSIUM SERPL-SCNC: 3.9 MMOL/L (ref 3.5–5.1)
PROT SERPL-MCNC: 7 GM/DL (ref 6.3–8.2)
RBC # BLD AUTO: 5.73 X10(6)/MCL (ref 4–5.1)
SODIUM SERPL-SCNC: 141 MMOL/L (ref 135–145)
WBC # SPEC AUTO: 5.28 X10(3)/MCL (ref 4–11.5)

## 2024-05-04 PROCEDURE — 63600175 PHARM REV CODE 636 W HCPCS: Performed by: FAMILY MEDICINE

## 2024-05-04 PROCEDURE — C9113 INJ PANTOPRAZOLE SODIUM, VIA: HCPCS | Performed by: FAMILY MEDICINE

## 2024-05-04 PROCEDURE — 11000001 HC ACUTE MED/SURG PRIVATE ROOM

## 2024-05-04 PROCEDURE — 80053 COMPREHEN METABOLIC PANEL: CPT | Performed by: FAMILY MEDICINE

## 2024-05-04 PROCEDURE — 85025 COMPLETE CBC W/AUTO DIFF WBC: CPT | Performed by: FAMILY MEDICINE

## 2024-05-04 PROCEDURE — 99900035 HC TECH TIME PER 15 MIN (STAT)

## 2024-05-04 PROCEDURE — 21400001 HC TELEMETRY ROOM

## 2024-05-04 PROCEDURE — 83735 ASSAY OF MAGNESIUM: CPT | Performed by: FAMILY MEDICINE

## 2024-05-04 PROCEDURE — 25000003 PHARM REV CODE 250: Performed by: FAMILY MEDICINE

## 2024-05-04 PROCEDURE — 94761 N-INVAS EAR/PLS OXIMETRY MLT: CPT

## 2024-05-04 RX ORDER — ESCITALOPRAM OXALATE 10 MG/1
10 TABLET ORAL DAILY
Status: DISCONTINUED | OUTPATIENT
Start: 2024-05-04 | End: 2024-05-07 | Stop reason: HOSPADM

## 2024-05-04 RX ORDER — FUROSEMIDE 10 MG/ML
40 INJECTION INTRAMUSCULAR; INTRAVENOUS EVERY 12 HOURS
Status: DISCONTINUED | OUTPATIENT
Start: 2024-05-04 | End: 2024-05-05

## 2024-05-04 RX ADMIN — RIVAROXABAN 20 MG: 20 TABLET, FILM COATED ORAL at 09:05

## 2024-05-04 RX ADMIN — FUROSEMIDE 40 MG: 10 INJECTION, SOLUTION INTRAMUSCULAR; INTRAVENOUS at 09:05

## 2024-05-04 RX ADMIN — POTASSIUM CHLORIDE 40 MEQ: 1500 TABLET, EXTENDED RELEASE ORAL at 09:05

## 2024-05-04 RX ADMIN — PANTOPRAZOLE SODIUM 40 MG: 40 INJECTION, POWDER, LYOPHILIZED, FOR SOLUTION INTRAVENOUS at 09:05

## 2024-05-04 RX ADMIN — MUPIROCIN: 20 OINTMENT TOPICAL at 09:05

## 2024-05-04 RX ADMIN — HYDRALAZINE HYDROCHLORIDE 50 MG: 50 TABLET ORAL at 09:05

## 2024-05-04 RX ADMIN — LISINOPRIL 20 MG: 10 TABLET ORAL at 09:05

## 2024-05-04 RX ADMIN — DIGOXIN 125 MCG: 125 TABLET ORAL at 09:05

## 2024-05-04 RX ADMIN — ESCITALOPRAM OXALATE 10 MG: 10 TABLET ORAL at 12:05

## 2024-05-04 RX ADMIN — FUROSEMIDE 60 MG: 10 INJECTION, SOLUTION INTRAMUSCULAR; INTRAVENOUS at 09:05

## 2024-05-04 NOTE — NURSING
Patient given fan for comfort, also orange juice, awake and alert at this time, refused any sleep meds,

## 2024-05-04 NOTE — PROGRESS NOTES
Hospital Medicine  Progress Note    Patient Name: Patsy H Cantu  MRN: [id number]  Status: IP- Inpatient   Admission Date: 5/2/2024  Length of Stay: 2  Date of Service: 05/04/2024       CC: hospital follow-up for        SUBJECTIVE   79 y.o. female with a history that includes pacemaker, PAF on diltiazem, sotalol, and digoxin and HTN presented to ED with c/o of worsening sob and weakness since yesterday. In ER on arrival O2 sats 97% but respiratory status continued to deteriorate while in the ER. Decision was made to intubate patient. BNP > 5,000. CXR show bilateral pulmonary edema/significant vascular congestion. Initial BP was 227/121 mmHg. She was given 80 mg iv lasix. Started on nitropaste. Cardiology consulted recommend cont iv lasix, obtain echocardiogram, use prn iv lopressor 5 mg for atrial fib with rvr.     05/03/2024  Tolerating vent weaning  Diuresed well overnight with iv lasix  BP still running a little high today   Echo EF 35% compared to previous EF 55%  Spoke with Cardiology rec- cont iv lasix diuresis, will need outpatient ischemic eval given changes from previous  Do not restart cardizem    05/04/2024  Successfully extubated yesterday afternoon  VSS  Diuresed well overnight     Today: Patient seen and examined at bedside, and chart reviewed.       MEDICATIONS   Scheduled  Current Facility-Administered Medications   Medication Dose Route Frequency    digoxin  125 mcg Oral Daily    EScitalopram oxalate  10 mg Oral Daily    furosemide (LASIX) injection  40 mg Intravenous Q12H    hydrALAZINE  50 mg Oral BID    lisinopriL  20 mg Oral BID    mupirocin   Nasal BID    pantoprazole  40 mg Intravenous Daily    potassium chloride  40 mEq Oral Daily    rivaroxaban  20 mg Oral Nightly     Continuous Infusions  Current Facility-Administered Medications   Medication Dose Route Frequency Last Rate Last Admin         PHYSICAL EXAM   VITALS: T 97.1 °F (36.2 °C)   /70   P 60   RR (!) 22   O2 97 %    GENERAL: Awake  and in NAD  LUNGS: CTA B/L  CVS: Normal rate  GI/: Soft, NT, bowel sounds positive.  EXTREMITIES: No peripheral edema  NEURO: AAOx3  PSYCH: Cooperative      LABS   CBC  Recent Labs     05/03/24  0411 05/04/24  0408   WBC 7.21 5.28   RBC 4.74 5.73*   HGB 12.9 15.5   HCT 40.9 50.0*   MCV 86.3 87.3   MCH 27.2 27.1   MCHC 31.5 31.0   RDW 15.3* 15.0*    131*     CHEM  Recent Labs     05/02/24  0746 05/03/24  0536 05/04/24  0408    140 141   K 3.3* 3.2* 3.9   CHLORIDE 108 107 106   CO2 26 27 27   BUN 13.0 17.0 21.0*   CREATININE 0.89 1.18 1.10   GLUCOSE 112 109 104   CALCIUM 9.3 8.6 8.7   MG 2.10 2.10 2.50*   ALBUMIN 3.9 3.4 3.5   GLOBULIN 3.3 2.8 3.5   ALKPHOS 89 90 80   ALT 23 24 17   AST 25 26 25   BILITOT 0.7 0.6 1.0   TSH 1.560  --   --          MICROBIOLOGY     Microbiology Results (last 7 days)       Procedure Component Value Units Date/Time    Respiratory Culture [[id number]] Collected: 05/02/24 0954    Order Status: Completed Specimen: Sputum from Endotracheal Aspirate Updated: 05/04/24 0918     Respiratory Culture Normal respiratory flako     GRAM STAIN Quality 2+      No bacteria seen              DIAGNOSTICS   X-Ray Chest AP Portable  Narrative: EXAMINATION:  XR CHEST AP PORTABLE    CLINICAL HISTORY:  resp failure on vent with chf;    TECHNIQUE:  Single frontal view of the chest was performed.    COMPARISON:  05/03/2024    FINDINGS:  Pacing device is in place.  No infiltrates are seen.  Heart is borderline in size.  Costophrenic angles are clear.  There is vascular calcification noted.  Impression: No acute disease is noted.    Electronically signed by: Greg Lopez MD  Date:    05/04/2024  Time:    09:15        ASSESSMENT     Acute Respiratory Distress   Acute Respiratory Failure with hypoxia and hypercapnia   Acute CHF exacerbation, systolic dysfunction - EF 35%  Acute Pulmonary Edema   Paroxsymal Atrial Fibrillation   HTN Emergency     PLAN     Resume home meds  Hold cardizem  Order PT  Step  down to floor today  Cont iv lasix overnight  Wean oxygen support as tolerated        Prophylaxis: jessica Kim MD  [facility]

## 2024-05-04 NOTE — NURSING
Patient states she is comfortable, does not want to be repositioned, does not want a bath and does not want to be turned or repositioned during the night, she stated please just let me sleep

## 2024-05-05 LAB
ALBUMIN SERPL-MCNC: 3.8 G/DL (ref 3.4–5)
ALBUMIN/GLOB SERPL: 1.1 RATIO
ALP SERPL-CCNC: 91 UNIT/L (ref 50–144)
ALT SERPL-CCNC: 16 UNIT/L (ref 1–45)
ANION GAP SERPL CALC-SCNC: 11 MEQ/L (ref 2–13)
AST SERPL-CCNC: 26 UNIT/L (ref 14–36)
BACTERIA SPT CULT: NORMAL
BASOPHILS # BLD AUTO: 0.03 X10(3)/MCL (ref 0.01–0.08)
BASOPHILS NFR BLD AUTO: 0.4 % (ref 0.1–1.2)
BILIRUB SERPL-MCNC: 0.9 MG/DL (ref 0–1)
BUN SERPL-MCNC: 24 MG/DL (ref 7–20)
CALCIUM SERPL-MCNC: 9 MG/DL (ref 8.4–10.2)
CHLORIDE SERPL-SCNC: 102 MMOL/L (ref 98–110)
CO2 SERPL-SCNC: 25 MMOL/L (ref 21–32)
CREAT SERPL-MCNC: 0.96 MG/DL (ref 0.66–1.25)
CREAT/UREA NIT SERPL: 25 (ref 12–20)
EOSINOPHIL # BLD AUTO: 0.25 X10(3)/MCL (ref 0.04–0.36)
EOSINOPHIL NFR BLD AUTO: 3.4 % (ref 0.7–7)
ERYTHROCYTE [DISTWIDTH] IN BLOOD BY AUTOMATED COUNT: 14.7 % (ref 11–14.5)
GFR SERPLBLD CREATININE-BSD FMLA CKD-EPI: 60 MLS/MIN/1.73/M2
GLOBULIN SER-MCNC: 3.6 GM/DL (ref 2–3.9)
GLUCOSE SERPL-MCNC: 106 MG/DL (ref 70–115)
GRAM STN SPEC: NORMAL
GRAM STN SPEC: NORMAL
HCT VFR BLD AUTO: 40.4 % (ref 36–48)
HGB BLD-MCNC: 12.9 G/DL (ref 11.8–16)
IMM GRANULOCYTES # BLD AUTO: 0.03 X10(3)/MCL (ref 0–0.03)
IMM GRANULOCYTES NFR BLD AUTO: 0.4 % (ref 0–0.5)
LYMPHOCYTES # BLD AUTO: 1.34 X10(3)/MCL (ref 1.16–3.74)
LYMPHOCYTES NFR BLD AUTO: 18.1 % (ref 20–55)
MAGNESIUM SERPL-MCNC: 2.5 MG/DL (ref 1.8–2.4)
MCH RBC QN AUTO: 27.7 PG (ref 27–34)
MCHC RBC AUTO-ENTMCNC: 31.9 G/DL (ref 31–37)
MCV RBC AUTO: 86.9 FL (ref 79–99)
MONOCYTES # BLD AUTO: 0.5 X10(3)/MCL (ref 0.24–0.36)
MONOCYTES NFR BLD AUTO: 6.8 % (ref 4.7–12.5)
NEUTROPHILS # BLD AUTO: 5.24 X10(3)/MCL (ref 1.56–6.13)
NEUTROPHILS NFR BLD AUTO: 70.9 % (ref 37–73)
NRBC BLD AUTO-RTO: 0 %
PLATELET # BLD AUTO: 191 X10(3)/MCL (ref 140–371)
PMV BLD AUTO: 10.4 FL (ref 9.4–12.4)
POTASSIUM SERPL-SCNC: 3.9 MMOL/L (ref 3.5–5.1)
PROT SERPL-MCNC: 7.4 GM/DL (ref 6.3–8.2)
RBC # BLD AUTO: 4.65 X10(6)/MCL (ref 4–5.1)
SODIUM SERPL-SCNC: 138 MMOL/L (ref 135–145)
WBC # SPEC AUTO: 7.39 X10(3)/MCL (ref 4–11.5)

## 2024-05-05 PROCEDURE — 94761 N-INVAS EAR/PLS OXIMETRY MLT: CPT

## 2024-05-05 PROCEDURE — 25000003 PHARM REV CODE 250: Performed by: FAMILY MEDICINE

## 2024-05-05 PROCEDURE — 83735 ASSAY OF MAGNESIUM: CPT | Performed by: FAMILY MEDICINE

## 2024-05-05 PROCEDURE — 99900035 HC TECH TIME PER 15 MIN (STAT)

## 2024-05-05 PROCEDURE — 11000001 HC ACUTE MED/SURG PRIVATE ROOM

## 2024-05-05 PROCEDURE — 36415 COLL VENOUS BLD VENIPUNCTURE: CPT | Performed by: FAMILY MEDICINE

## 2024-05-05 PROCEDURE — 80053 COMPREHEN METABOLIC PANEL: CPT | Performed by: FAMILY MEDICINE

## 2024-05-05 PROCEDURE — 27000221 HC OXYGEN, UP TO 24 HOURS

## 2024-05-05 PROCEDURE — C9113 INJ PANTOPRAZOLE SODIUM, VIA: HCPCS | Performed by: FAMILY MEDICINE

## 2024-05-05 PROCEDURE — 85025 COMPLETE CBC W/AUTO DIFF WBC: CPT | Performed by: FAMILY MEDICINE

## 2024-05-05 PROCEDURE — 94799 UNLISTED PULMONARY SVC/PX: CPT

## 2024-05-05 PROCEDURE — 63600175 PHARM REV CODE 636 W HCPCS: Performed by: FAMILY MEDICINE

## 2024-05-05 PROCEDURE — 21400001 HC TELEMETRY ROOM

## 2024-05-05 RX ORDER — FUROSEMIDE 10 MG/ML
40 INJECTION INTRAMUSCULAR; INTRAVENOUS DAILY
Status: DISCONTINUED | OUTPATIENT
Start: 2024-05-06 | End: 2024-05-07

## 2024-05-05 RX ADMIN — HYDRALAZINE HYDROCHLORIDE 50 MG: 50 TABLET ORAL at 08:05

## 2024-05-05 RX ADMIN — MUPIROCIN: 20 OINTMENT TOPICAL at 08:05

## 2024-05-05 RX ADMIN — LISINOPRIL 20 MG: 10 TABLET ORAL at 08:05

## 2024-05-05 RX ADMIN — PANTOPRAZOLE SODIUM 40 MG: 40 INJECTION, POWDER, LYOPHILIZED, FOR SOLUTION INTRAVENOUS at 08:05

## 2024-05-05 RX ADMIN — POTASSIUM CHLORIDE 40 MEQ: 1500 TABLET, EXTENDED RELEASE ORAL at 08:05

## 2024-05-05 RX ADMIN — ONDANSETRON 4 MG: 2 INJECTION INTRAMUSCULAR; INTRAVENOUS at 02:05

## 2024-05-05 RX ADMIN — FUROSEMIDE 40 MG: 10 INJECTION, SOLUTION INTRAMUSCULAR; INTRAVENOUS at 08:05

## 2024-05-05 RX ADMIN — DIGOXIN 125 MCG: 125 TABLET ORAL at 08:05

## 2024-05-05 RX ADMIN — RIVAROXABAN 20 MG: 20 TABLET, FILM COATED ORAL at 08:05

## 2024-05-05 RX ADMIN — ESCITALOPRAM OXALATE 10 MG: 10 TABLET ORAL at 08:05

## 2024-05-06 LAB
ALBUMIN SERPL-MCNC: 4.1 G/DL (ref 3.4–5)
ALBUMIN/GLOB SERPL: 1.2 RATIO
ALP SERPL-CCNC: 87 UNIT/L (ref 50–144)
ALT SERPL-CCNC: 19 UNIT/L (ref 1–45)
ANION GAP SERPL CALC-SCNC: 9 MEQ/L (ref 2–13)
AST SERPL-CCNC: 25 UNIT/L (ref 14–36)
BASOPHILS # BLD AUTO: 0.01 X10(3)/MCL (ref 0.01–0.08)
BASOPHILS NFR BLD AUTO: 0.2 % (ref 0.1–1.2)
BILIRUB SERPL-MCNC: 1 MG/DL (ref 0–1)
BUN SERPL-MCNC: 26 MG/DL (ref 7–20)
CALCIUM SERPL-MCNC: 9.6 MG/DL (ref 8.4–10.2)
CHLORIDE SERPL-SCNC: 99 MMOL/L (ref 98–110)
CO2 SERPL-SCNC: 30 MMOL/L (ref 21–32)
CREAT SERPL-MCNC: 1.23 MG/DL (ref 0.66–1.25)
CREAT/UREA NIT SERPL: 21 (ref 12–20)
EOSINOPHIL # BLD AUTO: 0.01 X10(3)/MCL (ref 0.04–0.36)
EOSINOPHIL NFR BLD AUTO: 0.2 % (ref 0.7–7)
ERYTHROCYTE [DISTWIDTH] IN BLOOD BY AUTOMATED COUNT: 14.5 % (ref 11–14.5)
GFR SERPLBLD CREATININE-BSD FMLA CKD-EPI: 45 MLS/MIN/1.73/M2
GLOBULIN SER-MCNC: 3.3 GM/DL (ref 2–3.9)
GLUCOSE SERPL-MCNC: 130 MG/DL (ref 70–115)
HCT VFR BLD AUTO: 42.6 % (ref 36–48)
HGB BLD-MCNC: 13.7 G/DL (ref 11.8–16)
IMM GRANULOCYTES # BLD AUTO: 0.02 X10(3)/MCL (ref 0–0.03)
IMM GRANULOCYTES NFR BLD AUTO: 0.4 % (ref 0–0.5)
LYMPHOCYTES # BLD AUTO: 0.5 X10(3)/MCL (ref 1.16–3.74)
LYMPHOCYTES NFR BLD AUTO: 10.6 % (ref 20–55)
MCH RBC QN AUTO: 28 PG (ref 27–34)
MCHC RBC AUTO-ENTMCNC: 32.2 G/DL (ref 31–37)
MCV RBC AUTO: 87.1 FL (ref 79–99)
MONOCYTES # BLD AUTO: 0.27 X10(3)/MCL (ref 0.24–0.36)
MONOCYTES NFR BLD AUTO: 5.7 % (ref 4.7–12.5)
NEUTROPHILS # BLD AUTO: 3.89 X10(3)/MCL (ref 1.56–6.13)
NEUTROPHILS NFR BLD AUTO: 82.9 % (ref 37–73)
NRBC BLD AUTO-RTO: 0 %
PLATELET # BLD AUTO: 196 X10(3)/MCL (ref 140–371)
PMV BLD AUTO: 10.2 FL (ref 9.4–12.4)
POTASSIUM SERPL-SCNC: 4.2 MMOL/L (ref 3.5–5.1)
PROT SERPL-MCNC: 7.4 GM/DL (ref 6.3–8.2)
RBC # BLD AUTO: 4.89 X10(6)/MCL (ref 4–5.1)
SODIUM SERPL-SCNC: 138 MMOL/L (ref 135–145)
WBC # SPEC AUTO: 4.7 X10(3)/MCL (ref 4–11.5)

## 2024-05-06 PROCEDURE — 27000221 HC OXYGEN, UP TO 24 HOURS

## 2024-05-06 PROCEDURE — 99900035 HC TECH TIME PER 15 MIN (STAT)

## 2024-05-06 PROCEDURE — 25000003 PHARM REV CODE 250: Performed by: INTERNAL MEDICINE

## 2024-05-06 PROCEDURE — 25000003 PHARM REV CODE 250: Performed by: FAMILY MEDICINE

## 2024-05-06 PROCEDURE — 36415 COLL VENOUS BLD VENIPUNCTURE: CPT | Performed by: FAMILY MEDICINE

## 2024-05-06 PROCEDURE — 94761 N-INVAS EAR/PLS OXIMETRY MLT: CPT

## 2024-05-06 PROCEDURE — 80053 COMPREHEN METABOLIC PANEL: CPT | Performed by: FAMILY MEDICINE

## 2024-05-06 PROCEDURE — 21400001 HC TELEMETRY ROOM

## 2024-05-06 PROCEDURE — 85025 COMPLETE CBC W/AUTO DIFF WBC: CPT | Performed by: FAMILY MEDICINE

## 2024-05-06 PROCEDURE — 63600175 PHARM REV CODE 636 W HCPCS: Performed by: FAMILY MEDICINE

## 2024-05-06 PROCEDURE — 97116 GAIT TRAINING THERAPY: CPT

## 2024-05-06 PROCEDURE — 97161 PT EVAL LOW COMPLEX 20 MIN: CPT

## 2024-05-06 PROCEDURE — 94799 UNLISTED PULMONARY SVC/PX: CPT

## 2024-05-06 PROCEDURE — 11000001 HC ACUTE MED/SURG PRIVATE ROOM

## 2024-05-06 RX ORDER — METOPROLOL SUCCINATE 25 MG/1
25 TABLET, EXTENDED RELEASE ORAL DAILY
Status: DISCONTINUED | OUTPATIENT
Start: 2024-05-06 | End: 2024-05-07 | Stop reason: HOSPADM

## 2024-05-06 RX ORDER — POTASSIUM CHLORIDE 20 MEQ/1
20 TABLET, EXTENDED RELEASE ORAL DAILY
Status: DISCONTINUED | OUTPATIENT
Start: 2024-05-07 | End: 2024-05-07 | Stop reason: HOSPADM

## 2024-05-06 RX ORDER — LEVALBUTEROL INHALATION SOLUTION 1.25 MG/3ML
1.25 SOLUTION RESPIRATORY (INHALATION) EVERY 8 HOURS PRN
Status: DISCONTINUED | OUTPATIENT
Start: 2024-05-06 | End: 2024-05-07 | Stop reason: HOSPADM

## 2024-05-06 RX ORDER — SPIRONOLACTONE 25 MG/1
25 TABLET ORAL DAILY
Status: DISCONTINUED | OUTPATIENT
Start: 2024-05-06 | End: 2024-05-07 | Stop reason: HOSPADM

## 2024-05-06 RX ORDER — PANTOPRAZOLE SODIUM 40 MG/1
40 TABLET, DELAYED RELEASE ORAL DAILY
Status: DISCONTINUED | OUTPATIENT
Start: 2024-05-06 | End: 2024-05-07 | Stop reason: HOSPADM

## 2024-05-06 RX ADMIN — RIVAROXABAN 20 MG: 20 TABLET, FILM COATED ORAL at 08:05

## 2024-05-06 RX ADMIN — FUROSEMIDE 40 MG: 10 INJECTION, SOLUTION INTRAMUSCULAR; INTRAVENOUS at 10:05

## 2024-05-06 RX ADMIN — MUPIROCIN: 20 OINTMENT TOPICAL at 09:05

## 2024-05-06 RX ADMIN — ESCITALOPRAM OXALATE 10 MG: 10 TABLET ORAL at 10:05

## 2024-05-06 RX ADMIN — SPIRONOLACTONE 25 MG: 25 TABLET ORAL at 01:05

## 2024-05-06 RX ADMIN — DIGOXIN 125 MCG: 125 TABLET ORAL at 10:05

## 2024-05-06 RX ADMIN — HYDROCODONE BITARTRATE AND ACETAMINOPHEN 1 TABLET: 7.5; 325 TABLET ORAL at 01:05

## 2024-05-06 RX ADMIN — HYDRALAZINE HYDROCHLORIDE 50 MG: 50 TABLET ORAL at 10:05

## 2024-05-06 RX ADMIN — HYDRALAZINE HYDROCHLORIDE 50 MG: 50 TABLET ORAL at 08:05

## 2024-05-06 RX ADMIN — PANTOPRAZOLE SODIUM 40 MG: 40 TABLET, DELAYED RELEASE ORAL at 10:05

## 2024-05-06 RX ADMIN — LISINOPRIL 20 MG: 10 TABLET ORAL at 08:05

## 2024-05-06 RX ADMIN — POTASSIUM CHLORIDE 40 MEQ: 1500 TABLET, EXTENDED RELEASE ORAL at 10:05

## 2024-05-06 RX ADMIN — LISINOPRIL 20 MG: 10 TABLET ORAL at 10:05

## 2024-05-06 RX ADMIN — METOPROLOL SUCCINATE 25 MG: 25 TABLET, EXTENDED RELEASE ORAL at 08:05

## 2024-05-06 NOTE — PROGRESS NOTES
Pharmacist Intervention IV to PO Note    Patsy H Cantu is a 79 y.o. female being treated with IV medication pantoprazole    Patient Data:    Vital Signs (Most Recent):  Temp: 97.3 °F (36.3 °C) (05/06/24 0709)  Pulse: 68 (05/06/24 0709)  Resp: 18 (05/06/24 0709)  BP: (!) 157/84 (05/06/24 0709)  SpO2: 95 % (05/06/24 0709) Vital Signs (72h Range):  Temp:  [97.1 °F (36.2 °C)-100.1 °F (37.8 °C)]   Pulse:  [57-80]   Resp:  [14-32]   BP: (120-199)/(60-97)   SpO2:  [86 %-98 %]      CBC:  Recent Labs   Lab 05/04/24 0408 05/05/24 0527 05/06/24  0431   WBC 5.28 7.39 4.70   RBC 5.73* 4.65 4.89   HGB 15.5 12.9 13.7   HCT 50.0* 40.4 42.6   * 191 196   MCV 87.3 86.9 87.1   MCH 27.1 27.7 28.0   MCHC 31.0 31.9 32.2     CMP:     Recent Labs   Lab 05/04/24 0408 05/05/24  0527 05/06/24  0431   CALCIUM 8.7 9.0 9.6   ALBUMIN 3.5 3.8 4.1    138 138   K 3.9 3.9 4.2   CO2 27 25 30   BUN 21.0* 24.0* 26.0*   CREATININE 1.10 0.96 1.23   ALKPHOS 80 91 87   ALT 17 16 19   AST 25 26 25   BILITOT 1.0 0.9 1.0       Dietary Orders:  Diet Orders            Diet Heart Healthy: Heart Healthy starting at 05/03 1609            Based on the following criteria, this patient qualifies for intravenous to oral conversion:  [x] The patients gastrointestinal tract is functioning (tolerating medications via oral or enteral route for 24 hours and tolerating food or enteral feeds for 24 hours).  [x] The patient is hemodynamically stable for 24 hours (heart rate <100 beats per minute, systolic blood pressure >99 mm Hg, and respiratory rate <20 breaths per minute).  [x] The patient shows clinical improvement (afebrile for at least 24 hours and white blood cell count downtrending or normalized). Additionally, the patient must be non-neutropenic (absolute neutrophil count >500 cells/mm3).  [x] For antimicrobials, the patient has received IV therapy for at least 24 hours.    IV medication pantoprazole 40mg IV every 24 hours will be changed to oral  medication pantoprazole 40mg PO every 24 hours    Pharmacist's Name: Anna Stewart  Pharmacist's Extension: [phone]

## 2024-05-06 NOTE — PLAN OF CARE
Physician ordered to consult for Skilled Nursing @ [facility]. Referral made to [facility] per phone/fax , spoke with Lubna

## 2024-05-06 NOTE — PROGRESS NOTES
[facility]  Cardiology  Progress Note    Patient Name: Patsy H Cantu  MRN: [id number]  Admission Date: 5/2/2024  Hospital Length of Stay: 4 days  Code Status: Full Code   Attending Provider: Ernie Kim MD   Consulting Provider: Lv Berry MD  Primary Care Physician: Mery Sanon NP  Principal Problem:Acute respiratory failure with hypoxia and hypercarbia    Patient information was obtained from past medical records, ER records, and primary team.       Subjective:     Chief Complaint:    Reason: Acute CHF  Duration: > 300 minutes including review of medical records and provider interview    HPI:   79-year-old female followed by Dr. Mckeon in [place] from a cardiology standpoint.  She has a history of paroxysmal atrial fibrillation in his rate controlled with diltiazem, sotalol, digoxin and anticoagulated with Xarelto.  She had a pacemaker placed about 10 years ago according to the family.  As far as the family knows she has no history of coronary artery disease and has not had any previous stenting.  She does have a history of congestive heart failure in his on Lasix 40 mg daily at home.  According to the family she did well this weekend she went to a wedding she was dancing and walking around without any issues.  Today she started to develop some shortness of breath at home that progressively worsened.  She presented to the ER for evaluation.  Initial EKG done upon arrival showed a tachycardia with some ST segment abnormalities.  Chest x-ray was positive for congestive heart failure.  Blood pressure was 227/121.  She had a decline in status in the ER and required intubation.      5/3: Intubated and sedated. Echo with EF 35% and mid-distal anteroseptum HK. Daughter and son at bedside. She reports prior to hospitalization her mother's only complaint was fatigue but they have some family functions. She lives alone and is independent in her ADLs. Records from Dr Samuel office reviewed.  Echo 3/2023 with EF 55%, mild AI, m/m MR. [facility] 2010 min RCA disease    5/6: extubated on afternoon of 5/3, no longer on oxygen. Mentating well. AAO x 3. No distress. No CP. No SOB.     Past Medical History:   Diagnosis Date    Fluid retention     Hypercholesterolemia     Hypertension     Pacemaker     Paroxysmal atrial fibrillation        Past Surgical History:   Procedure Laterality Date    HYSTERECTOMY      JOINT REPLACEMENT Right     X2    SHOULDER ARTHROSCOPY Left 8/4/2023    Procedure: ARTHROSCOPY, SHOULDER;  Surgeon: Albert Sood MD;  Location: [facility];  Service: Orthopedics;  Laterality: Left;       Review of patient's allergies indicates:  No Known Allergies    Current Facility-Administered Medications   Medication Dose Route Frequency Provider Last Rate Last Admin    acetaminophen tablet 1,000 mg  1,000 mg Oral Q6H PRN Ernie Kim MD   1,000 mg at 05/03/24 2047    digoxin tablet 125 mcg  125 mcg Oral Daily Ernie Kim MD   125 mcg at 05/06/24 1000    EScitalopram oxalate tablet 10 mg  10 mg Oral Daily Ernie Kim MD   10 mg at 05/06/24 1000    furosemide injection 40 mg  40 mg Intravenous Daily Ernie Kim MD   40 mg at 05/06/24 1000    hydrALAZINE injection 10 mg  10 mg Intravenous Q4H PRN Ernie Kim MD        hydrALAZINE tablet 50 mg  50 mg Oral BID Ernie Kim MD   50 mg at 05/06/24 1000    HYDROcodone-acetaminophen 7.5-325 mg per tablet 1 tablet  1 tablet Oral Q6H PRN Ernie Kim MD   1 tablet at 05/06/24 0113    levalbuterol nebulizer solution 1.25 mg  1.25 mg Nebulization Q8H PRN Ernie Kim MD        lisinopriL tablet 20 mg  20 mg Oral BID Ernie Kim MD   20 mg at 05/06/24 1000    LORazepam tablet 0.5 mg  0.5 mg Oral Q6H PRN Ernie Kim MD        metoprolol injection 5 mg  5 mg Intravenous Q15 Min PRN Ernie Kim MD        metoprolol injection 5 mg  5 mg Intravenous Q15 Min PRN Ernie Kim MD   5 mg at  05/03/24 1319    mupirocin 2 % ointment   Nasal BID Ernie Kim MD   Given at 05/06/24 0900    ondansetron injection 4 mg  4 mg Intravenous Q6H PRN Ernie Kim MD   4 mg at 05/05/24 1414    pantoprazole EC tablet 40 mg  40 mg Oral Daily Ernie Kim MD   40 mg at 05/06/24 1000    [START ON 5/7/2024] potassium chloride SA CR tablet 20 mEq  20 mEq Oral Daily Ernie Kim MD        rivaroxaban tablet 20 mg  20 mg Oral Nightly Ernie Kim MD   20 mg at 05/05/24 2025    sodium chloride 0.9% flush 3 mL  3 mL Intravenous Q6H PRN Ernie Kim MD        spironolactone tablet 25 mg  25 mg Oral Daily Ernie Kim MD   25 mg at 05/06/24 1331     Family History    None       Tobacco Use    Smoking status: Never    Smokeless tobacco: Never   Substance and Sexual Activity    Alcohol use: Never    Drug use: Never    Sexual activity: Not Currently     Review of Systems   Constitutional: Negative.   HENT: Negative.     Eyes: Negative.    Cardiovascular:  Negative for chest pain and dyspnea on exertion.   Respiratory: Negative.     Hematologic/Lymphatic: Bruises/bleeds easily.   Skin: Negative.    Musculoskeletal: Negative.    Gastrointestinal: Negative.    Genitourinary: Negative.    Neurological: Negative.    Psychiatric/Behavioral: Negative.     Allergic/Immunologic: Negative.      Objective:     Vital Signs (Most Recent):  Temp: 96.8 °F (36 °C) (05/06/24 1043)  Pulse: (!) 57 (05/06/24 1245)  Resp: 16 (05/06/24 1245)  BP: (!) 147/80 (05/06/24 1331)  SpO2: (!) 92 % (05/06/24 1245) Vital Signs (24h Range):  Temp:  [96.8 °F (36 °C)-97.9 °F (36.6 °C)] 96.8 °F (36 °C)  Pulse:  [57-76] 57  Resp:  [16-20] 16  SpO2:  [90 %-96 %] 92 %  BP: (141-174)/(66-94) 147/80     Weight: 82.6 kg (182 lb)  Body mass index is 30.29 kg/m².    SpO2: (!) 92 %         Intake/Output Summary (Last 24 hours) at 5/6/2024 1344  Last data filed at 5/6/2024 0800  Gross per 24 hour   Intake 480 ml   Output --   Net 480 ml        Lines/Drains/Airways       Peripheral Intravenous Line  Duration                  Peripheral IV - Single Lumen 05/02/24 0739 18 G Left Antecubital 4 days         Peripheral IV - Single Lumen 05/02/24 1150 18 G Anterior;Right Forearm 4 days                    Physical Exam  Constitutional:       Appearance: Normal appearance. She is obese.   HENT:      Head: Normocephalic.      Mouth/Throat:      Mouth: Mucous membranes are moist.   Cardiovascular:      Rate and Rhythm: Normal rate and regular rhythm.      Pulses: Normal pulses.      Heart sounds: Murmur heard.   Pulmonary:      Effort: Pulmonary effort is normal.      Breath sounds: Normal breath sounds.   Abdominal:      Palpations: Abdomen is soft.   Musculoskeletal:         General: Normal range of motion.      Right lower leg: No edema.      Left lower leg: No edema.   Skin:     General: Skin is warm and dry.      Capillary Refill: Capillary refill takes less than 2 seconds.   Neurological:      General: No focal deficit present.      Mental Status: She is alert and oriented to person, place, and time.         Significant Labs:   Recent Lab Results         05/06/24  0431        Albumin/Globulin Ratio 1.2       Albumin 4.1       ALP 87       ALT 19       Anion Gap 9.0       AST 25       Baso # 0.01       Basophil % 0.2       BILIRUBIN TOTAL 1.0       BUN 26.0       BUN/CREAT RATIO 21       Calcium 9.6       Chloride 99       CO2 30       Creatinine 1.23       eGFR 45  Comment:                      EGFR INTERPRETATION    Beginning 8/15/22 we are reporting the eGFRcr calculation as recommended by the [employer]. The eGFRcr equation has similar overall performance characteristics to the older equation, but the values may differ by more than 10% particularly at higher values of eGFRcr and younger adult ages.    NKF stages of chronic kidney disease (CKD)  Stage 1: Kidney damage with normal or increased eGFR (>90 mL/min/1.73 m^2)  Stage 2: Mild  reduction in GFR (60-89 mL/min/1.73 m^2)  Stage 3a: Moderate reduction in GFR (45-59 mL/min/1.73 m^2)  Stage 3b: Moderate reduction in GFR (30-44 mL/min/1.73 m^2)  Stage 4: Severe reduction in GFR (15-29 mL/min/1.73 m^2)  Stage 5: Kidney failure (GFR <15 mL/min/1.73 m^2)           Eos # 0.01       Eos % 0.2       Globulin, Total 3.3       Glucose 130       Hematocrit 42.6       Hemoglobin 13.7       Immature Grans (Abs) 0.02       Immature Granulocytes 0.4       Lymph # 0.50       LYMPH % 10.6       MCH 28.0       MCHC 32.2       MCV 87.1       Mono # 0.27       Mono % 5.7       MPV 10.2       Neut # 3.89       Neut % 82.9       nRBC 0.0       Platelet Count 196       Potassium 4.2       PROTEIN TOTAL 7.4       RBC 4.89       RDW 14.5       Sodium 138       WBC 4.70               Significant Imaging:   Initial EKG:      Repeat EKG      Imaging:      Results for orders placed during the hospital encounter of 05/02/24    Echo Saline Bubble? No; Ultrasound enhancing contrast? Yes    Interpretation Summary    Left Ventricle: The left ventricle is normal in size. There is moderately reduced systolic function with a visually estimated ejection fraction of 35%.  The mid to distal anteroseptum appears hypokinetic.  There is diastolic dysfunction.    Right Ventricle: Normal right ventricular cavity size. Systolic function is mildly reduced.    Aortic Valve: There is mild aortic valve sclerosis. Mildly restricted motion. There is mild aortic regurgitation.    Mitral Valve: There is mild (1+) regurgitation.    Tricuspid Valve: There is mild to moderate (1-2+) regurgitation.    Pulmonic Valve: There is no stenosis. There is mild (1+) regurgitation.    The estimated pulmonary artery systolic pressure is 27 mmHg.    AICD/pacemaker lead noted.        Assessment   Acute hypoxic respiratory failure s/p ETT  - extubated 5/3/24  - LV systolic dysfunction (EF 35% with mid to distal anteroseptal hypokinesis)  - peak Tn 0.06    pAF with  RVR  - now resolved and patient now atrial paced  - on sotalol, dig, dilt and xarelto at home    Baseline LBBB    CAD by [facility] 2010 with minimal RCA disease    PPM in situ    HTN    Hypokalemia- resolved    Plan:   Stop digoxin  Stop sotalol  Start toprol XL 25 mg po daily  Continue lisinopril 20 mg po daily  Continue aldactone 25 mg po daily  Continue xarelto 20 mg po daily  Transition to oral lasix  For ischemic evaluation as an outpatient with Dr Mckeon   F/u with Dr Mckeon as an outpatient      Lv Berry MD  Cardiology   [facility]

## 2024-05-06 NOTE — PROGRESS NOTES
Hospital Medicine  Progress Note    Patient Name: Patsy H Cantu  MRN: [id number]  Status: IP- Inpatient   Admission Date: 5/2/2024  Length of Stay: 3  Date of Service: 05/05/2024       CC: hospital follow-up for        SUBJECTIVE     79 y.o. female with a history that includes pacemaker, PAF on diltiazem, sotalol, and digoxin and HTN presented to ED with c/o of worsening sob and weakness since yesterday. In ER on arrival O2 sats 97% but respiratory status continued to deteriorate while in the ER. Decision was made to intubate patient. BNP > 5,000. CXR show bilateral pulmonary edema/significant vascular congestion. Initial BP was 227/121 mmHg. She was given 80 mg iv lasix. Started on nitropaste. Cardiology consulted recommend cont iv lasix, obtain echocardiogram, use prn iv lopressor 5 mg for atrial fib with rvr.     05/03/2024  Tolerating vent weaning  Diuresed well overnight with iv lasix  BP still running a little high today   Echo EF 35% compared to previous EF 55%  Spoke with Cardiology rec- cont iv lasix diuresis, will need outpatient ischemic eval given changes from previous  Do not restart cardizem     05/04/2024  Successfully extubated yesterday afternoon  VSS  Diuresed well overnight     05/5/2024  Tolerate oxygen wean  Diuresed well overnight   BP stable       Today: Patient seen and examined at bedside, and chart reviewed.       MEDICATIONS   Scheduled  Current Facility-Administered Medications   Medication Dose Route Frequency    digoxin  125 mcg Oral Daily    EScitalopram oxalate  10 mg Oral Daily    [START ON 5/6/2024] furosemide (LASIX) injection  40 mg Intravenous Daily    hydrALAZINE  50 mg Oral BID    lisinopriL  20 mg Oral BID    mupirocin   Nasal BID    pantoprazole  40 mg Intravenous Daily    potassium chloride  40 mEq Oral Daily    rivaroxaban  20 mg Oral Nightly     Continuous Infusions  Current Facility-Administered Medications   Medication Dose Route Frequency Last Rate Last Admin          PHYSICAL EXAM   VITALS: T 97.8 °F (36.6 °C)   BP (!) 146/85   P 76   RR 20   O2 (!) 90 %    GENERAL: Awake and in NAD  LUNGS: CTA B/L  CVS: Normal rate  GI/: Soft, NT, bowel sounds positive.  EXTREMITIES: No peripheral edema  NEURO: AAOx3  PSYCH: Cooperative      LABS   CBC  Recent Labs     05/04/24  0408 05/05/24 0527   WBC 5.28 7.39   RBC 5.73* 4.65   HGB 15.5 12.9   HCT 50.0* 40.4   MCV 87.3 86.9   MCH 27.1 27.7   MCHC 31.0 31.9   RDW 15.0* 14.7*   * 191     CHEM  Recent Labs     05/04/24 0408 05/05/24 0527    138   K 3.9 3.9   CHLORIDE 106 102   CO2 27 25   BUN 21.0* 24.0*   CREATININE 1.10 0.96   GLUCOSE 104 106   CALCIUM 8.7 9.0   MG 2.50* 2.50*   ALBUMIN 3.5 3.8   GLOBULIN 3.5 3.6   ALKPHOS 80 91   ALT 17 16   AST 25 26   BILITOT 1.0 0.9         MICROBIOLOGY     Microbiology Results (last 7 days)       Procedure Component Value Units Date/Time    Respiratory Culture [[id number]] Collected: 05/02/24 0954    Order Status: Completed Specimen: Sputum from Endotracheal Aspirate Updated: 05/05/24 0959     Respiratory Culture Normal respiratory flako     GRAM STAIN Quality 2+      No bacteria seen              DIAGNOSTICS   X-Ray Chest AP Portable  Narrative: EXAMINATION:  XR CHEST AP PORTABLE    CLINICAL HISTORY:  resp failure on vent with chf;    TECHNIQUE:  Single frontal view of the chest was performed.    COMPARISON:  05/03/2024    FINDINGS:  Pacing device is in place.  No infiltrates are seen.  Heart is borderline in size.  Costophrenic angles are clear.  There is vascular calcification noted.  Impression: No acute disease is noted.    Electronically signed by: Greg Lopez MD  Date:    05/04/2024  Time:    09:15        ASSESSMENT     Acute Respiratory Distress   Acute Respiratory Failure with hypoxia and hypercapnia   Acute CHF exacerbation, systolic dysfunction - EF 35%  Acute Pulmonary Edema   Paroxsymal Atrial Fibrillation   HTN Emergency    PLAN     Cont iv lasix  Order  PT  Incentive spirometer q 4 hrs  F/u cardiology rec's        Prophylaxis: jessica Kim MD  [facility]

## 2024-05-06 NOTE — PROGRESS NOTES
Hospital Medicine  Progress Note    Patient Name: Patsy H Cantu  MRN: [id number]  Status: IP- Inpatient   Admission Date: 5/2/2024  Length of Stay: 4  Date of Service: 05/06/2024       CC: hospital follow-up for        SUBJECTIVE   79 y.o. female with a history that includes pacemaker, PAF on diltiazem, sotalol, and digoxin and HTN presented to ED with c/o of worsening sob and weakness since yesterday. In ER on arrival O2 sats 97% but respiratory status continued to deteriorate while in the ER. Decision was made to intubate patient. BNP > 5,000. CXR show bilateral pulmonary edema/significant vascular congestion. Initial BP was 227/121 mmHg. She was given 80 mg iv lasix. Started on nitropaste. Cardiology consulted recommend cont iv lasix, obtain echocardiogram, use prn iv lopressor 5 mg for atrial fib with rvr.     05/03/2024  Tolerating vent weaning  Diuresed well overnight with iv lasix  BP still running a little high today   Echo EF 35% compared to previous EF 55%  Spoke with Cardiology rec- cont iv lasix diuresis, will need outpatient ischemic eval given changes from previous  Do not restart cardizem     05/04/2024  Successfully extubated yesterday afternoon  VSS  Diuresed well overnight      05/5/2024  Tolerate oxygen wean  Diuresed well overnight   BP stable     05/06/2024  BP still elevated  Sob overall better   But gets very winded with exertion/ambualtion  Deny chest pain/dizziness, or diaphoresis  Family want placement- SNF for therapy    Today: Patient seen and examined at bedside, and chart reviewed.       MEDICATIONS   Scheduled  Current Facility-Administered Medications   Medication Dose Route Frequency    digoxin  125 mcg Oral Daily    EScitalopram oxalate  10 mg Oral Daily    furosemide (LASIX) injection  40 mg Intravenous Daily    hydrALAZINE  50 mg Oral BID    lisinopriL  20 mg Oral BID    mupirocin   Nasal BID    pantoprazole  40 mg Oral Daily    potassium chloride  40 mEq Oral Daily    rivaroxaban   20 mg Oral Nightly    spironolactone  25 mg Oral Daily     Continuous Infusions  Current Facility-Administered Medications   Medication Dose Route Frequency Last Rate Last Admin         PHYSICAL EXAM   VITALS: T 96.8 °F (36 °C)   BP (!) 147/80   P 62   RR 18   O2 (!) 90 %    GENERAL: Awake and in NAD  LUNGS: CTA B/L  CVS: Normal rate  GI/: Soft, NT, bowel sounds positive.  EXTREMITIES: No peripheral edema  NEURO: AAOx3  PSYCH: Cooperative      LABS   CBC  Recent Labs     05/05/24 0527 05/06/24 0431   WBC 7.39 4.70   RBC 4.65 4.89   HGB 12.9 13.7   HCT 40.4 42.6   MCV 86.9 87.1   MCH 27.7 28.0   MCHC 31.9 32.2   RDW 14.7* 14.5    196     CHEM  Recent Labs     05/04/24 0408 05/05/24 0527 05/06/24 0431    138 138   K 3.9 3.9 4.2   CHLORIDE 106 102 99   CO2 27 25 30   BUN 21.0* 24.0* 26.0*   CREATININE 1.10 0.96 1.23   GLUCOSE 104 106 130*   CALCIUM 8.7 9.0 9.6   MG 2.50* 2.50*  --    ALBUMIN 3.5 3.8 4.1   GLOBULIN 3.5 3.6 3.3   ALKPHOS 80 91 87   ALT 17 16 19   AST 25 26 25   BILITOT 1.0 0.9 1.0         MICROBIOLOGY     Microbiology Results (last 7 days)       Procedure Component Value Units Date/Time    Respiratory Culture [[id number]] Collected: 05/02/24 0954    Order Status: Completed Specimen: Sputum from Endotracheal Aspirate Updated: 05/05/24 0959     Respiratory Culture Normal respiratory flako     GRAM STAIN Quality 2+      No bacteria seen              DIAGNOSTICS   X-Ray Chest AP Portable  Narrative: EXAMINATION:  STUDY: XR CHEST AP PORTABLE    CLINICAL HISTORY AND TECHNIQUE:  Congestion, shortness of breath    COMPARISON:  05/04/2024    FINDINGS:  A multilead pacemaker overlies left hemithorax with leads appearing well position.The heart is mildly to moderately enlarged with mild vascular congestion is slightly increased interstitial lung markings which are exaggerated by the poor inspiratory effort.I see no lobar or segmental infiltrates.No significant pleural effusions are noted.There  is moderate demineralization of the skeletal structures with mild degenerative changes noted throughout the thoracic spine.  Impression: 1. The heart is mildly to moderately enlarged with vascular congestion and slightly increased interstitial lung markings.    Electronically signed by: Jo Pollack  Date:    05/06/2024  Time:    04:33        ASSESSMENT     Acute Respiratory Distress   Acute Respiratory Failure with hypoxia and hypercapnia   Acute CHF exacerbation, systolic dysfunction - EF 35%  Acute Pulmonary Edema   Paroxsymal Atrial Fibrillation   HTN Emergency  PLAN     Cont  lasix  PT  Breathing tx's  Incentive spirometer q 4 hrs  F/u cardiology rec's  Add aldactone case mgmt consult for SNF      Prophylaxis: jessica Kim MD  [facility]

## 2024-05-06 NOTE — PLAN OF CARE
"  Problem: Adult Inpatient Plan of Care  Goal: Plan of Care Review  Outcome: Progressing  Flowsheets (Taken 5/6/2024 0345)  Plan of Care Reviewed With: patient  Goal: Patient-Specific Goal (Individualized)  Outcome: Progressing  Flowsheets (Taken 5/6/2024 0345)  Individualized Care Needs: ATTEMPT TO WEAN OXYGEN, CURRENTLY ON 1L PER NC WHILE SLEEPING  Anxieties, Fears or Concerns: VERBALIZED CONCERN REGARDING CONTINUED OXYGEN USE, REPORTS NEEDING TO "LOOK INTO GETTING A CPAP"  Patient/Family-Specific Goals (Include Timeframe): "TO GO HOME"  Goal: Absence of Hospital-Acquired Illness or Injury  Outcome: Progressing  Goal: Optimal Comfort and Wellbeing  Outcome: Progressing  Intervention: Monitor Pain and Promote Comfort  Flowsheets (Taken 5/6/2024 0345)  Pain Management Interventions: pain management plan reviewed with patient/caregiver  Goal: Readiness for Transition of Care  Outcome: Progressing     Problem: Infection  Goal: Absence of Infection Signs and Symptoms  Outcome: Progressing  Intervention: Prevent or Manage Infection  Flowsheets (Taken 5/6/2024 0345)  Fever Reduction/Comfort Measures:   lightweight bedding   lightweight clothing     Problem: Fall Injury Risk  Goal: Absence of Fall and Fall-Related Injury  Outcome: Progressing  Intervention: Identify and Manage Contributors  Flowsheets (Taken 5/6/2024 0345)  Self-Care Promotion:   independence encouraged   BADL personal objects within reach   adaptive equipment use encouraged  Medication Review/Management: medications reviewed     Problem: Skin Injury Risk Increased  Goal: Skin Health and Integrity  Outcome: Progressing  Intervention: Promote and Optimize Oral Intake  Flowsheets (Taken 5/6/2024 0345)  Oral Nutrition Promotion:   adaptive equipment use encouraged   calorie-dense foods provided   calorie-dense liquids provided   physical activity promoted     "

## 2024-05-06 NOTE — PLAN OF CARE
Problem: Physical Therapy  Goal: Physical Therapy Goal  Description: Goals to be met by: discharge     Patient will increase functional independence with mobility by performin. Sit to stand transfer with Modified [place] and Supervision  2. Gait  x 100 feet with Stand-by Assistance using Rolling Walker.     Outcome: Progressing

## 2024-05-06 NOTE — PLAN OF CARE
Problem: Adult Inpatient Plan of Care  Goal: Plan of Care Review  5/6/2024 1800 by Karon Gonzales RN  Outcome: Progressing  5/6/2024 1051 by Karon Gonzales RN  Outcome: Progressing  Goal: Patient-Specific Goal (Individualized)  5/6/2024 1800 by Karon Gonzales RN  Outcome: Progressing  5/6/2024 1051 by Karon Gonzales RN  Outcome: Progressing  Goal: Absence of Hospital-Acquired Illness or Injury  5/6/2024 1800 by Karon Gonzales RN  Outcome: Progressing  5/6/2024 1051 by Karon Gonzales RN  Outcome: Progressing  Goal: Optimal Comfort and Wellbeing  5/6/2024 1800 by Karon Gonzales RN  Outcome: Progressing  5/6/2024 1051 by Karon Gonzales RN  Outcome: Progressing  Goal: Readiness for Transition of Care  5/6/2024 1800 by Karon Gonzales RN  Outcome: Progressing  5/6/2024 1051 by Karon Gonzales RN  Outcome: Progressing     Problem: Infection  Goal: Absence of Infection Signs and Symptoms  5/6/2024 1800 by Karon Gonzales RN  Outcome: Progressing  5/6/2024 1051 by Karon Gonzales RN  Outcome: Progressing     Problem: Fall Injury Risk  Goal: Absence of Fall and Fall-Related Injury  5/6/2024 1800 by Karon Gonzales RN  Outcome: Progressing  5/6/2024 1051 by Karon Gonzales RN  Outcome: Progressing     Problem: Skin Injury Risk Increased  Goal: Skin Health and Integrity  5/6/2024 1800 by Karon Gonzales RN  Outcome: Progressing  5/6/2024 1051 by Karon Gonzales RN  Outcome: Progressing

## 2024-05-06 NOTE — PT/OT/SLP PROGRESS
Physical Therapy Treatment    Patient Name:  Patsy H Cantu   MRN:  [id number]    Recommendations:     Discharge Recommendations: Low Intensity Therapy  Discharge Equipment Recommendations:    Barriers to discharge:  current medical status    Assessment:     Patsy H Cantu is a 79 y.o. female admitted with a medical diagnosis of Acute respiratory failure with hypoxia and hypercarbia.  She presents with the following impairments/functional limitations:       Patient found with HOB elevated and on room air. Nurse present to give medication. Patient is agreeable to physical therapy and states she needs to use restroom. Supine to sit completed, SBA. Sit to stand completed with RW, CGA. Gait training completed x15 feet to toilet with RW, CGA/min A for safety. Sit to stand completed from toilet, min A with RW. Gait training then completed x100 feet in hallway with RW, CGA/min A for safety. Patient demo normalized breathing pattern throughout gait training. She returned to her upright chair and was o2 sats immediately assessed. O2 maintained at 94% following gait training. Patient left in upright chair with [employer] representative present and all needs met.    Rehab Prognosis: Good and Fair; patient would benefit from acute skilled PT services to address these deficits and reach maximum level of function.    Recent Surgery: * No surgery found *      Plan:     During this hospitalization, patient to be seen 5 x/week (5-6x/week, 1-2x/day) to address the identified rehab impairments via gait training, therapeutic activities, therapeutic exercises and progress toward the following goals:    Plan of Care Expires:  06/06/24    Subjective     Chief Complaint: none verbalized  Patient/Family Comments/goals: to go home  Pain/Comfort:         Objective:     Communicated with nursing prior to session.  Patient found HOB elevated with peripheral IV, oxygen, pulse ox (continuous) upon PT entry to room.     General Precautions: Standard,  fall  Orthopedic Precautions:    Braces:    Respiratory Status: Room air     Functional Mobility:  Bed Mobility:     Supine to Sit: stand by assistance  Transfers:     Sit to Stand:  contact guard assistance with rolling walker  Toilet Transfer: contact guard assistance and minimum assistance with  rolling walker  using  Step Transfer  Gait: 15 feet, then 100 feet with RW, CGA/min A      AM-PAC 6 CLICK MOBILITY          Treatment & Education:  See above    Patient left up in chair with all lines intact, call button in reach, and nurse notified..    GOALS:   Multidisciplinary Problems       Physical Therapy Goals          Problem: Physical Therapy    Goal Priority Disciplines Outcome Goal Variances Interventions   Physical Therapy Goal     PT, PT/OT Progressing     Description: Goals to be met by: discharge     Patient will increase functional independence with mobility by performin. Sit to stand transfer with Modified [place] and Supervision  2. Gait  x 100 feet with Stand-by Assistance using Rolling Walker.                          Time Tracking:     PT Received On: 24  PT Start Time: 1332     PT Stop Time: 1347  PT Total Time (min): 15 min     Billable Minutes: Gait Training 15    Treatment Type: Treatment  PT/PTA: PT           2024

## 2024-05-06 NOTE — PT/OT/SLP EVAL
Physical Therapy Evaluation    Patient Name:  Patsy H Cantu   MRN:  [id number]    Recommendations:     Discharge Recommendations: Low Intensity Therapy   Discharge Equipment Recommendations:     Barriers to discharge:  current medical status    Assessment:     Patsy H Cantu is a 79 y.o. female admitted with a medical diagnosis of Acute respiratory failure with hypoxia and hypercarbia.  She presents with the following impairments/functional limitations:       Patient found sitting up in chair with daughter present. She is on 1L O2 via NC. She is pleasant and agreeable to physical therapy evaluation. Sit to stand completed, min A with RW, SBA/CGA. Gait training completed x75 feet into hallway with RW, CGA. Patient demo difficulty pushing RW at first, d/t having rollator at home. Patient also demo change in breathing pattern during ambulation. O2 sats monitored immediately following gait training, but sats remained at 92% on room air. Patient returned to upright chair and performed BLE therex: LAQ, hip abd/add, and seated marches. Patient again demo change in breathing pattern but stated she did not feel SOB and her O2 sats remained 91-92% on room air. Patient left in upright chair with daughter present at bedside..    Rehab Prognosis: Good and Fair; patient would benefit from acute skilled PT services to address these deficits and reach maximum level of function.    Recent Surgery: * No surgery found *      Plan:     During this hospitalization, patient to be seen 5 x/week (5-6x/week, 1-2x/day) to address the identified rehab impairments via gait training, therapeutic activities, therapeutic exercises and progress toward the following goals:    Plan of Care Expires:  06/06/24    Subjective     Chief Complaint: none verbalized  Patient/Family Comments/goals: to go home  Pain/Comfort:       Patients cultural, spiritual, Quaker conflicts given the current situation:      Living Environment:  Home alone  Prior to  admission, patients level of function was independent.  Equipment used at home:  .  DME owned (not currently used): none.  Upon discharge, patient will have assistance from family.    Objective:     Communicated with nursing prior to session.  Patient found up in chair with peripheral IV, oxygen, pulse ox (continuous)  upon PT entry to room.    General Precautions: Standard, fall  Orthopedic Precautions:    Braces:    Respiratory Status: Nasal cannula, flow 1 L/min    Exams:  RLE ROM: WFL  RLE Strength: 4-/5  LLE ROM: WFL  LLE Strength: 4-/5    Functional Mobility:  Transfers:     Sit to Stand:  stand by assistance and contact guard assistance with rolling walker  Gait: 75 feet with RW, CGA      AM-PAC 6 CLICK MOBILITY  Total Score:        Treatment & Education:  See above    Patient left up in chair with all lines intact, call button in reach, nurse notified, and daughter present.    GOALS:   Multidisciplinary Problems       Physical Therapy Goals          Problem: Physical Therapy    Goal Priority Disciplines Outcome Goal Variances Interventions   Physical Therapy Goal     PT, PT/OT Progressing     Description: Goals to be met by: discharge     Patient will increase functional independence with mobility by performin. Sit to stand transfer with Modified [place] and Supervision  2. Gait  x 100 feet with Stand-by Assistance using Rolling Walker.                          History:     Past Medical History:   Diagnosis Date    Fluid retention     Hypercholesterolemia     Hypertension     Pacemaker     Paroxysmal atrial fibrillation        Past Surgical History:   Procedure Laterality Date    HYSTERECTOMY      JOINT REPLACEMENT Right     X2    SHOULDER ARTHROSCOPY Left 2023    Procedure: ARTHROSCOPY, SHOULDER;  Surgeon: Albert Sood MD;  Location: [facility];  Service: Orthopedics;  Laterality: Left;       Time Tracking:     PT Received On: 24  PT Start Time: 0830     PT Stop Time: 0855  PT  Total Time (min): 25 min     Billable Minutes: Evaluation 15 and Gait Training 10      05/06/2024

## 2024-05-07 VITALS
RESPIRATION RATE: 20 BRPM | SYSTOLIC BLOOD PRESSURE: 119 MMHG | BODY MASS INDEX: 30.32 KG/M2 | TEMPERATURE: 98 F | DIASTOLIC BLOOD PRESSURE: 64 MMHG | OXYGEN SATURATION: 93 % | HEIGHT: 65 IN | WEIGHT: 182 LBS | HEART RATE: 70 BPM

## 2024-05-07 LAB
ALBUMIN SERPL-MCNC: 3.7 G/DL (ref 3.4–5)
ALBUMIN/GLOB SERPL: 1.2 RATIO
ALP SERPL-CCNC: 85 UNIT/L (ref 50–144)
ALT SERPL-CCNC: 19 UNIT/L (ref 1–45)
ANION GAP SERPL CALC-SCNC: 10 MEQ/L (ref 2–13)
AST SERPL-CCNC: 29 UNIT/L (ref 14–36)
BASOPHILS # BLD AUTO: 0.02 X10(3)/MCL (ref 0.01–0.08)
BASOPHILS NFR BLD AUTO: 0.4 % (ref 0.1–1.2)
BILIRUB SERPL-MCNC: 0.8 MG/DL (ref 0–1)
BUN SERPL-MCNC: 33 MG/DL (ref 7–20)
CALCIUM SERPL-MCNC: 9.1 MG/DL (ref 8.4–10.2)
CHLORIDE SERPL-SCNC: 98 MMOL/L (ref 98–110)
CO2 SERPL-SCNC: 27 MMOL/L (ref 21–32)
CREAT SERPL-MCNC: 1.28 MG/DL (ref 0.66–1.25)
CREAT/UREA NIT SERPL: 26 (ref 12–20)
EOSINOPHIL # BLD AUTO: 0.02 X10(3)/MCL (ref 0.04–0.36)
EOSINOPHIL NFR BLD AUTO: 0.4 % (ref 0.7–7)
ERYTHROCYTE [DISTWIDTH] IN BLOOD BY AUTOMATED COUNT: 14.7 % (ref 11–14.5)
GFR SERPLBLD CREATININE-BSD FMLA CKD-EPI: 43 MLS/MIN/1.73/M2
GLOBULIN SER-MCNC: 3.1 GM/DL (ref 2–3.9)
GLUCOSE SERPL-MCNC: 113 MG/DL (ref 70–115)
HCT VFR BLD AUTO: 38.5 % (ref 36–48)
HGB BLD-MCNC: 12.6 G/DL (ref 11.8–16)
IMM GRANULOCYTES # BLD AUTO: 0.03 X10(3)/MCL (ref 0–0.03)
IMM GRANULOCYTES NFR BLD AUTO: 0.7 % (ref 0–0.5)
LYMPHOCYTES # BLD AUTO: 0.92 X10(3)/MCL (ref 1.16–3.74)
LYMPHOCYTES NFR BLD AUTO: 20.1 % (ref 20–55)
MCH RBC QN AUTO: 28.2 PG (ref 27–34)
MCHC RBC AUTO-ENTMCNC: 32.7 G/DL (ref 31–37)
MCV RBC AUTO: 86.1 FL (ref 79–99)
MONOCYTES # BLD AUTO: 0.48 X10(3)/MCL (ref 0.24–0.36)
MONOCYTES NFR BLD AUTO: 10.5 % (ref 4.7–12.5)
NEUTROPHILS # BLD AUTO: 3.11 X10(3)/MCL (ref 1.56–6.13)
NEUTROPHILS NFR BLD AUTO: 67.9 % (ref 37–73)
NRBC BLD AUTO-RTO: 0 %
PLATELET # BLD AUTO: 194 X10(3)/MCL (ref 140–371)
PMV BLD AUTO: 10.5 FL (ref 9.4–12.4)
POTASSIUM SERPL-SCNC: 4.7 MMOL/L (ref 3.5–5.1)
PROT SERPL-MCNC: 6.8 GM/DL (ref 6.3–8.2)
RBC # BLD AUTO: 4.47 X10(6)/MCL (ref 4–5.1)
SODIUM SERPL-SCNC: 135 MMOL/L (ref 135–145)
WBC # SPEC AUTO: 4.58 X10(3)/MCL (ref 4–11.5)

## 2024-05-07 PROCEDURE — 36415 COLL VENOUS BLD VENIPUNCTURE: CPT | Performed by: FAMILY MEDICINE

## 2024-05-07 PROCEDURE — 80053 COMPREHEN METABOLIC PANEL: CPT | Performed by: FAMILY MEDICINE

## 2024-05-07 PROCEDURE — 99900035 HC TECH TIME PER 15 MIN (STAT)

## 2024-05-07 PROCEDURE — 94799 UNLISTED PULMONARY SVC/PX: CPT

## 2024-05-07 PROCEDURE — 94761 N-INVAS EAR/PLS OXIMETRY MLT: CPT

## 2024-05-07 PROCEDURE — 85025 COMPLETE CBC W/AUTO DIFF WBC: CPT | Performed by: FAMILY MEDICINE

## 2024-05-07 PROCEDURE — 25000003 PHARM REV CODE 250: Performed by: INTERNAL MEDICINE

## 2024-05-07 PROCEDURE — 25000003 PHARM REV CODE 250: Performed by: FAMILY MEDICINE

## 2024-05-07 RX ORDER — METOPROLOL SUCCINATE 25 MG/1
25 TABLET, EXTENDED RELEASE ORAL DAILY
Qty: 90 TABLET | Refills: 3 | Status: SHIPPED | OUTPATIENT
Start: 2024-05-08 | End: 2025-05-08

## 2024-05-07 RX ADMIN — METOPROLOL SUCCINATE 25 MG: 25 TABLET, EXTENDED RELEASE ORAL at 09:05

## 2024-05-07 RX ADMIN — ESCITALOPRAM OXALATE 10 MG: 10 TABLET ORAL at 09:05

## 2024-05-07 RX ADMIN — LISINOPRIL 20 MG: 10 TABLET ORAL at 09:05

## 2024-05-07 RX ADMIN — PANTOPRAZOLE SODIUM 40 MG: 40 TABLET, DELAYED RELEASE ORAL at 09:05

## 2024-05-07 RX ADMIN — HYDRALAZINE HYDROCHLORIDE 50 MG: 50 TABLET ORAL at 09:05

## 2024-05-07 RX ADMIN — POTASSIUM CHLORIDE 20 MEQ: 1500 TABLET, EXTENDED RELEASE ORAL at 09:05

## 2024-05-07 RX ADMIN — SPIRONOLACTONE 25 MG: 25 TABLET ORAL at 09:05

## 2024-05-07 NOTE — PLAN OF CARE
Physician ordered to discharge patient to [facility] as patient was approved by insurance for SN placement. Nursing to call report to [phone].

## 2024-05-07 NOTE — PT/OT/SLP DISCHARGE
Physical Therapy Discharge Summary    Name: Patsy H Cantu  MRN: [id number]   Principal Problem: Acute respiratory failure with hypoxia and hypercarbia     Patient Discharged from acute Physical Therapy on 24.  Please refer to prior PT noted date on 24 for functional status.     Assessment:     Patient appropriate for care in another setting.    Objective:     GOALS:   Multidisciplinary Problems       Physical Therapy Goals          Problem: Physical Therapy    Goal Priority Disciplines Outcome Goal Variances Interventions   Physical Therapy Goal     PT, PT/OT Adequate for Care Transition     Description: Goals to be met by: discharge     Patient will increase functional independence with mobility by performin. Sit to stand transfer with Modified [place] and Supervision  2. Gait  x 100 feet with Stand-by Assistance using Rolling Walker.                          Reasons for Discontinuation of Therapy Services  Transfer to alternate level of care.      Plan:     Patient Discharged to: Skilled Nursing Facility.      2024

## 2024-05-07 NOTE — DISCHARGE SUMMARY
Hospital Medicine  Discharge Summary    Patient Name: Patsy H Cantu  MRN: [id number]  Admit Date: 5/2/2024  Discharge Date:  5/7/2024  Status: IP- Inpatient   Length of Stay: 5      PHYSICIANS   Admitting Physician: Ernie Kim MD  Discharging Physician: Ernie Kim MD.  Primary Care Physician: Mery Sanon, NP        DISCHARGE DIAGNOSES   Acute Respiratory Distress   Acute Respiratory Failure with hypoxia and hypercapnia   Acute CHF exacerbation, systolic dysfunction - EF 35%  Acute Pulmonary Edema   Paroxsymal Atrial Fibrillation   HTN Emergency      PROCEDURES   * No surgery found *         HOSPITAL COURSE    79 y.o. female with a history that includes pacemaker, PAF on diltiazem, sotalol, and digoxin and HTN presented to ED with c/o of worsening sob and weakness since yesterday. In ER on arrival O2 sats 97% but respiratory status continued to deteriorate while in the ER. Decision was made to intubate patient. BNP > 5,000. CXR show bilateral pulmonary edema/significant vascular congestion. Initial BP was 227/121 mmHg. She was given 80 mg iv lasix. Started on nitropaste. Cardiology consulted recommend cont iv lasix, obtain echocardiogram, use prn iv lopressor 5 mg for atrial fib with rvr.     05/03/2024  Tolerating vent weaning  Diuresed well overnight with iv lasix  BP still running a little high today   Echo EF 35% compared to previous EF 55%  Spoke with Cardiology rec- cont iv lasix diuresis, will need outpatient ischemic eval given changes from previous  Do not restart cardizem     05/04/2024  Successfully extubated yesterday afternoon  VSS  Diuresed well overnight      05/5/2024  Tolerate oxygen wean  Diuresed well overnight   BP stable      05/06/2024  BP still elevated  Sob overall better   But gets very winded with exertion/ambualtion  Deny chest pain/dizziness, or diaphoresis  Family want placement- SNF for therapy    05/07/2024  Diuresed well   Sob better   Accepted to SNF  Plan follow  up dr carroll once discharged       STATUS  Improved, Stable    DISPOSITION  Discharge to home     DIET  cardiac    ACTIVITY  As tolerated      FOLLOW-UP         DISCHARGE MEDICATION RECONCILIATION        Medication List        START taking these medications      metoprolol succinate 25 MG 24 hr tablet  Commonly known as: TOPROL-XL  Take 1 tablet (25 mg total) by mouth once daily.  Start taking on: May 8, 2024            CONTINUE taking these medications      acetaminophen 500 MG tablet  Commonly known as: TYLENOL     benazepriL 20 MG tablet  Commonly known as: LOTENSIN     digoxin 125 mcg tablet  Commonly known as: LANOXIN     EScitalopram oxalate 10 MG tablet  Commonly known as: LEXAPRO     furosemide 40 MG tablet  Commonly known as: LASIX     hydrALAZINE 50 MG tablet  Commonly known as: APRESOLINE     HYDROcodone-acetaminophen 7.5-325 mg per tablet  Commonly known as: NORCO  Take 1 tablet by mouth every 6 (six) hours as needed for Pain.     hydrOXYzine pamoate 25 MG Cap  Commonly known as: VISTARIL     potassium chloride 10 MEQ Tbsr  Commonly known as: KLOR-CON     sotaloL 240 MG Tab  Commonly known as: BETAPACE     VASCEPA 1 gram Cap  Generic drug: icosapent ethyL     XARELTO 20 mg Tab  Generic drug: rivaroxaban            STOP taking these medications      diltiaZEM 120 MG tablet  Commonly known as: CARDIZEM               Where to Get Your Medications        These medications were sent to [facility] - [address] [address]      Phone: [phone]   metoprolol succinate 25 MG 24 hr tablet           PHYSICAL EXAM   VITALS: T 98.6 °F (37 °C)   /69   P (!) 58   RR 20   O2 99 %      GENERAL: Awake and in NAD  LUNGS: CTA B/L  CVS: Normal rate  GI/: Soft, NT, bowel sounds positive.  EXTREMITIES: No peripheral edema  NEURO: AAOx3  PSYCH: Cooperative      DIAGNOSITCS   CBC:   Recent Labs   Lab 05/05/24  0527 05/06/24  0431 05/07/24  0421   WBC 7.39 4.70  "4.58   HGB 12.9 13.7 12.6   HCT 40.4 42.6 38.5    196 194       CMP:   Recent Labs   Lab 05/03/24  0536 05/04/24  0408 05/05/24  0527 05/06/24  0431 05/07/24  0421   CALCIUM 8.6 8.7 9.0 9.6 9.1   ALBUMIN 3.4 3.5 3.8 4.1 3.7    141 138 138 135   K 3.2* 3.9 3.9 4.2 4.7   CO2 27 27 25 30 27   BUN 17.0 21.0* 24.0* 26.0* 33.0*   CREATININE 1.18 1.10 0.96 1.23 1.28*   ALKPHOS 90 80 91 87 85   ALT 24 17 16 19 19   AST 26 25 26 25 29   BILITOT 0.6 1.0 0.9 1.0 0.8   MG 2.10 2.50* 2.50*  --   --      Estimated Creatinine Clearance: 37.8 mL/min (A) (based on SCr of 1.28 mg/dL (H)).    Labs within the past 24 hours have been reviewed.     COAG:  No results for input(s): "APTT", "INR", "PTT" in the last 168 hours.    CARDIAC ENZYMES: No results for input(s): "TROPONINI", "CPK", "CKMB" in the last 72 hours.     No results for input(s): "AMYLASE", "LIPASE" in the last 168 hours.    No results for input(s): "POCTGLUCOSE" in the last 72 hours.     Microbiology Results (last 7 days)       Procedure Component Value Units Date/Time    Respiratory Culture [[id number]] Collected: 05/02/24 0954    Order Status: Completed Specimen: Sputum from Endotracheal Aspirate Updated: 05/05/24 0959     Respiratory Culture Normal respiratory flako     GRAM STAIN Quality 2+      No bacteria seen             No results for input(s): "CHOL", "TRIG", "HDL", "LDL" in the last 72 hours. No results found for: "LABA1C", "HGBA1C"     X-Ray Chest AP Portable    Result Date: 5/6/2024  EXAMINATION: STUDY: XR CHEST AP PORTABLE CLINICAL HISTORY AND TECHNIQUE: Congestion, shortness of breath COMPARISON: 05/04/2024 FINDINGS: A multilead pacemaker overlies left hemithorax with leads appearing well position.The heart is mildly to moderately enlarged with mild vascular congestion is slightly increased interstitial lung markings which are exaggerated by the poor inspiratory effort.I see no lobar or segmental infiltrates.No significant pleural effusions are " noted.There is moderate demineralization of the skeletal structures with mild degenerative changes noted throughout the thoracic spine.     1. The heart is mildly to moderately enlarged with vascular congestion and slightly increased interstitial lung markings. Electronically signed by: Jo Pollack Date:    05/06/2024 Time:    04:33    X-Ray Chest AP Portable    Result Date: 5/4/2024  EXAMINATION: XR CHEST AP PORTABLE CLINICAL HISTORY: resp failure on vent with chf; TECHNIQUE: Single frontal view of the chest was performed. COMPARISON: 05/03/2024 FINDINGS: Pacing device is in place.  No infiltrates are seen.  Heart is borderline in size.  Costophrenic angles are clear.  There is vascular calcification noted.     No acute disease is noted. Electronically signed by: Greg Lopez MD Date:    05/04/2024 Time:    09:15    X-Ray Chest AP Portable    Result Date: 5/3/2024  EXAMINATION: XR CHEST AP PORTABLE CLINICAL HISTORY: Pulmonary edema with shortness of breath respiratory distress requiring intubation. TECHNIQUE: Single frontal view of the chest was performed. COMPARISON: Chest x-ray and CTA of the chest yesterday 05/02/2024. FINDINGS: The ETT tip is at the superior border of the clavicular heads, the NG tube passes into the stomach.  The left-sided AICD with 2 leads entering the heart is similar in position.  There is significant interval decrease in asymmetric interstitial changes and multifocal alveolar opacities in both lungs.  By the CTA, there is a small right posterior dependent lying pleural effusion that is not well visualized on this exam.  The small left posterior dependent lying pleural effusion is partially visualized extending left costophrenic angle.  There is no pneumothorax.  There is similar cardiomegaly but with decreased central pulmonary venous hypertension.     1. Interval improvement in asymmetric interstitial changes in both lungs suspicious for pulmonary edema and central pulmonary venous  hypertension with similar cardiomegaly. 2. Interval improvement in multifocal alveolar opacities bilaterally that could be subsegmental atelectasis and/or pneumonitis/pneumonia. 3. Similar small left posterior dependent layering pleural effusion with nonvisualization of previously seen small right posterior dependent layering pleural effusion. Electronically signed by: Gen Saunders MD Date:    05/03/2024 Time:    07:58    Echo Saline Bubble? No; Ultrasound enhancing contrast? Yes    Result Date: 5/2/2024    Left Ventricle: The left ventricle is normal in size. There is moderately reduced systolic function with a visually estimated ejection fraction of 35%.  The mid to distal anteroseptum appears hypokinetic.  There is diastolic dysfunction.   Right Ventricle: Normal right ventricular cavity size. Systolic function is mildly reduced.   Aortic Valve: There is mild aortic valve sclerosis. Mildly restricted motion. There is mild aortic regurgitation.   Mitral Valve: There is mild (1+) regurgitation.   Tricuspid Valve: There is mild to moderate (1-2+) regurgitation.   Pulmonic Valve: There is no stenosis. There is mild (1+) regurgitation.   The estimated pulmonary artery systolic pressure is 27 mmHg.   AICD/pacemaker lead noted.     CTA Chest Non-Coronary (PE Studies)    Result Date: 5/2/2024  EXAMINATION: CTA CHEST NON CORONARY (PE STUDIES) CLINICAL HISTORY: Pulmonary embolism (PE) suspected, positive D-dimer; TECHNIQUE: Multiple cross-section of the obtained from the thoracic inlet to the upper abdomen after the intravenous administration of 75 mL of Omnipaque 350.  Coronal and sagittal reformatted images were obtained.  Automated dose exposure technique was utilized.  This limits radiation does the patient.  MIP images were obtained. COMPARISON: None FINDINGS: Heart size enlarged with coronary calcifications.  No evidence for reflux of contrast in the IVC.  No shift of the interventricular septum.  The course of the  thoracic aorta is normal with aneurysm ascending thoracic aorta measuring up to 4.7 cm.  The sino-tubular junction appears to be maintained.  A triple vessel arch is identified.  The main pulmonary artery is normal caliber with adequate opacification.  No evidence for central or distal filling defect.  Shotty lymph nodes are identified. Involving the upper lungs bibasilar consolidative changes are noted greater on the right than left the smooth interseptal thickening is was mosaic attenuation.  Bibasilar effusions are noted with compressive atelectatic changes.  No evidence for pulmonary infarct.  The trachea and airways are patent.  An endotracheal tube is identified at the level of the clavicular heads. The visualized hollow and solid viscera of the upper abdomen are grossly normal. No suspicious osseous lesions.  Soft tissues are grossly normal.     1. No evidence for pulmonary embolism. 2. Bibasilar consolidative changes lungs with superimposed interstitial edema. 3. Bibasilar effusions are noted. 4. Other secondary findings as above. Electronically signed by: Rob Pang MD Date:    05/02/2024 Time:    15:05    X-Ray Chest AP Portable    Result Date: 5/2/2024  EXAMINATION: XR CHEST AP PORTABLE CLINICAL HISTORY: Encounter for other preprocedural examination TECHNIQUE: Single frontal view of the chest was performed. COMPARISON: 05/24/2024 FINDINGS: Interval placement of enteric tube descends with the diaphragm with the distal tip in saddle distal to the GE junction.  Tube is identified at the level of the clavicular heads.  Left chest wall pacer is unchanged in position. The heart size is enlarged with pulmonary vascular congestion. Developing patchy airspace opacities especially in the upper lobe distribution.  No evidence for effusion     Lines and tubes as above with developing pulmonary edema. Electronically signed by: Rob Pang MD Date:    05/02/2024 Time:    09:30    X-Ray Chest AP Portable    Result  Date: 5/2/2024  EXAMINATION: XR CHEST AP PORTABLE CLINICAL HISTORY: Cough, unspecified TECHNIQUE: Single frontal view of the chest was performed. COMPARISON: 08/01/2023 FINDINGS: Heart size enlarged with left chest wall pacer.  Pulmonary vasculature is congested. Dependent atelectatic changes lungs with small bibasilar effusions.     Changes of interstitial edema. Electronically signed by: Rob Pang MD Date:    05/02/2024 Time:    08:21      N/A     Patient Screened for food insecurity, housing instability, transportation needs, utility difficulties, and interpersonal safety.  No needs identified    Discharge time: 33 minutes         Ernie Kim MD  [facility]

## 2024-05-07 NOTE — NURSING
Report given to Dorothea at [facility]. Pt daughter marciano akins notified of pt. Going to [facility]

## 2024-05-07 NOTE — PLAN OF CARE
05/07/24 0926   Final Note   Assessment Type Final Discharge Note   Anticipated Discharge Disposition SNF  (Critical access hospital)   What phone number can be called within the next 1-3 days to see how you are doing after discharge? [phone]   Hospital Resources/Appts/Education Provided Post-Acute resouces added to AVS   Post-Acute Status   Post-Acute Authorization Placement   Post-Acute Placement Status Set-up Complete/Auth obtained   Coverage [employer] MCR   Patient choice form signed by patient/caregiver List with quality metrics by geographic area provided;List from CMS Compare;List from System Post-Acute Care   Discharge Delays None known at this time

## 2024-05-07 NOTE — PLAN OF CARE
Problem: Physical Therapy  Goal: Physical Therapy Goal  Description: Goals to be met by: discharge     Patient will increase functional independence with mobility by performin. Sit to stand transfer with Modified [place] and Supervision  2. Gait  x 100 feet with Stand-by Assistance using Rolling Walker.     Outcome: Adequate for Care Transition

## 2024-07-04 ENCOUNTER — HOSPITAL ENCOUNTER (INPATIENT)
Facility: HOSPITAL | Age: 79
LOS: 1 days | Discharge: HOME | DRG: 282 | End: 2024-07-06
Admitting: FAMILY MEDICINE
Payer: MEDICARE

## 2024-07-04 DIAGNOSIS — E87.6 HYPOKALEMIA: ICD-10-CM

## 2024-07-04 DIAGNOSIS — R79.89 ELEVATED TROPONIN: ICD-10-CM

## 2024-07-04 DIAGNOSIS — I48.91 ATRIAL FIBRILLATION WITH RVR: Primary | ICD-10-CM

## 2024-07-04 DIAGNOSIS — R07.9 CHEST PAIN: ICD-10-CM

## 2024-07-04 LAB
ALBUMIN SERPL-MCNC: 4.2 G/DL (ref 3.4–5)
ALBUMIN/GLOB SERPL: 1.4 RATIO
ALP SERPL-CCNC: 98 UNIT/L (ref 50–144)
ALT SERPL-CCNC: 19 UNIT/L (ref 1–45)
ANION GAP SERPL CALC-SCNC: 5 MEQ/L (ref 2–13)
AST SERPL-CCNC: 27 UNIT/L (ref 14–36)
BASOPHILS # BLD AUTO: 0.04 X10(3)/MCL (ref 0.01–0.08)
BASOPHILS NFR BLD AUTO: 0.5 % (ref 0.1–1.2)
BILIRUB SERPL-MCNC: 0.8 MG/DL (ref 0–1)
BNP BLD-MCNC: 421 PG/ML (ref 0–124.9)
BUN SERPL-MCNC: 13 MG/DL (ref 7–20)
CALCIUM SERPL-MCNC: 9.7 MG/DL (ref 8.4–10.2)
CHLORIDE SERPL-SCNC: 104 MMOL/L (ref 98–110)
CO2 SERPL-SCNC: 31 MMOL/L (ref 21–32)
CREAT SERPL-MCNC: 1 MG/DL (ref 0.66–1.25)
CREAT/UREA NIT SERPL: 13 (ref 12–20)
EOSINOPHIL # BLD AUTO: 0.21 X10(3)/MCL (ref 0.04–0.36)
EOSINOPHIL NFR BLD AUTO: 2.4 % (ref 0.7–7)
ERYTHROCYTE [DISTWIDTH] IN BLOOD BY AUTOMATED COUNT: 14.8 % (ref 11–14.5)
GFR SERPLBLD CREATININE-BSD FMLA CKD-EPI: 57 ML/MIN/1.73/M2
GLOBULIN SER-MCNC: 3.1 GM/DL (ref 2–3.9)
GLUCOSE SERPL-MCNC: 119 MG/DL (ref 70–115)
HCT VFR BLD AUTO: 42.1 % (ref 36–48)
HGB BLD-MCNC: 14.1 G/DL (ref 11.8–16)
IMM GRANULOCYTES # BLD AUTO: 0.03 X10(3)/MCL (ref 0–0.03)
IMM GRANULOCYTES NFR BLD AUTO: 0.3 % (ref 0–0.5)
LYMPHOCYTES # BLD AUTO: 2.17 X10(3)/MCL (ref 1.16–3.74)
LYMPHOCYTES NFR BLD AUTO: 25.1 % (ref 20–55)
MAGNESIUM SERPL-MCNC: 2 MG/DL (ref 1.8–2.4)
MCH RBC QN AUTO: 28.2 PG (ref 27–34)
MCHC RBC AUTO-ENTMCNC: 33.5 G/DL (ref 31–37)
MCV RBC AUTO: 84.2 FL (ref 79–99)
MONOCYTES # BLD AUTO: 0.6 X10(3)/MCL (ref 0.24–0.36)
MONOCYTES NFR BLD AUTO: 7 % (ref 4.7–12.5)
NEUTROPHILS # BLD AUTO: 5.58 X10(3)/MCL (ref 1.56–6.13)
NEUTROPHILS NFR BLD AUTO: 64.7 % (ref 37–73)
NRBC BLD AUTO-RTO: 0 %
PLATELET # BLD AUTO: 196 X10(3)/MCL (ref 140–371)
PMV BLD AUTO: 10 FL (ref 9.4–12.4)
POTASSIUM SERPL-SCNC: 3.2 MMOL/L (ref 3.5–5.1)
PROT SERPL-MCNC: 7.3 GM/DL (ref 6.3–8.2)
RBC # BLD AUTO: 5 X10(6)/MCL (ref 4–5.1)
SODIUM SERPL-SCNC: 140 MMOL/L (ref 136–145)
TROPONIN I SERPL-MCNC: 0.13 NG/ML (ref 0–0.03)
WBC # BLD AUTO: 8.63 X10(3)/MCL (ref 4–11.5)

## 2024-07-04 PROCEDURE — 96375 TX/PRO/DX INJ NEW DRUG ADDON: CPT

## 2024-07-04 PROCEDURE — 99291 CRITICAL CARE FIRST HOUR: CPT

## 2024-07-04 PROCEDURE — 85025 COMPLETE CBC W/AUTO DIFF WBC: CPT

## 2024-07-04 PROCEDURE — 83735 ASSAY OF MAGNESIUM: CPT

## 2024-07-04 PROCEDURE — 96361 HYDRATE IV INFUSION ADD-ON: CPT

## 2024-07-04 PROCEDURE — 96374 THER/PROPH/DIAG INJ IV PUSH: CPT | Mod: 59

## 2024-07-04 PROCEDURE — 80053 COMPREHEN METABOLIC PANEL: CPT

## 2024-07-04 PROCEDURE — 99285 EMERGENCY DEPT VISIT HI MDM: CPT | Mod: 25

## 2024-07-04 PROCEDURE — 93010 ELECTROCARDIOGRAM REPORT: CPT | Mod: ,,, | Performed by: INTERNAL MEDICINE

## 2024-07-04 PROCEDURE — 80162 ASSAY OF DIGOXIN TOTAL: CPT

## 2024-07-04 PROCEDURE — 63600175 PHARM REV CODE 636 W HCPCS

## 2024-07-04 PROCEDURE — 84443 ASSAY THYROID STIM HORMONE: CPT

## 2024-07-04 PROCEDURE — 96365 THER/PROPH/DIAG IV INF INIT: CPT

## 2024-07-04 PROCEDURE — 25000003 PHARM REV CODE 250

## 2024-07-04 PROCEDURE — 84484 ASSAY OF TROPONIN QUANT: CPT

## 2024-07-04 PROCEDURE — 93005 ELECTROCARDIOGRAM TRACING: CPT

## 2024-07-04 PROCEDURE — 83880 ASSAY OF NATRIURETIC PEPTIDE: CPT

## 2024-07-04 RX ORDER — METOPROLOL TARTRATE 50 MG/1
50 TABLET ORAL
Status: COMPLETED | OUTPATIENT
Start: 2024-07-04 | End: 2024-07-04

## 2024-07-04 RX ORDER — POTASSIUM CHLORIDE 20 MEQ/1
40 TABLET, EXTENDED RELEASE ORAL
Status: COMPLETED | OUTPATIENT
Start: 2024-07-04 | End: 2024-07-04

## 2024-07-04 RX ORDER — PROCAINAMIDE HYDROCHLORIDE 100 MG/ML
1250 INJECTION INTRAMUSCULAR; INTRAVENOUS
Status: DISCONTINUED | OUTPATIENT
Start: 2024-07-04 | End: 2024-07-05

## 2024-07-04 RX ORDER — METOPROLOL TARTRATE 1 MG/ML
5 INJECTION, SOLUTION INTRAVENOUS
Status: COMPLETED | OUTPATIENT
Start: 2024-07-04 | End: 2024-07-04

## 2024-07-04 RX ORDER — LORAZEPAM 2 MG/ML
0.5 INJECTION INTRAMUSCULAR
Status: COMPLETED | OUTPATIENT
Start: 2024-07-04 | End: 2024-07-04

## 2024-07-04 RX ORDER — POTASSIUM CHLORIDE 7.45 MG/ML
10 INJECTION INTRAVENOUS
Status: COMPLETED | OUTPATIENT
Start: 2024-07-04 | End: 2024-07-05

## 2024-07-04 RX ORDER — SODIUM CHLORIDE 9 MG/ML
1000 INJECTION, SOLUTION INTRAVENOUS
Status: COMPLETED | OUTPATIENT
Start: 2024-07-04 | End: 2024-07-05

## 2024-07-04 RX ADMIN — METOPROLOL TARTRATE 5 MG: 1 INJECTION, SOLUTION INTRAVENOUS at 11:07

## 2024-07-04 RX ADMIN — POTASSIUM CHLORIDE 40 MEQ: 1500 TABLET, EXTENDED RELEASE ORAL at 11:07

## 2024-07-04 RX ADMIN — POTASSIUM CHLORIDE 10 MEQ: 7.46 INJECTION, SOLUTION INTRAVENOUS at 11:07

## 2024-07-04 RX ADMIN — LORAZEPAM 0.5 MG: 2 INJECTION INTRAMUSCULAR; INTRAVENOUS at 11:07

## 2024-07-04 RX ADMIN — METOPROLOL TARTRATE 50 MG: 50 TABLET, FILM COATED ORAL at 11:07

## 2024-07-04 RX ADMIN — SODIUM CHLORIDE 1000 ML: 9 INJECTION, SOLUTION INTRAVENOUS at 11:07

## 2024-07-05 PROBLEM — I48.91 ATRIAL FIBRILLATION WITH RVR: Status: ACTIVE | Noted: 2024-07-05

## 2024-07-05 PROBLEM — E87.6 HYPOKALEMIA: Status: ACTIVE | Noted: 2024-07-05

## 2024-07-05 PROBLEM — R07.9 CHEST PAIN: Status: ACTIVE | Noted: 2024-07-05

## 2024-07-05 PROBLEM — R79.89 ELEVATED TROPONIN: Status: ACTIVE | Noted: 2024-07-05

## 2024-07-05 LAB
ANION GAP SERPL CALC-SCNC: 5 MEQ/L (ref 2–13)
BUN SERPL-MCNC: 16 MG/DL (ref 7–20)
CALCIUM SERPL-MCNC: 9.9 MG/DL (ref 8.4–10.2)
CHLORIDE SERPL-SCNC: 105 MMOL/L (ref 98–110)
CO2 SERPL-SCNC: 31 MMOL/L (ref 21–32)
CREAT SERPL-MCNC: 0.99 MG/DL (ref 0.66–1.25)
CREAT/UREA NIT SERPL: 16 (ref 12–20)
DIGOXIN SERPL-MCNC: 0.7 NG/ML (ref 0.8–2)
GFR SERPLBLD CREATININE-BSD FMLA CKD-EPI: 58 ML/MIN/1.73/M2
GLUCOSE SERPL-MCNC: 113 MG/DL (ref 70–115)
POTASSIUM SERPL-SCNC: 3.7 MMOL/L (ref 3.5–5.1)
SODIUM SERPL-SCNC: 141 MMOL/L (ref 136–145)
TROPONIN I SERPL-MCNC: 0.21 NG/ML (ref 0–0.03)
TROPONIN I SERPL-MCNC: 0.38 NG/ML (ref 0–0.03)
TROPONIN I SERPL-MCNC: 0.54 NG/ML (ref 0–0.03)
TSH SERPL-ACNC: 1.79 UIU/ML (ref 0.36–3.74)

## 2024-07-05 PROCEDURE — 36415 COLL VENOUS BLD VENIPUNCTURE: CPT | Mod: 91

## 2024-07-05 PROCEDURE — 80048 BASIC METABOLIC PNL TOTAL CA: CPT

## 2024-07-05 PROCEDURE — 25000003 PHARM REV CODE 250: Performed by: FAMILY MEDICINE

## 2024-07-05 PROCEDURE — 84484 ASSAY OF TROPONIN QUANT: CPT

## 2024-07-05 PROCEDURE — 21400001 HC TELEMETRY ROOM

## 2024-07-05 PROCEDURE — 96366 THER/PROPH/DIAG IV INF ADDON: CPT

## 2024-07-05 PROCEDURE — 63600175 PHARM REV CODE 636 W HCPCS: Performed by: FAMILY MEDICINE

## 2024-07-05 PROCEDURE — 63600175 PHARM REV CODE 636 W HCPCS

## 2024-07-05 PROCEDURE — 94761 N-INVAS EAR/PLS OXIMETRY MLT: CPT

## 2024-07-05 PROCEDURE — 25000003 PHARM REV CODE 250

## 2024-07-05 RX ORDER — FENOFIBRATE 48 MG/1
48 TABLET, FILM COATED ORAL DAILY
Status: DISCONTINUED | OUTPATIENT
Start: 2024-07-05 | End: 2024-07-05

## 2024-07-05 RX ORDER — ACETAMINOPHEN 325 MG/1
650 TABLET ORAL EVERY 8 HOURS PRN
Status: DISCONTINUED | OUTPATIENT
Start: 2024-07-05 | End: 2024-07-06 | Stop reason: HOSPADM

## 2024-07-05 RX ORDER — DIGOXIN 125 MCG
125 TABLET ORAL DAILY
Status: DISCONTINUED | OUTPATIENT
Start: 2024-07-05 | End: 2024-07-06 | Stop reason: HOSPADM

## 2024-07-05 RX ORDER — CYANOCOBALAMIN 1000 UG/ML
1000 INJECTION, SOLUTION INTRAMUSCULAR; SUBCUTANEOUS
COMMUNITY
Start: 2024-05-23

## 2024-07-05 RX ORDER — FENOFIBRATE 48 MG/1
48 TABLET, FILM COATED ORAL DAILY
COMMUNITY

## 2024-07-05 RX ORDER — SODIUM CHLORIDE 0.9 % (FLUSH) 0.9 %
10 SYRINGE (ML) INJECTION
Status: DISCONTINUED | OUTPATIENT
Start: 2024-07-05 | End: 2024-07-06 | Stop reason: HOSPADM

## 2024-07-05 RX ORDER — ONDANSETRON HYDROCHLORIDE 2 MG/ML
4 INJECTION, SOLUTION INTRAVENOUS EVERY 8 HOURS PRN
Status: DISCONTINUED | OUTPATIENT
Start: 2024-07-05 | End: 2024-07-06 | Stop reason: HOSPADM

## 2024-07-05 RX ORDER — POTASSIUM CHLORIDE 20 MEQ/1
20 TABLET, EXTENDED RELEASE ORAL 2 TIMES DAILY
Status: DISCONTINUED | OUTPATIENT
Start: 2024-07-05 | End: 2024-07-06 | Stop reason: HOSPADM

## 2024-07-05 RX ORDER — SACUBITRIL AND VALSARTAN 49; 51 MG/1; MG/1
1 TABLET, FILM COATED ORAL 2 TIMES DAILY
COMMUNITY

## 2024-07-05 RX ORDER — POTASSIUM CHLORIDE 7.45 MG/ML
10 INJECTION INTRAVENOUS
Status: DISCONTINUED | OUTPATIENT
Start: 2024-07-05 | End: 2024-07-05

## 2024-07-05 RX ORDER — METOPROLOL SUCCINATE 25 MG/1
25 TABLET, EXTENDED RELEASE ORAL DAILY
Status: DISCONTINUED | OUTPATIENT
Start: 2024-07-05 | End: 2024-07-06 | Stop reason: HOSPADM

## 2024-07-05 RX ORDER — METOPROLOL SUCCINATE 25 MG/1
25 TABLET, EXTENDED RELEASE ORAL DAILY
Status: ON HOLD | COMMUNITY
End: 2024-07-06 | Stop reason: HOSPADM

## 2024-07-05 RX ORDER — ERGOCALCIFEROL 1.25 MG/1
50000 CAPSULE ORAL 2 TIMES DAILY
COMMUNITY
Start: 2024-05-24

## 2024-07-05 RX ORDER — FOLIC ACID 1 MG/1
1000 TABLET ORAL DAILY
COMMUNITY
Start: 2024-05-14

## 2024-07-05 RX ORDER — OLOPATADINE HYDROCHLORIDE 1 MG/ML
1 SOLUTION OPHTHALMIC 2 TIMES DAILY
COMMUNITY
Start: 2024-03-20

## 2024-07-05 RX ORDER — METOPROLOL TARTRATE 1 MG/ML
5 INJECTION, SOLUTION INTRAVENOUS
Status: DISCONTINUED | OUTPATIENT
Start: 2024-07-05 | End: 2024-07-06 | Stop reason: HOSPADM

## 2024-07-05 RX ORDER — ROSUVASTATIN CALCIUM 5 MG/1
5 TABLET, COATED ORAL NIGHTLY
COMMUNITY
Start: 2024-05-28

## 2024-07-05 RX ORDER — FENOFIBRATE 48 MG/1
48 TABLET, FILM COATED ORAL DAILY
Status: DISCONTINUED | OUTPATIENT
Start: 2024-07-05 | End: 2024-07-06 | Stop reason: HOSPADM

## 2024-07-05 RX ORDER — POTASSIUM CHLORIDE 20 MEQ/1
20 TABLET, EXTENDED RELEASE ORAL 2 TIMES DAILY
Status: DISCONTINUED | OUTPATIENT
Start: 2024-07-05 | End: 2024-07-05

## 2024-07-05 RX ORDER — POTASSIUM CHLORIDE 20 MEQ/1
20 TABLET, EXTENDED RELEASE ORAL DAILY
Status: DISCONTINUED | OUTPATIENT
Start: 2024-07-05 | End: 2024-07-05

## 2024-07-05 RX ORDER — METOPROLOL TARTRATE 25 MG/1
25 TABLET, FILM COATED ORAL DAILY
Status: ON HOLD | COMMUNITY
Start: 2024-05-07 | End: 2024-07-06 | Stop reason: HOSPADM

## 2024-07-05 RX ORDER — TOBRAMYCIN 3 MG/ML
1 SOLUTION/ DROPS OPHTHALMIC ONCE
COMMUNITY
Start: 2024-02-19

## 2024-07-05 RX ORDER — POTASSIUM CHLORIDE 750 MG/1
10 TABLET, EXTENDED RELEASE ORAL 3 TIMES DAILY
Status: DISCONTINUED | OUTPATIENT
Start: 2024-07-05 | End: 2024-07-05

## 2024-07-05 RX ADMIN — POTASSIUM CHLORIDE 20 MEQ: 1500 TABLET, EXTENDED RELEASE ORAL at 08:07

## 2024-07-05 RX ADMIN — DIGOXIN 125 MCG: 125 TABLET ORAL at 08:07

## 2024-07-05 RX ADMIN — METOPROLOL TARTRATE 5 MG: 1 INJECTION, SOLUTION INTRAVENOUS at 08:07

## 2024-07-05 RX ADMIN — POTASSIUM CHLORIDE 10 MEQ: 7.46 INJECTION, SOLUTION INTRAVENOUS at 10:07

## 2024-07-05 RX ADMIN — SACUBITRIL AND VALSARTAN 1 TABLET: 49; 51 TABLET, FILM COATED ORAL at 08:07

## 2024-07-05 RX ADMIN — FENOFIBRATE 48 MG: 48 TABLET ORAL at 09:07

## 2024-07-05 RX ADMIN — POTASSIUM CHLORIDE 10 MEQ: 7.46 INJECTION, SOLUTION INTRAVENOUS at 09:07

## 2024-07-05 RX ADMIN — ONDANSETRON 4 MG: 2 INJECTION INTRAMUSCULAR; INTRAVENOUS at 08:07

## 2024-07-05 RX ADMIN — POTASSIUM CHLORIDE 10 MEQ: 7.46 INJECTION, SOLUTION INTRAVENOUS at 11:07

## 2024-07-05 RX ADMIN — RIVAROXABAN 20 MG: 20 TABLET, FILM COATED ORAL at 08:07

## 2024-07-05 RX ADMIN — POTASSIUM CHLORIDE 10 MEQ: 750 TABLET, EXTENDED RELEASE ORAL at 08:07

## 2024-07-05 RX ADMIN — METOPROLOL SUCCINATE 25 MG: 25 TABLET, EXTENDED RELEASE ORAL at 08:07

## 2024-07-05 NOTE — PLAN OF CARE
Problem: Adult Inpatient Plan of Care  Goal: Plan of Care Review  7/5/2024 0454 by Adriana Ramos RN  Outcome: Progressing  7/5/2024 0453 by Adriana Ramos RN  Outcome: Progressing  Goal: Patient-Specific Goal (Individualized)  7/5/2024 0454 by Adriana Ramos RN  Outcome: Progressing  7/5/2024 0453 by Adriana Ramos RN  Outcome: Progressing  Goal: Absence of Hospital-Acquired Illness or Injury  7/5/2024 0454 by Adriana Ramos RN  Outcome: Progressing  7/5/2024 0453 by Adriana Ramos RN  Outcome: Progressing  Goal: Optimal Comfort and Wellbeing  7/5/2024 0454 by Adriana Ramos RN  Outcome: Progressing  7/5/2024 0453 by Adriana Ramos RN  Outcome: Progressing  Goal: Readiness for Transition of Care  7/5/2024 0454 by Adriana Ramos RN  Outcome: Progressing  7/5/2024 0453 by Adriana Ramos RN  Outcome: Progressing     Problem: Fall Injury Risk  Goal: Absence of Fall and Fall-Related Injury  Outcome: Progressing     Problem: Comorbidity Management  Goal: Maintenance of Heart Failure Symptom Control  Outcome: Progressing  Goal: Blood Pressure in Desired Range  Outcome: Progressing  Goal: Bariatric Home Regimen Maintained  Outcome: Progressing

## 2024-07-05 NOTE — H&P
[facility]    History & Physical      Patient Name: Patsy H Cantu  MRN: [id number]  Admission Date: 7/4/2024  Attending Physician:  Mateo Jackson MD  Primary Care Provider: Mery Sanon NP         Patient information was obtained from patient and ER records.     Subjective:     Principal Problem:Atrial fibrillation with RVR    Chief Complaint:   Chief Complaint   Patient presents with    Palpitations     PALPITATIONS. ONSET 1800. DENIES DISCOMFORT        HPI: Ms Cantu presented to the emergency room with a complaint of palpitations. She denies chest pain. She is followed by cardiology for paroxysmal atrial fibrillation. She had medications changed a couple months ago and medication titrated last week ago. She is on Xarelto. She is also on Entresto. She denies any cough or difficulty breathing. During exam her heart rate was noted to range between 60s and 120s. During this time she did not complaint any discomfort.  Cardiology was consulted from ER      Past Medical History:   Diagnosis Date    Fluid retention     Hypercholesterolemia     Hypertension     Pacemaker     Paroxysmal atrial fibrillation        Past Surgical History:   Procedure Laterality Date    HYSTERECTOMY      JOINT REPLACEMENT Right     X2    SHOULDER ARTHROSCOPY Left 8/4/2023    Procedure: ARTHROSCOPY, SHOULDER;  Surgeon: Albert Sood MD;  Location: [facility];  Service: Orthopedics;  Laterality: Left;       Review of patient's allergies indicates:  No Known Allergies    No current facility-administered medications on file prior to encounter.     Current Outpatient Medications on File Prior to Encounter   Medication Sig    digoxin (LANOXIN) 125 mcg tablet Take 125 mcg by mouth once daily.    fenofibrate (TRICOR) 48 MG tablet Take 48 mg by mouth once daily.    furosemide (LASIX) 40 MG tablet Take 40 mg by mouth once daily.    metoprolol succinate (TOPROL-XL) 25 MG 24 hr tablet Take 25 mg by mouth once daily.     potassium chloride (KLOR-CON) 10 MEQ TbSR 1 tablet once daily.    sacubitriL-valsartan (ENTRESTO) 49-51 mg per tablet Take 1 tablet by mouth 2 (two) times daily.    VASCEPA 1 gram Cap Take 2 capsules by mouth nightly.    acetaminophen (TYLENOL) 500 MG tablet Take 1,000 mg by mouth nightly.    benazepriL (LOTENSIN) 20 MG tablet Take 20 mg by mouth 2 (two) times daily.    cyanocobalamin 1,000 mcg/mL injection Inject 1,000 mcg into the muscle every 30 days.    ergocalciferol (ERGOCALCIFEROL) 50,000 unit Cap Take 50,000 Units by mouth 2 (two) times daily.    EScitalopram oxalate (LEXAPRO) 10 MG tablet     folic acid (FOLVITE) 1 MG tablet Take 1,000 mcg by mouth once daily.    hydrALAZINE (APRESOLINE) 50 MG tablet Take 50 mg by mouth 2 (two) times a day.    HYDROcodone-acetaminophen (NORCO) 7.5-325 mg per tablet Take 1 tablet by mouth every 6 (six) hours as needed for Pain.    metoprolol succinate (TOPROL-XL) 25 MG 24 hr tablet Take 1 tablet (25 mg total) by mouth once daily.    metoprolol tartrate (LOPRESSOR) 25 MG tablet Take 25 mg by mouth Daily.    PATADAY TWICE DAILY RELIEF 0.1 % ophthalmic solution Place 1 drop into both eyes 2 (two) times daily.    rosuvastatin (CRESTOR) 5 MG tablet Take 5 mg by mouth every evening.    sotaloL (BETAPACE) 240 MG Tab Take 240 mg by mouth 2 (two) times daily.    tobramycin sulfate 0.3% (TOBREX) 0.3 % ophthalmic solution Place 1 drop into both eyes once.    XARELTO 20 mg Tab Take 20 mg by mouth nightly.     Family History    None       Tobacco Use    Smoking status: Never    Smokeless tobacco: Never   Substance and Sexual Activity    Alcohol use: Never    Drug use: Never    Sexual activity: Not Currently     Review of Systems   Constitutional:  Negative for chills and fever.   HENT:  Negative for sinus pain and sore throat.    Eyes:  Negative for photophobia and pain.   Respiratory:  Negative for chest tightness and shortness of breath.    Cardiovascular:  Positive for palpitations.  Negative for chest pain.   Gastrointestinal:  Negative for abdominal pain, nausea and vomiting.   Endocrine: Negative for cold intolerance and heat intolerance.   Genitourinary:  Negative for dysuria and flank pain.   Musculoskeletal:  Negative for gait problem and myalgias.   Skin:  Negative for pallor and rash.   Allergic/Immunologic: Negative for environmental allergies and food allergies.   Neurological:  Negative for seizures and syncope.   Hematological:  Does not bruise/bleed easily.   Psychiatric/Behavioral:  Negative for agitation and confusion.      Objective:     Vital Signs (Most Recent):  Temp: 98.1 °F (36.7 °C) (07/04/24 2258)  Pulse: (!) 59 (07/05/24 0228)  Resp: (!) 23 (07/05/24 0228)  BP: 112/77 (07/05/24 0228)  SpO2: 95 % (07/05/24 0228) Vital Signs (24h Range):  Temp:  [98.1 °F (36.7 °C)] 98.1 °F (36.7 °C)  Pulse:  [] 59  Resp:  [4-28] 23  SpO2:  [94 %-96 %] 95 %  BP: (103-144)/(66-93) 112/77     Weight: 83 kg (183 lb)  Body mass index is 31.41 kg/m².    Physical Exam  Constitutional:       General: She is not in acute distress.     Appearance: Normal appearance.   HENT:      Head: Normocephalic and atraumatic.   Eyes:      General: No scleral icterus.     Extraocular Movements: Extraocular movements intact.   Cardiovascular:      Rate and Rhythm: Normal rate. Rhythm irregular.   Pulmonary:      Effort: Pulmonary effort is normal. No respiratory distress.   Abdominal:      General: There is no distension.      Palpations: Abdomen is soft.   Musculoskeletal:         General: No swelling. Normal range of motion.      Cervical back: Normal range of motion and neck supple.   Skin:     General: Skin is dry.      Coloration: Skin is not jaundiced.   Neurological:      General: No focal deficit present.      Mental Status: She is alert and oriented to person, place, and time.   Psychiatric:         Mood and Affect: Mood normal.         Behavior: Behavior normal.            Significant Labs: All  pertinent labs within the past 24 hours have been reviewed.  Recent Lab Results         07/05/24  0107   07/04/24  2306        Albumin/Globulin Ratio   1.4       Albumin   4.2       ALP   98       ALT   19       Anion Gap   5.0       AST   27       Baso #   0.04       Basophil %   0.5       BILIRUBIN TOTAL   0.8       BUN   13       BUN/CREAT RATIO   13       Calcium   9.7       Chloride   104       CO2   31       Creatinine   1.00       eGFR   57  Comment:                      EGFR INTERPRETATION    Beginning 8/15/22 we are reporting the eGFRcr calculation as recommended by the National Kid[employer]. The eGFRcr equation has similar overall performance characteristics to the older equation, but the values may differ by more than 10% particularly at higher values of eGFRcr and younger adult ages.    NKF stages of chronic kidney disease (CKD)  Stage 1: Kidney damage with normal or increased eGFR (>90 mL/min/1.73 m^2)  Stage 2: Mild reduction in GFR (60-89 mL/min/1.73 m^2)  Stage 3a: Moderate reduction in GFR (45-59 mL/min/1.73 m^2)  Stage 3b: Moderate reduction in GFR (30-44 mL/min/1.73 m^2)  Stage 4: Severe reduction in GFR (15-29 mL/min/1.73 m^2)  Stage 5: Kidney failure (GFR <15 mL/min/1.73 m^2)           Eos #   0.21       Eos %   2.4       Globulin, Total   3.1       Glucose   119       Hematocrit   42.1       Hemoglobin   14.1       Immature Grans (Abs)   0.03       Immature Granulocytes   0.3       Lymph #   2.17       LYMPH %   25.1       Magnesium    2.00       MCH   28.2       MCHC   33.5       MCV   84.2       Mono #   0.60       Mono %   7.0       MPV   10.0       Neut #   5.58       Neut %   64.7       nRBC   0.0       Platelet Count   196       Potassium   3.2       ProBNP   421.0  Comment:   For ambulatory patients presenting to outpatient facilities with clinical suspicion of HF not previously diagnosed and at least one sign, symptom or risk factor for HF, NT-proBNP II test results should be  "interpreted as indicated below:    <125(pg/mL):"Negative: Heart Failure Unlikely"    >=125(pg/mL):"Consider Heart Failure as well as other causes of NT-proBNP elevation"             PROTEIN TOTAL   7.3       RBC   5.00       RDW   14.8       Sodium   140       Troponin I 0.209   0.130       TSH   1.790       WBC   8.63               Significant Imaging: I have reviewed all pertinent imaging results/findings within the past 24 hours.    Assessment/Plan:     1 - Atrial fibrillation with RVR: cardiology consulted, telemetry, continue current medical management plus PRN metoprolol IV    2 - demand ischemia: trend troponin, troponin elevation likely due to atrial fibrillation with RVR    3 - hypertension: continue current medical management      Active Diagnoses:    Diagnosis Date Noted POA    PRINCIPAL PROBLEM:  Atrial fibrillation with RVR [I48.91] 07/05/2024 Unknown    Chest pain [R07.9] 07/05/2024 Unknown    Elevated troponin [R79.89] 07/05/2024 Unknown    Hypokalemia [E87.6] 07/05/2024 Unknown      Problems Resolved During this Admission:     VTE Risk Mitigation (From admission, onward)           Ordered     IP VTE HIGH RISK PATIENT  Once         07/05/24 0231     Reason for No Pharmacological VTE Prophylaxis  Once        Question:  Reasons:  Answer:  Already adequately anticoagulated on oral Anticoagulants    07/05/24 0231                  Time spent 7/5/24 is 50 minutes    Full code    Plan discussed with patient, she is her own decision-maker and does understand and concur with plan    Home safety screen performed, no concerns voiced    Patient placed in observation status with cardiology consult    This visit is via telemedicine from [facility] [place]  patient is located in [place], [place]    Mateo Jackson JR, MD  Department of Hospital Medicine   [facility]    "

## 2024-07-05 NOTE — PLAN OF CARE
07/05/24 0931   Discharge Assessment   Assessment Type Discharge Planning Assessment   Confirmed/corrected address, phone number and insurance Yes   Confirmed Demographics Correct on Facesheet   Source of Information patient;health record   When was your last doctors appointment?   (about a month ago with Cardiologist)   Communicated DOLLY with patient/caregiver Date not available/Unable to determine   Reason For Admission A Fib with RVR   People in Home alone   Facility Arrived From: Home   Do you expect to return to your current living situation? Yes   Do you have help at home or someone to help you manage your care at home? Yes   Who are your caregiver(s) and their phone number(s)? Children assists   Prior to hospitilization cognitive status: Alert/Oriented   Current cognitive status: Alert/Oriented   Walking or Climbing Stairs Difficulty yes   Walking or Climbing Stairs ambulation difficulty, requires equipment   Mobility Management Rollator   Dressing/Bathing Difficulty no   Equipment Currently Used at Home rollator;bath bench   Readmission within 30 days? No   Patient currently being followed by outpatient case management? No   Do you currently have service(s) that help you manage your care at home? Yes   Name and Contact number of agency Unsure of name   Is the pt/caregiver preference to resume services with current agency Yes   Do you take prescription medications? Yes   Do you have prescription coverage? Yes   Coverage [employer] Managed MCR   Do you have any problems affording any of your prescribed medications? No   Is the patient taking medications as prescribed? yes   Who is going to help you get home at discharge? Friend   How do you get to doctors appointments? family or friend will provide   Are you on dialysis? No   Do you take coumadin? No   Discharge Plan A Home Health   Discharge Plan B New Nursing Home placement - long-term care facility   DME Needed Upon Discharge  none   Discharge Plan discussed  with: Patient   Transition of Care Barriers None   Transportation Needs   Has the lack of transportation kept you from medical appointments, meetings, work or from getting things needed for daily living? No   Social Isolation   How often do you feel lonely or isolated from those around you?  Often   Health Literacy   How often do you need to have someone help you when you read instructions, pamphlets, or other written material from your doctor or pharmacy? Often  (Daughter assists)

## 2024-07-05 NOTE — PROGRESS NOTES
Hospital Medicine  Progress Note    Patient Name: Patsy H Cantu  MRN: [id number]  Status: IP- Inpatient   Admission Date: 7/4/2024  Length of Stay: 0  Date of Service: 07/05/2024       CC: hospital follow-up for        SUBJECTIVE     Ms Cantu presented to the emergency room with a complaint of palpitations. She denies chest pain. She is followed by cardiology for paroxysmal atrial fibrillation. She had medications changed a couple months ago and medication titrated last week ago. She is on Xarelto. She is also on Entresto. She denies any cough or difficulty breathing. During exam her heart rate was noted to range between 60s and 120s. During this time she did not complaint any discomfort.  Cardiology was consulted from ER.    07/05/2024  No chest pain  HR better controlled this AM  Troponin up to 0.535 from 0.130, no chest pain    Today: Patient seen and examined at bedside, and chart reviewed.       MEDICATIONS   Scheduled   digoxin  125 mcg Oral Daily    fenofibrate  48 mg Oral Daily    metoprolol succinate  25 mg Oral Daily    potassium chloride SA  20 mEq Oral BID    rivaroxaban  20 mg Oral Nightly    sacubitriL-valsartan  1 tablet Oral BID     Continuous Infusions        PHYSICAL EXAM   VITALS: T 98.8 °F (37.1 °C)   /80   P 82   RR 18   O2 97 %    GENERAL: Awake and in NAD  LUNGS: CTA B/L  CVS: Normal rate  GI/: Soft, NT, bowel sounds positive.  EXTREMITIES: No peripheral edema  NEURO: AAOx3  PSYCH: Cooperative      LABS   CBC  Recent Labs     07/04/24  2306   WBC 8.63   RBC 5.00   HGB 14.1   HCT 42.1   MCV 84.2   MCH 28.2   MCHC 33.5   RDW 14.8*        CHEM  Recent Labs     07/04/24  2306 07/05/24  0751    141   K 3.2* 3.7   CO2 31 31   BUN 13 16   CREATININE 1.00 0.99   GLUCOSE 119* 113   CALCIUM 9.7 9.9   MG 2.00  --    ALBUMIN 4.2  --    GLOBULIN 3.1  --    ALKPHOS 98  --    ALT 19  --    AST 27  --    BILITOT 0.8  --    TSH 1.790  --          MICROBIOLOGY     Microbiology Results (last 7  days)       ** No results found for the last 168 hours. **              DIAGNOSTICS   X-Ray Chest AP Portable  Narrative: EXAMINATION:  STUDY: XR CHEST AP PORTABLE    CLINICAL HISTORY AND TECHNIQUE:  Congestion, shortness of breath    COMPARISON:  05/04/2024    FINDINGS:  A multilead pacemaker overlies left hemithorax with leads appearing well position.The heart is mildly to moderately enlarged with mild vascular congestion is slightly increased interstitial lung markings which are exaggerated by the poor inspiratory effort.I see no lobar or segmental infiltrates.No significant pleural effusions are noted.There is moderate demineralization of the skeletal structures with mild degenerative changes noted throughout the thoracic spine.  Impression: 1. The heart is mildly to moderately enlarged with vascular congestion and slightly increased interstitial lung markings.    Electronically signed by: Jo Pollack  Date:    05/06/2024  Time:    04:33        ASSESSMENT/PLAN:   1 - Atrial fibrillation with RVR: cardiology consulted, telemetry, continue current medical management plus PRN metoprolol IV  -cont digoxin/metoprolol     2 - NSTEMI Type II  demand ischemia: trend troponin, troponin elevation likely due to atrial fibrillation with RVR  Troponin trend down     3 - hypertension: continue current medical management    4- hypomagnesium  -replace    5- hypokalemia  -replace              Ernie Kim MD  [facility]

## 2024-07-05 NOTE — ED PROVIDER NOTES
Encounter Date: 7/4/2024       History     Chief Complaint   Patient presents with    Palpitations     PALPITATIONS. ONSET 1800. DENIES DISCOMFORT     79-year-old female arrives via EMS with complaints of palpitations and rapid heartbeat.  These symptoms started a couple of hours ago.  She has a history episodic AFib.  She is on metoprolol and digoxin for rate control, Entresto for CHF, and Xarelto for clot prevention.  Additionally, she has a pacemaker.  She has been eating and drinking normally.  There have been no recent changes in her medications.    The history is provided by the patient.     Review of patient's allergies indicates:  No Known Allergies  Past Medical History:   Diagnosis Date    Fluid retention     Hypercholesterolemia     Hypertension     Pacemaker     Paroxysmal atrial fibrillation      Past Surgical History:   Procedure Laterality Date    HYSTERECTOMY      JOINT REPLACEMENT Right     X2    SHOULDER ARTHROSCOPY Left 8/4/2023    Procedure: ARTHROSCOPY, SHOULDER;  Surgeon: Albert Sood MD;  Location: [facility];  Service: Orthopedics;  Laterality: Left;     No family history on file.  Social History     Tobacco Use    Smoking status: Never    Smokeless tobacco: Never   Substance Use Topics    Alcohol use: Never    Drug use: Never     Review of Systems   Constitutional:  Negative for fever.   HENT:  Negative for sore throat.    Respiratory:  Negative for shortness of breath.    Cardiovascular:  Positive for palpitations. Negative for chest pain.   Gastrointestinal:  Negative for nausea.   Genitourinary:  Negative for dysuria.   Musculoskeletal:  Negative for back pain.   Skin:  Negative for rash.   Neurological:  Negative for weakness.   Hematological:  Does not bruise/bleed easily.   All other systems reviewed and are negative.      Physical Exam     Initial Vitals   BP Pulse Resp Temp SpO2   07/04/24 2302 07/04/24 2258 07/04/24 2258 07/04/24 2258 07/04/24 2258   (!) 110/93 (!) 156 20 98.1  °F (36.7 °C) 96 %      MAP       --                Physical Exam    Nursing note and vitals reviewed.  Constitutional: Vital signs are normal. She appears well-developed and well-nourished. She is cooperative.   She is pleasant, cooperative and freely conversant.   HENT:   Head: Normocephalic and atraumatic.   Her mucous membranes are tacky   Eyes: Conjunctivae, EOM and lids are normal. Pupils are equal, round, and reactive to light.   Neck: Trachea normal. Neck supple.   Normal range of motion.  Cardiovascular:  Normal heart sounds and intact distal pulses.           There is a rapid rate with a regular rhythm   Pulmonary/Chest: Breath sounds normal.   Abdominal: Abdomen is soft. Bowel sounds are normal.   Musculoskeletal:         General: Normal range of motion.      Cervical back: Normal, normal range of motion and neck supple.      Lumbar back: Normal.     Neurological: She is alert and oriented to person, place, and time. She has normal strength. Coordination normal. GCS score is 15. GCS eye subscore is 4. GCS verbal subscore is 5. GCS motor subscore is 6.   Skin: Skin is warm, dry and intact. Capillary refill takes less than 2 seconds.   Psychiatric: She has a normal mood and affect. Her speech is normal and behavior is normal. Judgment and thought content normal. Cognition and memory are normal.         ED Course   Critical Care    Date/Time: 7/4/2024 10:55 PM    Performed by: Ron Quijano MD  Authorized by: Ron Quijano MD  Direct patient critical care time: 20 minutes  Additional history critical care time: 10 minutes  Ordering / reviewing critical care time: 15 minutes  Documentation critical care time: 10 minutes  Total critical care time (exclusive of procedural time) : 55 minutes  Critical care time was exclusive of separately billable procedures and treating other patients and teaching time.  Critical care was necessary to treat or prevent imminent or life-threatening deterioration of the  following conditions: AFib with RVR, cardiac arrhythmia.  Critical care was time spent personally by me on the following activities: evaluation of patient's response to treatment, examination of patient, obtaining history from patient or surrogate, ordering and performing treatments and interventions, ordering and review of laboratory studies, ordering and review of radiographic studies, pulse oximetry, re-evaluation of patient's condition and review of old charts.  Subsequent provider of critical care: I assumed direction of critical care for this patient from another provider of my specialty.        Labs Reviewed   COMPREHENSIVE METABOLIC PANEL - Abnormal; Notable for the following components:       Result Value    Potassium 3.2 (*)     Glucose 119 (*)     All other components within normal limits   TROPONIN I - Abnormal; Notable for the following components:    Troponin-I 0.130 (*)     All other components within normal limits   NT-PRO NATRIURETIC PEPTIDE - Abnormal; Notable for the following components:    ProBNP 421.0 (*)     All other components within normal limits   CBC WITH DIFFERENTIAL - Abnormal; Notable for the following components:    RDW 14.8 (*)     Mono # 0.60 (*)     All other components within normal limits   TROPONIN I - Abnormal; Notable for the following components:    Troponin-I 0.209 (*)     All other components within normal limits   MAGNESIUM - Normal   TSH - Normal   CBC W/ AUTO DIFFERENTIAL    Narrative:     The following orders were created for panel order CBC auto differential.  Procedure                               Abnormality         Status                     ---------                               -----------         ------                     CBC with Differential[[id number]]       Abnormal            Final result                 Please view results for these tests on the individual orders.   DIGOXIN LEVEL        ECG Results              EKG 12-lead (Preliminary result)  Result  time 07/04/24 23:09:06      Wet Read by Ron Quijano MD (07/04/24 23:09:06, [facility], Emergency Medicine)    Atrial fibrillation with rapid ventricular response, heart rate 142, patient was a left axis deviation any left bundle-branch block with diffuse ST and T-wave changes.                                  Imaging Results    None          Medications   sodium chloride 0.9% flush 10 mL (has no administration in time range)   acetaminophen tablet 650 mg (has no administration in time range)   ondansetron injection 4 mg (has no administration in time range)   metoprolol injection 5 mg (has no administration in time range)   metoprolol injection 5 mg (5 mg Intravenous Given 7/4/24 2303)   metoprolol tartrate (LOPRESSOR) tablet 50 mg (50 mg Oral Given 7/4/24 2303)   0.9%  NaCl infusion (0 mLs Intravenous Stopped 7/5/24 0024)   LORazepam injection 0.5 mg (0.5 mg Intravenous Given 7/4/24 2321)   potassium chloride SA CR tablet 40 mEq (40 mEq Oral Given 7/4/24 2340)   potassium chloride 10 mEq in 100 mL IVPB (0 mEq Intravenous Stopped 7/5/24 0042)     Medical Decision Making  Episodic AFib, now with RVR  Differential diagnosis:  Dehydration, electrolyte imbalance, occult MI  Metoprolol, Ativan, gentle IV fluid hydration  Cardiac workup, magnesium, digoxin level  (Digoxin level is a send out, is not available at this facility)    Amount and/or Complexity of Data Reviewed  Labs: ordered.  Discussion of management or test interpretation with external provider(s): Patient's heart rate is running between 89 and 115, after the metoprolol.  Workup shows hypokalemia.  We will replace her potassium.  There is no cardiology available tomorrow in [place].  Patient does not want electrical cardioversion.  We will try procainamide.  She has a pacemaker, so the chance of bradycardia is extremely unlikely.  Procainamide is unavailable in [place].  Patient has been resting, and her heart rate has  been down in the 50s and 60s.  She was still in AFib.  When she wakes up, she says she is feeling well.  However, her heart rate goes back up over 100.  She has never had chest pain at any point.  [facility] tele cardiology recommends keeping patient in [place], giving IV metoprolol p.r.n. for elevated heart rate.  Tele cardiology we will be available for consult for any problems.  They feel the elevated troponin is strictly due to the AFib with RVR.    Risk  Prescription drug management.                                      Clinical Impression:  Final diagnoses:  [R07.9] Chest pain  [I48.91] Atrial fibrillation with RVR (Primary)  [R79.89] Elevated troponin  [E87.6] Hypokalemia          ED Disposition Condition    Observation Stable                Ron Quijano MD  07/05/24 9252

## 2024-07-05 NOTE — CONSULTS
[facility]  [facility]ult Note    Patient Name: Patsy H Cantu  MRN: [id number]  Admission Date: 7/4/2024  Hospital Length of Stay: 0 days  Code Status: Prior   Attending Provider:  Dr. Ron Quijano  Consulting Provider: Milvia Worley NP  Primary Care Physician: Mery Sanon NP  Principal Problem:<principal problem not specified>    Patient information was obtained from patient, past medical records, and ER records.   Duration: >30 minutes including review of present and past medical records, discussion with physcian, interview and exam of patient    Inpatient consult to Telemedicine-Card  Consult performed by: Milvia Worley NP  Consult ordered by: Ron Quijaon MD        Subjective:     Chief Complaint:  palpitations     HPI: 78 yo female who was seen by [facility] in May 2024 as a telecardiology consult but follows with Dr. Mckeon in [place], [place] with history of PAF, HTN, HLD, PPM, LV dysfunction presented with complaints of palpitations and rapid heartbeat.  These symptoms started a couple of hours ago. She is on metoprolol and digoxin for rate control, Entresto for CHF, and Xarelto for clot prevention.  Additionally, she has a pacemaker.  She has been eating and drinking normally. She denies CP, SOB, N/V, presyncope or TIA type symptoms. She had a similar episode in May, 2024. Since then she was seen by her cardiologist and her sotalol was discontinued and entresto was started. Work up in ER reveals afib RVR with improved rate after IV lopressor, mildly elevated troponin and hypokalemia. The patient denies any history of CAD. [facility] consulted by Dr. Quijano for afib RVR, elevated troponin.       Past Medical History:   Diagnosis Date    Fluid retention     Hypercholesterolemia     Hypertension     Pacemaker     Paroxysmal atrial fibrillation    LV dysfunction    Past Surgical History:   Procedure Laterality Date    HYSTERECTOMY      JOINT REPLACEMENT Right      X2    SHOULDER ARTHROSCOPY Left 8/4/2023    Procedure: ARTHROSCOPY, SHOULDER;  Surgeon: Albert Sood MD;  Location: [facility];  Service: Orthopedics;  Laterality: Left;       Review of patient's allergies indicates:  No Known Allergies    No current facility-administered medications on file prior to encounter.     Current Outpatient Medications on File Prior to Encounter   Medication Sig    acetaminophen (TYLENOL) 500 MG tablet Take 1,000 mg by mouth nightly.    benazepriL (LOTENSIN) 20 MG tablet Take 20 mg by mouth 2 (two) times daily.    cyanocobalamin 1,000 mcg/mL injection Inject 1,000 mcg into the muscle every 30 days.    digoxin (LANOXIN) 125 mcg tablet Take 125 mcg by mouth once daily.    ergocalciferol (ERGOCALCIFEROL) 50,000 unit Cap Take 50,000 Units by mouth 2 (two) times daily.    EScitalopram oxalate (LEXAPRO) 10 MG tablet     folic acid (FOLVITE) 1 MG tablet Take 1,000 mcg by mouth once daily.    furosemide (LASIX) 40 MG tablet Take 40 mg by mouth once daily.    hydrALAZINE (APRESOLINE) 50 MG tablet Take 50 mg by mouth 2 (two) times a day.    HYDROcodone-acetaminophen (NORCO) 7.5-325 mg per tablet Take 1 tablet by mouth every 6 (six) hours as needed for Pain.    metoprolol succinate (TOPROL-XL) 25 MG 24 hr tablet Take 1 tablet (25 mg total) by mouth once daily.    metoprolol tartrate (LOPRESSOR) 25 MG tablet Take 25 mg by mouth Daily.    PATADAY TWICE DAILY RELIEF 0.1 % ophthalmic solution Place 1 drop into both eyes 2 (two) times daily.    potassium chloride (KLOR-CON) 10 MEQ TbSR 1 tablet once daily.    rosuvastatin (CRESTOR) 5 MG tablet Take 5 mg by mouth every evening.    sotaloL (BETAPACE) 240 MG Tab Take 240 mg by mouth 2 (two) times daily.    tobramycin sulfate 0.3% (TOBREX) 0.3 % ophthalmic solution Place 1 drop into both eyes once.    VASCEPA 1 gram Cap Take 1 capsule by mouth 2 (two) times a day.    XARELTO 20 mg Tab Take 20 mg by mouth nightly.     Family History    None       Tobacco  Use    Smoking status: Never    Smokeless tobacco: Never   Substance and Sexual Activity    Alcohol use: Never    Drug use: Never    Sexual activity: Not Currently     Review of Systems   Constitutional: Negative.   HENT: Negative.     Eyes: Negative.    Cardiovascular:  Positive for palpitations (rapid HR).   Respiratory: Negative.     Hematologic/Lymphatic: Negative.    Skin: Negative.    Musculoskeletal: Negative.    Gastrointestinal: Negative.    Genitourinary: Negative.    Neurological: Negative.    Psychiatric/Behavioral: Negative.       Objective:     Vital Signs (Most Recent):  Temp: 98.1 °F (36.7 °C) (07/04/24 2258)  Pulse: (!) 59 (07/05/24 0128)  Resp: (!) 4 (07/05/24 0128)  BP: (!) 144/66 (07/05/24 0128)  SpO2: 95 % (07/05/24 0128) Vital Signs (24h Range):  Temp:  [98.1 °F (36.7 °C)] 98.1 °F (36.7 °C)  Pulse:  [] 59  Resp:  [4-28] 4  SpO2:  [94 %-96 %] 95 %  BP: (103-144)/(66-93) 144/66     Weight: 83 kg (183 lb)  Body mass index is 31.41 kg/m².    SpO2: 95 %       No intake or output data in the 24 hours ending 07/05/24 0212    Lines/Drains/Airways       Peripheral Intravenous Line  Duration                  Peripheral IV - Single Lumen 07/04/24 2230 20 G Anterior;Proximal;Right Forearm <1 day                        Significant Labs:   Recent Lab Results         07/05/24  0107   07/04/24  2306        Albumin/Globulin Ratio   1.4       Albumin   4.2       ALP   98       ALT   19       Anion Gap   5.0       AST   27       Baso #   0.04       Basophil %   0.5       BILIRUBIN TOTAL   0.8       BUN   13       BUN/CREAT RATIO   13       Calcium   9.7       Chloride   104       CO2   31       Creatinine   1.00       eGFR   57  Comment:                      EGFR INTERPRETATION    Beginning 8/15/22 we are reporting the eGFRcr calculation as recommended by the [employer]. The eGFRcr equation has similar overall performance characteristics to the older equation, but the values may differ by  "more than 10% particularly at higher values of eGFRcr and younger adult ages.    NKF stages of chronic kidney disease (CKD)  Stage 1: Kidney damage with normal or increased eGFR (>90 mL/min/1.73 m^2)  Stage 2: Mild reduction in GFR (60-89 mL/min/1.73 m^2)  Stage 3a: Moderate reduction in GFR (45-59 mL/min/1.73 m^2)  Stage 3b: Moderate reduction in GFR (30-44 mL/min/1.73 m^2)  Stage 4: Severe reduction in GFR (15-29 mL/min/1.73 m^2)  Stage 5: Kidney failure (GFR <15 mL/min/1.73 m^2)           Eos #   0.21       Eos %   2.4       Globulin, Total   3.1       Glucose   119       Hematocrit   42.1       Hemoglobin   14.1       Immature Grans (Abs)   0.03       Immature Granulocytes   0.3       Lymph #   2.17       LYMPH %   25.1       Magnesium    2.00       MCH   28.2       MCHC   33.5       MCV   84.2       Mono #   0.60       Mono %   7.0       MPV   10.0       Neut #   5.58       Neut %   64.7       nRBC   0.0       Platelet Count   196       Potassium   3.2       ProBNP   421.0  Comment:   For ambulatory patients presenting to outpatient facilities with clinical suspicion of HF not previously diagnosed and at least one sign, symptom or risk factor for HF, NT-proBNP II test results should be interpreted as indicated below:    <125(pg/mL):"Negative: Heart Failure Unlikely"    >=125(pg/mL):"Consider Heart Failure as well as other causes of NT-proBNP elevation"             PROTEIN TOTAL   7.3       RBC   5.00       RDW   14.8       Sodium   140       Troponin I 0.209   0.130       TSH   1.790       WBC   8.63               Significant Imaging: EKG: afib RVR, LBBB the LBBB is old    Physical Exam  Vitals reviewed.   Constitutional:       Appearance: Normal appearance. She is obese.   HENT:      Head: Normocephalic and atraumatic.      Nose: Nose normal.   Eyes:      Extraocular Movements: Extraocular movements intact.   Cardiovascular:      Rate and Rhythm: Normal rate. Rhythm irregular.      Pulses: Normal pulses.      " Heart sounds: Normal heart sounds.   Pulmonary:      Effort: Pulmonary effort is normal.      Breath sounds: Normal breath sounds.   Neurological:      General: No focal deficit present.      Mental Status: She is alert and oriented to person, place, and time.         The medication list below is from a previous hospital stay and needs to be re verified. She is no longer taking all of these medications.  Current Inpatient Medications:  No current facility-administered medications for this encounter.    Current Outpatient Medications:     acetaminophen (TYLENOL) 500 MG tablet, Take 1,000 mg by mouth nightly., Disp: , Rfl:     benazepriL (LOTENSIN) 20 MG tablet, Take 20 mg by mouth 2 (two) times daily., Disp: , Rfl:     cyanocobalamin 1,000 mcg/mL injection, Inject 1,000 mcg into the muscle every 30 days., Disp: , Rfl:     digoxin (LANOXIN) 125 mcg tablet, Take 125 mcg by mouth once daily., Disp: , Rfl:     ergocalciferol (ERGOCALCIFEROL) 50,000 unit Cap, Take 50,000 Units by mouth 2 (two) times daily., Disp: , Rfl:     EScitalopram oxalate (LEXAPRO) 10 MG tablet, , Disp: , Rfl:     folic acid (FOLVITE) 1 MG tablet, Take 1,000 mcg by mouth once daily., Disp: , Rfl:     furosemide (LASIX) 40 MG tablet, Take 40 mg by mouth once daily., Disp: , Rfl:     hydrALAZINE (APRESOLINE) 50 MG tablet, Take 50 mg by mouth 2 (two) times a day., Disp: , Rfl:     HYDROcodone-acetaminophen (NORCO) 7.5-325 mg per tablet, Take 1 tablet by mouth every 6 (six) hours as needed for Pain., Disp: 28 tablet, Rfl: 0    metoprolol succinate (TOPROL-XL) 25 MG 24 hr tablet, Take 1 tablet (25 mg total) by mouth once daily., Disp: 90 tablet, Rfl: 3    metoprolol tartrate (LOPRESSOR) 25 MG tablet, Take 25 mg by mouth Daily., Disp: , Rfl:     PATADAY TWICE DAILY RELIEF 0.1 % ophthalmic solution, Place 1 drop into both eyes 2 (two) times daily., Disp: , Rfl:     potassium chloride (KLOR-CON) 10 MEQ TbSR, 1 tablet once daily., Disp: , Rfl:      rosuvastatin (CRESTOR) 5 MG tablet, Take 5 mg by mouth every evening., Disp: , Rfl:     sotaloL (BETAPACE) 240 MG Tab, Take 240 mg by mouth 2 (two) times daily., Disp: , Rfl:     tobramycin sulfate 0.3% (TOBREX) 0.3 % ophthalmic solution, Place 1 drop into both eyes once., Disp: , Rfl:     VASCEPA 1 gram Cap, Take 1 capsule by mouth 2 (two) times a day., Disp: , Rfl:     XARELTO 20 mg Tab, Take 20 mg by mouth nightly., Disp: , Rfl:       VTE Risk Mitigation (From admission, onward)      None          Assessment :     PAF/Afib RVR  NSTEMI type II   HTN  HLD  LV dysfunction  PPM    Plan:     HR improved with IV metoprolol. Troponin is mildly elevated but patient denies CP, SOB or any angina type symptoms. Troponin elevation is likely due to demand from tachycardia. Recommend observation on telemetry, continue po metoprolol, digoxin and use IV lopressor prn if needed for HR >120 bpm sustained; continue NOAC. The patient states that some of her medications have been discontinued and she is now on entresto. She states that she was diagnosed with a weak heart but has no history of blockages. Recommend re verifying medication list and correcting it prior to resuming her cardiac medications. Do not give ACE with entresto.  She will need close follow up with her cardiologist after discharge.    Thank you for allowing me to participate in this patient's care. Call with any questions.                  Milvia Worley NP  Cardiology  [facility]  07/05/2024 2:12 AM

## 2024-07-05 NOTE — PLAN OF CARE
Problem: Adult Inpatient Plan of Care  Goal: Plan of Care Review  Outcome: Progressing  Goal: Patient-Specific Goal (Individualized)  Outcome: Progressing  Goal: Absence of Hospital-Acquired Illness or Injury  Outcome: Progressing  Goal: Optimal Comfort and Wellbeing  Outcome: Progressing  Goal: Readiness for Transition of Care  Outcome: Progressing     Problem: Fall Injury Risk  Goal: Absence of Fall and Fall-Related Injury  Outcome: Progressing     Problem: Comorbidity Management  Goal: Maintenance of Heart Failure Symptom Control  Outcome: Progressing  Goal: Blood Pressure in Desired Range  Outcome: Progressing  Goal: Bariatric Home Regimen Maintained  Outcome: Progressing

## 2024-07-05 NOTE — ED NOTES
A new connect request was successfully created for:  Cantu, Patsy  : 1945  MRN: [id number]  ConnectID: [id number]  REASON:  Admit: ICU  ACUITY:  10 Minutes  SUBMITTED:  2024 02:25 CDT

## 2024-07-06 VITALS
OXYGEN SATURATION: 95 % | TEMPERATURE: 98 F | WEIGHT: 182.13 LBS | SYSTOLIC BLOOD PRESSURE: 133 MMHG | RESPIRATION RATE: 20 BRPM | HEART RATE: 97 BPM | BODY MASS INDEX: 31.09 KG/M2 | HEIGHT: 64 IN | DIASTOLIC BLOOD PRESSURE: 69 MMHG

## 2024-07-06 LAB
ANION GAP SERPL CALC-SCNC: 5 MEQ/L (ref 2–13)
BUN SERPL-MCNC: 18 MG/DL (ref 7–20)
CALCIUM SERPL-MCNC: 9.5 MG/DL (ref 8.4–10.2)
CHLORIDE SERPL-SCNC: 106 MMOL/L (ref 98–110)
CO2 SERPL-SCNC: 28 MMOL/L (ref 21–32)
CREAT SERPL-MCNC: 1.02 MG/DL (ref 0.66–1.25)
CREAT/UREA NIT SERPL: 18 (ref 12–20)
GFR SERPLBLD CREATININE-BSD FMLA CKD-EPI: 56 ML/MIN/1.73/M2
GLUCOSE SERPL-MCNC: 103 MG/DL (ref 70–115)
POTASSIUM SERPL-SCNC: 3.7 MMOL/L (ref 3.5–5.1)
SODIUM SERPL-SCNC: 139 MMOL/L (ref 136–145)

## 2024-07-06 PROCEDURE — 36415 COLL VENOUS BLD VENIPUNCTURE: CPT | Performed by: FAMILY MEDICINE

## 2024-07-06 PROCEDURE — 94761 N-INVAS EAR/PLS OXIMETRY MLT: CPT

## 2024-07-06 PROCEDURE — 25000003 PHARM REV CODE 250

## 2024-07-06 PROCEDURE — 80048 BASIC METABOLIC PNL TOTAL CA: CPT | Performed by: FAMILY MEDICINE

## 2024-07-06 PROCEDURE — 25000003 PHARM REV CODE 250: Performed by: FAMILY MEDICINE

## 2024-07-06 RX ADMIN — METOPROLOL SUCCINATE 25 MG: 25 TABLET, EXTENDED RELEASE ORAL at 08:07

## 2024-07-06 RX ADMIN — DIGOXIN 125 MCG: 125 TABLET ORAL at 08:07

## 2024-07-06 RX ADMIN — SACUBITRIL AND VALSARTAN 1 TABLET: 49; 51 TABLET, FILM COATED ORAL at 08:07

## 2024-07-06 RX ADMIN — POTASSIUM CHLORIDE 20 MEQ: 1500 TABLET, EXTENDED RELEASE ORAL at 08:07

## 2024-07-06 RX ADMIN — FENOFIBRATE 48 MG: 48 TABLET ORAL at 08:07

## 2024-07-06 NOTE — PLAN OF CARE
Problem: Adult Inpatient Plan of Care  Goal: Plan of Care Review  Outcome: Met  Goal: Patient-Specific Goal (Individualized)  Outcome: Met  Goal: Absence of Hospital-Acquired Illness or Injury  Outcome: Met  Goal: Optimal Comfort and Wellbeing  Outcome: Met  Goal: Readiness for Transition of Care  Outcome: Met     Problem: Fall Injury Risk  Goal: Absence of Fall and Fall-Related Injury  Outcome: Met     Problem: Comorbidity Management  Goal: Maintenance of Heart Failure Symptom Control  Outcome: Met  Goal: Blood Pressure in Desired Range  Outcome: Met  Goal: Bariatric Home Regimen Maintained  Outcome: Met

## 2024-07-06 NOTE — DISCHARGE SUMMARY
Hospital Medicine  Discharge Summary    Patient Name: Patsy H Cantu  MRN: [id number]  Admit Date: 7/4/2024  Discharge Date:  07/06/2024  Status: IP- Inpatient   Length of Stay: 1      PHYSICIANS   Admitting Physician: Ernie Kim MD  Discharging Physician: Ernie Kim MD.  Primary Care Physician: Mery Sanon NP        DISCHARGE DIAGNOSES   1 - Atrial fibrillation with RVR: cardiology consulted, telemetry, continue current medical management plus PRN metoprolol IV  -cont digoxin/metoprolol     2 - NSTEMI Type II  demand ischemia: trend troponin, troponin elevation likely due to atrial fibrillation with RVR  Troponin trend down     3 - hypertension: continue current medical management     4- hypomagnesium  -replace     5- hypokalemia  -replace      PROCEDURES   * No surgery found *         HOSPITAL COURSE    Ms Cantu presented to the emergency room with a complaint of palpitations. She denies chest pain. She is followed by cardiology for paroxysmal atrial fibrillation. She had medications changed a couple months ago and medication titrated last week ago. She is on Xarelto. She is also on Entresto. She denies any cough or difficulty breathing. During exam her heart rate was noted to range between 60s and 120s. During this time she did not complaint any discomfort.  Cardiology was consulted from ER.     07/05/2024  No chest pain  HR better controlled this AM  Troponin up to 0.535 from 0.130, no chest pain    07/06/2024  Atrial fib well controlled overngiht  Trop cont to trend down  Nio more sob  Stable for d/c home will cardiology in [place] this week     STATUS  ImprovedStable    DISPOSITION  Discharge to home     DIET  Cardiac     ACTIVITY  As tolerated      FOLLOW-UP         DISCHARGE MEDICATION RECONCILIATION        Medication List        CHANGE how you take these medications      metoprolol succinate 25 MG 24 hr tablet  Commonly known as: TOPROL-XL  Take 1 tablet (25 mg total) by mouth once  daily.  What changed: Another medication with the same name was removed. Continue taking this medication, and follow the directions you see here.            CONTINUE taking these medications      acetaminophen 500 MG tablet  Commonly known as: TYLENOL     cyanocobalamin 1,000 mcg/mL injection     digoxin 125 mcg tablet  Commonly known as: LANOXIN     ENTRESTO 49-51 mg per tablet  Generic drug: sacubitriL-valsartan     ergocalciferol 50,000 unit Cap  Commonly known as: ERGOCALCIFEROL     EScitalopram oxalate 10 MG tablet  Commonly known as: LEXAPRO     fenofibrate 48 MG tablet  Commonly known as: TRICOR     folic acid 1 MG tablet  Commonly known as: FOLVITE     furosemide 40 MG tablet  Commonly known as: LASIX     hydrALAZINE 50 MG tablet  Commonly known as: APRESOLINE     HYDROcodone-acetaminophen 7.5-325 mg per tablet  Commonly known as: NORCO  Take 1 tablet by mouth every 6 (six) hours as needed for Pain.     PATADAY TWICE DAILY RELIEF 0.1 % ophthalmic solution  Generic drug: olopatadine     potassium chloride 10 MEQ Tbsr  Commonly known as: KLOR-CON     rosuvastatin 5 MG tablet  Commonly known as: CRESTOR     tobramycin sulfate 0.3% 0.3 % ophthalmic solution  Commonly known as: TOBREX     VASCEPA 1 gram Cap  Generic drug: icosapent ethyL     XARELTO 20 mg Tab  Generic drug: rivaroxaban            STOP taking these medications      benazepriL 20 MG tablet  Commonly known as: LOTENSIN     metoprolol tartrate 25 MG tablet  Commonly known as: LOPRESSOR     sotaloL 240 MG Tab  Commonly known as: BETAPACE                PHYSICAL EXAM   VITALS: T 98 °F (36.7 °C)   /69   P 97   RR 20   O2 95 %    Physical Exam  Vitals reviewed.   HENT:      Head: Normocephalic.   Cardiovascular:      Rate and Rhythm: Normal rate.   Pulmonary:      Effort: Pulmonary effort is normal.   Neurological:      Mental Status: She is alert.              DIAGNOSITCS   CBC:   Recent Labs   Lab 07/04/24  2306   WBC 8.63   HGB 14.1   HCT 42.1  "         CMP:   Recent Labs   Lab 07/04/24  2306 07/05/24  0751 07/06/24  0524   CALCIUM 9.7 9.9 9.5   ALBUMIN 4.2  --   --     141 139   K 3.2* 3.7 3.7   CO2 31 31 28    105 106   BUN 13 16 18   CREATININE 1.00 0.99 1.02   ALKPHOS 98  --   --    ALT 19  --   --    AST 27  --   --    BILITOT 0.8  --   --    MG 2.00  --   --      Estimated Creatinine Clearance: 46.5 mL/min (based on SCr of 1.02 mg/dL).    Labs within the past 24 hours have been reviewed.     COAG:  No results for input(s): "APTT", "INR", "PTT" in the last 168 hours.    CARDIAC ENZYMES:   Recent Labs     07/05/24  0107 07/05/24  0751 07/05/24  1500   TROPONINI 0.209* 0.535* 0.380*        No results for input(s): "AMYLASE", "LIPASE" in the last 168 hours.    No results for input(s): "POCTGLUCOSE" in the last 72 hours.     Microbiology Results (last 7 days)       ** No results found for the last 168 hours. **             No results for input(s): "CHOL", "TRIG", "HDL", "LDL" in the last 72 hours. No results found for: "LABA1C", "HGBA1C"     No results found.    N/A     Patient Screened for food insecurity, housing instability, transportation needs, utility difficulties, and interpersonal safety.  No needs identified    Discharge time: 33 minutes         Ernie Kim MD  [facility]      "

## 2024-07-06 NOTE — PLAN OF CARE
Problem: Adult Inpatient Plan of Care  Goal: Plan of Care Review  7/6/2024 0615 by Adriana Ramos RN  Outcome: Progressing  7/6/2024 0339 by Adriana Ramos RN  Outcome: Progressing  Goal: Patient-Specific Goal (Individualized)  7/6/2024 0615 by Adriana Ramos RN  Outcome: Progressing  7/6/2024 0339 by Adriana Ramos, RN  Outcome: Progressing  Goal: Absence of Hospital-Acquired Illness or Injury  7/6/2024 0615 by Adriana Ramos, RN  Outcome: Progressing  7/6/2024 0339 by Adriana Ramos, RN  Outcome: Progressing  Goal: Optimal Comfort and Wellbeing  7/6/2024 0615 by Adriana Ramos RN  Outcome: Progressing  7/6/2024 0339 by Adriana Ramos, RN  Outcome: Progressing  Goal: Readiness for Transition of Care  7/6/2024 0615 by Adriana Ramos, RN  Outcome: Progressing  7/6/2024 0339 by Adriana Ramos, RN  Outcome: Progressing     Problem: Comorbidity Management  Goal: Maintenance of Heart Failure Symptom Control  7/6/2024 0615 by Adriana Ramos, RN  Outcome: Progressing  7/6/2024 0339 by Adriana Ramos, RN  Outcome: Progressing  Goal: Blood Pressure in Desired Range  7/6/2024 0615 by Adriana Ramos RN  Outcome: Progressing  7/6/2024 0339 by Adriana Ramos, RN  Outcome: Progressing  Goal: Bariatric Home Regimen Maintained  7/6/2024 0615 by Adriana Ramos, RN  Outcome: Progressing  7/6/2024 0339 by Adriana Ramos, RN  Outcome: Progressing     Problem: Fall Injury Risk  Goal: Absence of Fall and Fall-Related Injury  7/6/2024 0615 by Adriana Ramos RN  Outcome: Progressing  7/6/2024 0339 by Adriana Ramos, RN  Outcome: Progressing

## 2024-07-08 LAB
OHS QRS DURATION: 154 MS
OHS QTC CALCULATION: 544 MS

## 2024-07-10 ENCOUNTER — PATIENT OUTREACH (OUTPATIENT)
Dept: ADMINISTRATIVE | Facility: CLINIC | Age: 79
End: 2024-07-10
Payer: MEDICARE

## 2024-08-05 PROBLEM — J96.01 ACUTE RESPIRATORY FAILURE WITH HYPOXIA AND HYPERCARBIA: Status: RESOLVED | Noted: 2024-05-03 | Resolved: 2024-08-05

## 2024-08-05 PROBLEM — J96.02 ACUTE RESPIRATORY FAILURE WITH HYPOXIA AND HYPERCARBIA: Status: RESOLVED | Noted: 2024-05-03 | Resolved: 2024-08-05

## 2024-12-04 ENCOUNTER — HOSPITAL ENCOUNTER (OUTPATIENT)
Dept: RADIOLOGY | Facility: HOSPITAL | Age: 79
Discharge: HOME | End: 2024-12-04
Attending: NURSE PRACTITIONER
Payer: MEDICARE

## 2024-12-04 DIAGNOSIS — R06.02 SOB (SHORTNESS OF BREATH): ICD-10-CM

## 2024-12-04 PROCEDURE — 71046 X-RAY EXAM CHEST 2 VIEWS: CPT | Mod: TC

## 2025-01-03 ENCOUNTER — HOSPITAL ENCOUNTER (INPATIENT)
Facility: HOSPITAL | Age: 80
LOS: 1 days | Discharge: HOME | DRG: 291 | End: 2025-01-05
Attending: SURGERY | Admitting: FAMILY MEDICINE
Payer: MEDICARE

## 2025-01-03 DIAGNOSIS — I50.9 ACUTE ON CHRONIC CONGESTIVE HEART FAILURE, UNSPECIFIED HEART FAILURE TYPE: Primary | ICD-10-CM

## 2025-01-03 DIAGNOSIS — R79.89 ELEVATED TROPONIN LEVEL: ICD-10-CM

## 2025-01-03 DIAGNOSIS — I10 PRIMARY HYPERTENSION: ICD-10-CM

## 2025-01-03 PROCEDURE — 80048 BASIC METABOLIC PNL TOTAL CA: CPT | Performed by: SURGERY

## 2025-01-03 PROCEDURE — 99285 EMERGENCY DEPT VISIT HI MDM: CPT

## 2025-01-03 PROCEDURE — 84484 ASSAY OF TROPONIN QUANT: CPT | Performed by: SURGERY

## 2025-01-03 PROCEDURE — 83880 ASSAY OF NATRIURETIC PEPTIDE: CPT | Performed by: SURGERY

## 2025-01-03 PROCEDURE — 85025 COMPLETE CBC W/AUTO DIFF WBC: CPT | Performed by: SURGERY

## 2025-01-04 PROBLEM — I50.43 ACUTE ON CHRONIC COMBINED SYSTOLIC AND DIASTOLIC CONGESTIVE HEART FAILURE: Status: ACTIVE | Noted: 2025-01-04

## 2025-01-04 PROBLEM — R09.02 HYPOXIA: Status: ACTIVE | Noted: 2025-01-04

## 2025-01-04 PROBLEM — I10 PRIMARY HYPERTENSION: Status: ACTIVE | Noted: 2025-01-04

## 2025-01-04 LAB
ANION GAP SERPL CALC-SCNC: 9 MEQ/L (ref 2–13)
BASOPHILS # BLD AUTO: 0.02 X10(3)/MCL (ref 0.01–0.08)
BASOPHILS NFR BLD AUTO: 0.3 % (ref 0.1–1.2)
BNP BLD-MCNC: 4210 PG/ML (ref 0–124.9)
BUN SERPL-MCNC: 23 MG/DL (ref 7–20)
CALCIUM SERPL-MCNC: 9 MG/DL (ref 8.4–10.2)
CHLORIDE SERPL-SCNC: 111 MMOL/L (ref 98–110)
CO2 SERPL-SCNC: 19 MMOL/L (ref 21–32)
CREAT SERPL-MCNC: 1 MG/DL (ref 0.66–1.25)
CREAT/UREA NIT SERPL: 23 (ref 12–20)
EOSINOPHIL # BLD AUTO: 0.21 X10(3)/MCL (ref 0.04–0.36)
EOSINOPHIL NFR BLD AUTO: 3.3 % (ref 0.7–7)
ERYTHROCYTE [DISTWIDTH] IN BLOOD BY AUTOMATED COUNT: 14.6 % (ref 11–14.5)
GFR SERPLBLD CREATININE-BSD FMLA CKD-EPI: 57 ML/MIN/1.73/M2
GLUCOSE SERPL-MCNC: 99 MG/DL (ref 70–115)
HCT VFR BLD AUTO: 37.6 % (ref 36–48)
HGB BLD-MCNC: 12.3 G/DL (ref 11.8–16)
IMM GRANULOCYTES # BLD AUTO: 0.06 X10(3)/MCL (ref 0–0.03)
IMM GRANULOCYTES NFR BLD AUTO: 0.9 % (ref 0–0.5)
LYMPHOCYTES # BLD AUTO: 1.08 X10(3)/MCL (ref 1.16–3.74)
LYMPHOCYTES NFR BLD AUTO: 17 % (ref 20–55)
MAGNESIUM SERPL-MCNC: 2.2 MG/DL (ref 1.8–2.4)
MCH RBC QN AUTO: 27.9 PG (ref 27–34)
MCHC RBC AUTO-ENTMCNC: 32.7 G/DL (ref 31–37)
MCV RBC AUTO: 85.3 FL (ref 79–99)
MONOCYTES # BLD AUTO: 0.39 X10(3)/MCL (ref 0.24–0.36)
MONOCYTES NFR BLD AUTO: 6.1 % (ref 4.7–12.5)
NEUTROPHILS # BLD AUTO: 4.59 X10(3)/MCL (ref 1.56–6.13)
NEUTROPHILS NFR BLD AUTO: 72.4 % (ref 37–73)
NRBC BLD AUTO-RTO: 0 %
PLATELET # BLD AUTO: 208 X10(3)/MCL (ref 140–371)
PMV BLD AUTO: 10.1 FL (ref 9.4–12.4)
POTASSIUM SERPL-SCNC: 3.9 MMOL/L (ref 3.5–5.1)
RBC # BLD AUTO: 4.41 X10(6)/MCL (ref 4–5.1)
SODIUM SERPL-SCNC: 139 MMOL/L (ref 136–145)
TROPONIN I SERPL-MCNC: 0.03 NG/ML (ref 0–0.03)
TROPONIN I SERPL-MCNC: 0.04 NG/ML (ref 0–0.03)
WBC # BLD AUTO: 6.35 X10(3)/MCL (ref 4–11.5)

## 2025-01-04 PROCEDURE — G0378 HOSPITAL OBSERVATION PER HR: HCPCS

## 2025-01-04 PROCEDURE — 63600175 PHARM REV CODE 636 W HCPCS

## 2025-01-04 PROCEDURE — 25000003 PHARM REV CODE 250

## 2025-01-04 PROCEDURE — 96376 TX/PRO/DX INJ SAME DRUG ADON: CPT

## 2025-01-04 PROCEDURE — 96375 TX/PRO/DX INJ NEW DRUG ADDON: CPT

## 2025-01-04 PROCEDURE — 94761 N-INVAS EAR/PLS OXIMETRY MLT: CPT

## 2025-01-04 PROCEDURE — 94799 UNLISTED PULMONARY SVC/PX: CPT

## 2025-01-04 PROCEDURE — 80162 ASSAY OF DIGOXIN TOTAL: CPT

## 2025-01-04 PROCEDURE — 83735 ASSAY OF MAGNESIUM: CPT | Performed by: FAMILY MEDICINE

## 2025-01-04 PROCEDURE — 63600175 PHARM REV CODE 636 W HCPCS: Mod: TB | Performed by: SURGERY

## 2025-01-04 PROCEDURE — 63600175 PHARM REV CODE 636 W HCPCS: Performed by: SURGERY

## 2025-01-04 PROCEDURE — 36415 COLL VENOUS BLD VENIPUNCTURE: CPT

## 2025-01-04 PROCEDURE — 27000221 HC OXYGEN, UP TO 24 HOURS

## 2025-01-04 PROCEDURE — 84484 ASSAY OF TROPONIN QUANT: CPT | Performed by: SURGERY

## 2025-01-04 PROCEDURE — 25000003 PHARM REV CODE 250: Performed by: SURGERY

## 2025-01-04 PROCEDURE — 96374 THER/PROPH/DIAG INJ IV PUSH: CPT

## 2025-01-04 PROCEDURE — 99900035 HC TECH TIME PER 15 MIN (STAT)

## 2025-01-04 RX ORDER — FENOFIBRATE 48 MG/1
48 TABLET, FILM COATED ORAL DAILY
Status: DISCONTINUED | OUTPATIENT
Start: 2025-01-04 | End: 2025-01-05 | Stop reason: HOSPADM

## 2025-01-04 RX ORDER — POTASSIUM CHLORIDE 750 MG/1
10 TABLET, EXTENDED RELEASE ORAL DAILY
Status: DISCONTINUED | OUTPATIENT
Start: 2025-01-04 | End: 2025-01-05 | Stop reason: HOSPADM

## 2025-01-04 RX ORDER — BUMETANIDE 0.25 MG/ML
1 INJECTION, SOLUTION INTRAMUSCULAR; INTRAVENOUS
Status: COMPLETED | OUTPATIENT
Start: 2025-01-04 | End: 2025-01-04

## 2025-01-04 RX ORDER — ESCITALOPRAM OXALATE 10 MG/1
10 TABLET ORAL DAILY
Status: DISCONTINUED | OUTPATIENT
Start: 2025-01-04 | End: 2025-01-05 | Stop reason: HOSPADM

## 2025-01-04 RX ORDER — FUROSEMIDE 10 MG/ML
40 INJECTION INTRAMUSCULAR; INTRAVENOUS EVERY 12 HOURS
Status: DISCONTINUED | OUTPATIENT
Start: 2025-01-04 | End: 2025-01-05 | Stop reason: HOSPADM

## 2025-01-04 RX ORDER — HYDROCODONE BITARTRATE AND ACETAMINOPHEN 5; 325 MG/1; MG/1
1 TABLET ORAL EVERY 4 HOURS PRN
Status: DISCONTINUED | OUTPATIENT
Start: 2025-01-04 | End: 2025-01-05 | Stop reason: HOSPADM

## 2025-01-04 RX ORDER — ACETAMINOPHEN 325 MG/1
650 TABLET ORAL EVERY 4 HOURS PRN
Status: DISCONTINUED | OUTPATIENT
Start: 2025-01-04 | End: 2025-01-05 | Stop reason: HOSPADM

## 2025-01-04 RX ORDER — ONDANSETRON HYDROCHLORIDE 2 MG/ML
4 INJECTION, SOLUTION INTRAVENOUS EVERY 8 HOURS PRN
Status: DISCONTINUED | OUTPATIENT
Start: 2025-01-04 | End: 2025-01-05 | Stop reason: HOSPADM

## 2025-01-04 RX ORDER — SODIUM CHLORIDE 0.9 % (FLUSH) 0.9 %
10 SYRINGE (ML) INJECTION
Status: DISCONTINUED | OUTPATIENT
Start: 2025-01-04 | End: 2025-01-05 | Stop reason: HOSPADM

## 2025-01-04 RX ORDER — HYDRALAZINE HYDROCHLORIDE 20 MG/ML
20 INJECTION INTRAMUSCULAR; INTRAVENOUS
Status: COMPLETED | OUTPATIENT
Start: 2025-01-04 | End: 2025-01-04

## 2025-01-04 RX ORDER — OMEGA-3-ACID ETHYL ESTERS 1 G/1
1 CAPSULE, LIQUID FILLED ORAL DAILY
COMMUNITY

## 2025-01-04 RX ORDER — ALPRAZOLAM 0.5 MG/1
0.5 TABLET ORAL 3 TIMES DAILY PRN
Status: DISCONTINUED | OUTPATIENT
Start: 2025-01-04 | End: 2025-01-05 | Stop reason: HOSPADM

## 2025-01-04 RX ORDER — CLONIDINE HYDROCHLORIDE 0.1 MG/1
0.1 TABLET ORAL
Status: COMPLETED | OUTPATIENT
Start: 2025-01-04 | End: 2025-01-04

## 2025-01-04 RX ORDER — LORAZEPAM 2 MG/ML
0.5 INJECTION INTRAMUSCULAR
Status: COMPLETED | OUTPATIENT
Start: 2025-01-04 | End: 2025-01-04

## 2025-01-04 RX ORDER — METOPROLOL SUCCINATE 25 MG/1
25 TABLET, EXTENDED RELEASE ORAL DAILY
Status: DISCONTINUED | OUTPATIENT
Start: 2025-01-04 | End: 2025-01-05 | Stop reason: HOSPADM

## 2025-01-04 RX ORDER — PROCHLORPERAZINE EDISYLATE 5 MG/ML
5 INJECTION INTRAMUSCULAR; INTRAVENOUS EVERY 6 HOURS PRN
Status: DISCONTINUED | OUTPATIENT
Start: 2025-01-04 | End: 2025-01-05 | Stop reason: HOSPADM

## 2025-01-04 RX ORDER — AMIODARONE HYDROCHLORIDE 200 MG/1
TABLET ORAL DAILY
COMMUNITY

## 2025-01-04 RX ORDER — HYDRALAZINE HYDROCHLORIDE 20 MG/ML
10 INJECTION INTRAMUSCULAR; INTRAVENOUS EVERY 4 HOURS PRN
Status: DISCONTINUED | OUTPATIENT
Start: 2025-01-04 | End: 2025-01-05 | Stop reason: HOSPADM

## 2025-01-04 RX ORDER — AMIODARONE HYDROCHLORIDE 200 MG/1
200 TABLET ORAL DAILY
Status: DISCONTINUED | OUTPATIENT
Start: 2025-01-04 | End: 2025-01-05 | Stop reason: HOSPADM

## 2025-01-04 RX ORDER — DIGOXIN 125 MCG
125 TABLET ORAL DAILY
Status: DISCONTINUED | OUTPATIENT
Start: 2025-01-04 | End: 2025-01-05 | Stop reason: HOSPADM

## 2025-01-04 RX ORDER — SACUBITRIL AND VALSARTAN 49; 51 MG/1; MG/1
1 TABLET, FILM COATED ORAL 2 TIMES DAILY
Status: DISCONTINUED | OUTPATIENT
Start: 2025-01-04 | End: 2025-01-05 | Stop reason: HOSPADM

## 2025-01-04 RX ADMIN — DIGOXIN 125 MCG: 125 TABLET ORAL at 09:01

## 2025-01-04 RX ADMIN — LORAZEPAM 0.5 MG: 2 INJECTION, SOLUTION INTRAMUSCULAR; INTRAVENOUS at 06:01

## 2025-01-04 RX ADMIN — RIVAROXABAN 20 MG: 20 TABLET, FILM COATED ORAL at 09:01

## 2025-01-04 RX ADMIN — CLONIDINE HYDROCHLORIDE 0.1 MG: 0.1 TABLET ORAL at 06:01

## 2025-01-04 RX ADMIN — ACETAMINOPHEN 650 MG: 325 TABLET, FILM COATED ORAL at 03:01

## 2025-01-04 RX ADMIN — SACUBITRIL AND VALSARTAN 1 TABLET: 49; 51 TABLET, FILM COATED ORAL at 09:01

## 2025-01-04 RX ADMIN — ACETAMINOPHEN 650 MG: 325 TABLET, FILM COATED ORAL at 09:01

## 2025-01-04 RX ADMIN — BUMETANIDE 1 MG: 0.25 INJECTION INTRAMUSCULAR; INTRAVENOUS at 01:01

## 2025-01-04 RX ADMIN — POTASSIUM CHLORIDE 10 MEQ: 750 TABLET, EXTENDED RELEASE ORAL at 09:01

## 2025-01-04 RX ADMIN — HYDRALAZINE HYDROCHLORIDE 20 MG: 20 INJECTION INTRAMUSCULAR; INTRAVENOUS at 04:01

## 2025-01-04 RX ADMIN — FUROSEMIDE 40 MG: 10 INJECTION, SOLUTION INTRAMUSCULAR; INTRAVENOUS at 09:01

## 2025-01-04 RX ADMIN — AMIODARONE HYDROCHLORIDE 200 MG: 200 TABLET ORAL at 09:01

## 2025-01-04 RX ADMIN — METOPROLOL SUCCINATE 25 MG: 25 TABLET, EXTENDED RELEASE ORAL at 09:01

## 2025-01-04 RX ADMIN — ESCITALOPRAM OXALATE 10 MG: 10 TABLET ORAL at 09:01

## 2025-01-04 RX ADMIN — NITROGLYCERIN 2 INCH: 20 OINTMENT TOPICAL at 03:01

## 2025-01-04 NOTE — SUBJECTIVE & OBJECTIVE
Past Medical History:   Diagnosis Date    A-fib     CHF (congestive heart failure)     Fluid retention     Hypercholesterolemia     Hypertension     Pacemaker     Paroxysmal atrial fibrillation        Past Surgical History:   Procedure Laterality Date    HYSTERECTOMY      JOINT REPLACEMENT Right     X2    SHOULDER ARTHROSCOPY Left 8/4/2023    Procedure: ARTHROSCOPY, SHOULDER;  Surgeon: Albert Sood MD;  Location: [facility];  Service: Orthopedics;  Laterality: Left;       Review of patient's allergies indicates:  No Known Allergies    No current facility-administered medications on file prior to encounter.     Current Outpatient Medications on File Prior to Encounter   Medication Sig    amiodarone (PACERONE) 200 MG Tab Take by mouth once daily.    digoxin (LANOXIN) 125 mcg tablet Take 125 mcg by mouth once daily.    EScitalopram oxalate (LEXAPRO) 10 MG tablet     fenofibrate (TRICOR) 48 MG tablet Take 48 mg by mouth once daily.    furosemide (LASIX) 40 MG tablet Take 40 mg by mouth once daily.    metoprolol succinate (TOPROL-XL) 25 MG 24 hr tablet Take 1 tablet (25 mg total) by mouth once daily.    omega-3 acid ethyl esters (LOVAZA) 1 gram capsule Take 1 g by mouth once daily.    potassium chloride (KLOR-CON) 10 MEQ TbSR 1 tablet once daily.    sacubitriL-valsartan (ENTRESTO) 49-51 mg per tablet Take 1 tablet by mouth 2 (two) times daily.    XARELTO 20 mg Tab Take 20 mg by mouth nightly.    [DISCONTINUED] acetaminophen (TYLENOL) 500 MG tablet Take 1,000 mg by mouth nightly.    [DISCONTINUED] cyanocobalamin 1,000 mcg/mL injection Inject 1,000 mcg into the muscle every 30 days.    [DISCONTINUED] ergocalciferol (ERGOCALCIFEROL) 50,000 unit Cap Take 50,000 Units by mouth 2 (two) times daily.    [DISCONTINUED] folic acid (FOLVITE) 1 MG tablet Take 1,000 mcg by mouth once daily.    [DISCONTINUED] hydrALAZINE (APRESOLINE) 50 MG tablet Take 50 mg by mouth 2 (two) times a day.    [DISCONTINUED] HYDROcodone-acetaminophen  (NORCO) 7.5-325 mg per tablet Take 1 tablet by mouth every 6 (six) hours as needed for Pain.    [DISCONTINUED] PATADAY TWICE DAILY RELIEF 0.1 % ophthalmic solution Place 1 drop into both eyes 2 (two) times daily.    [DISCONTINUED] rosuvastatin (CRESTOR) 5 MG tablet Take 5 mg by mouth every evening.    [DISCONTINUED] tobramycin sulfate 0.3% (TOBREX) 0.3 % ophthalmic solution Place 1 drop into both eyes once.    [DISCONTINUED] VASCEPA 1 gram Cap Take 2 capsules by mouth nightly.     Family History    None       Tobacco Use    Smoking status: Never    Smokeless tobacco: Never   Substance and Sexual Activity    Alcohol use: Never    Drug use: Never    Sexual activity: Not Currently     Review of Systems   Constitutional:  Negative for activity change, appetite change and fever.   Respiratory:  Positive for shortness of breath (better this am). Negative for chest tightness and wheezing.    Cardiovascular:  Negative for chest pain.   Gastrointestinal:  Negative for abdominal pain, constipation, diarrhea, nausea and vomiting.   Genitourinary:  Negative for dysuria.   Skin:  Negative for rash and wound.   Neurological:  Negative for tremors and headaches.     Objective:     Vital Signs (Most Recent):  Temp: 97.5 °F (36.4 °C) (01/04/25 0750)  Pulse: 70 (01/04/25 1120)  Resp: 20 (01/04/25 1120)  BP: 121/65 (01/04/25 0750)  SpO2: 96 % (01/04/25 1120) Vital Signs (24h Range):  Temp:  [97.5 °F (36.4 °C)-98.3 °F (36.8 °C)] 97.5 °F (36.4 °C)  Pulse:  [] 70  Resp:  [20-28] 20  SpO2:  [89 %-98 %] 96 %  BP: (121-185)/() 121/65     Weight: 82.2 kg (181 lb 4.8 oz)  Body mass index is 31.12 kg/m².     Physical Exam  Constitutional:       General: She is not in acute distress.     Appearance: Normal appearance. She is normal weight. She is not ill-appearing.   HENT:      Head: Normocephalic and atraumatic.   Eyes:      General: No scleral icterus.     Extraocular Movements: Extraocular movements intact.   Cardiovascular:       Rate and Rhythm: Normal rate and regular rhythm.      Pulses: Normal pulses.      Heart sounds: Normal heart sounds.   Pulmonary:      Effort: Pulmonary effort is normal.      Breath sounds: Normal breath sounds. No rhonchi or rales.   Abdominal:      General: Abdomen is flat. Bowel sounds are normal.      Palpations: Abdomen is soft.   Musculoskeletal:      Right lower leg: Edema present.      Left lower leg: Edema present.      Comments: Trace medardo     Skin:     General: Skin is warm and dry.      Capillary Refill: Capillary refill takes less than 2 seconds.      Findings: No erythema, lesion or rash.   Neurological:      General: No focal deficit present.      Mental Status: She is alert and oriented to person, place, and time.   Psychiatric:         Mood and Affect: Mood normal.         Behavior: Behavior normal.         Thought Content: Thought content normal.                Significant Labs: All pertinent labs within the past 24 hours have been reviewed.  CBC:   Recent Labs   Lab 01/03/25  2355   WBC 6.35   HGB 12.3   HCT 37.6        CMP:   Recent Labs   Lab 01/03/25  2355      K 3.9   *   CO2 19*   BUN 23*   CREATININE 1.00   CALCIUM 9.0       Significant Imaging: I have reviewed all pertinent imaging results/findings within the past 24 hours.

## 2025-01-04 NOTE — ASSESSMENT & PLAN NOTE
Problem: Pain - Standard  Goal: Alleviation of pain or a reduction in pain to the patient’s comfort goal  Outcome: Progressing     Problem: Safety - Medical Restraint  Goal: Remains free of injury from restraints (Restraint for Interference with Medical Device)  Outcome: Progressing   The patient is Watcher - Medium risk of patient condition declining or worsening    Shift Goals  Clinical Goals: neuro stability and improve,ent  Patient Goals: FELIPA  Family Goals: no family present    Progress made toward(s) clinical / shift goals:  Restraint checks and range of motion performed q2. No signs of injury. Pt remains at rist to pull at/remove lifesaving equipment. Pain managed with PRN medications    Patient is not progressing towards the following goals:       Due to CHF, imrpovign  Wean oxygen

## 2025-01-04 NOTE — ASSESSMENT & PLAN NOTE
"Patient has Combined Systolic and Diastolic heart failure that is Acute on chronic. On presentation their CHF was decompensated. Evidence of decompensated CHF on presentation includes: edema, elevated JVD, crackles on lung auscultation, orthopnea, and shortness of breath. The etiology of their decompensation is likely medication non-compliance. Most recent BNP and echo results are listed below.  No results for input(s): "BNP" in the last 72 hours.  Latest ECHO  Results for orders placed during the hospital encounter of 05/02/24    Echo Saline Bubble? No; Ultrasound enhancing contrast? Yes    Interpretation Summary    Left Ventricle: The left ventricle is normal in size. There is moderately reduced systolic function with a visually estimated ejection fraction of 35%.  The mid to distal anteroseptum appears hypokinetic.  There is diastolic dysfunction.    Right Ventricle: Normal right ventricular cavity size. Systolic function is mildly reduced.    Aortic Valve: There is mild aortic valve sclerosis. Mildly restricted motion. There is mild aortic regurgitation.    Mitral Valve: There is mild (1+) regurgitation.    Tricuspid Valve: There is mild to moderate (1-2+) regurgitation.    Pulmonic Valve: There is no stenosis. There is mild (1+) regurgitation.    The estimated pulmonary artery systolic pressure is 27 mmHg.    AICD/pacemaker lead noted.    Current Heart Failure Medications  digoxin tablet 125 mcg, Daily, Oral  metoprolol succinate (TOPROL-XL) 24 hr tablet 25 mg, Daily, Oral  sacubitriL-valsartan 49-51 mg per tablet 1 tablet, 2 times daily, Oral  furosemide injection 40 mg, Every 12 hours, Intravenous  hydrALAZINE injection 10 mg, Every 4 hours PRN, Intravenous    Plan  - Monitor strict I&Os and daily weights.    - Place on telemetry  - Low sodium diet  - Place on fluid restriction of 1.5 L.   - Cardiology has been consulted  - The patient's volume status is stable but not at their baseline as indicated by " orthopnea, dyspnea on exertion (TONY), and shortness of breath  -

## 2025-01-04 NOTE — ASSESSMENT & PLAN NOTE
Patient has long standing persistent (>12 months) atrial fibrillation. Patient is currently in atrial fibrillation. [id number] Score: 3. The patients heart rate in the last 24 hours is as follows:  Pulse  Min: 70  Max: 111     Antiarrhythmics  , Daily, Oral  amiodarone tablet 200 mg, Daily, Oral  metoprolol succinate (TOPROL-XL) 24 hr tablet 25 mg, Daily, Oral    Anticoagulants  rivaroxaban tablet 20 mg, Nightly, Oral    Plan  - Replete lytes with a goal of K>4, Mg >2  - Patient is anticoagulated, see medications listed above.  - Patient's afib is currently controlled  -  contineu home meds and xareleto to resume tonight

## 2025-01-04 NOTE — HPI
Shortness of Breath        Arrives via EMS. Had pacemaker battery replaced today at [facility] (Dr Mckeon [facility]iology). Pt woke up sob. 86-87% Room air. Placed on 2 lpm oxygen. . Paced Ventricular Rhythm. /123. Nitro paste placed. Currently on Macrobid for recent UTI.       78 YO WF W/ Hx/o CHF, AFIB, HTN, HLD PRESENTING W/ ACUTE ONSET DYSPNEA APPROX 1900 THIS EVENING.  +ARRIVED VIA EMS W/ RESOLUTION OF Sx EN ROUTE FOLLOWING SINGLE SL NTG.  NO CP.  NO AB PAIN.  NO NV.  +MEDICALLY NONCOMPLIANT THIS EVENING W/ LIFESTYLE NONCOMPLIANCE.  NO FC.  +PACEMAKER CHANGE EARLIER TODAY.     PT followed by DR. Mckeon has had increased SOB was in [facility] for CHF in May 2024, PCP is Mery Sanon.  She reoportedly had LHC in June/July 2024.  PT states she had worsening dypsnea then had battery for pacer changed apparently thought this would help her SOB.  Last several days had to hold entresto and hold her Lasic yesterday prior to procedure and she became acutely more SOB last nigh could not lie flat so family called EMS, sats were in the 80s on arrival she was palced on oxygen

## 2025-01-04 NOTE — ED NOTES
Success  A new connect request was successfully created for:  cantu, patsy  : 1945  MRN: [id number]  ConnectID: [id number]  REASON:  Admit: non-ICU  ACUITY:  30 Minutes  SUBMITTED:  2025 03:26 CST

## 2025-01-04 NOTE — ASSESSMENT & PLAN NOTE
Patient's blood pressure range in the last 24 hours was: BP  Min: 121/65  Max: 185/102.The patient's inpatient anti-hypertensive regimen is listed below:  Current Antihypertensives  metoprolol succinate (TOPROL-XL) 24 hr tablet 25 mg, Daily, Oral  furosemide injection 40 mg, Every 12 hours, Intravenous  hydrALAZINE injection 10 mg, Every 4 hours PRN, Intravenous    Plan  - BP is controlled, no changes needed to their regimen  -

## 2025-01-04 NOTE — ED PROVIDER NOTES
Encounter Date: 1/3/2025       History     Chief Complaint   Patient presents with    Shortness of Breath     Arrives via EMS. Had pacemaker battery replaced today at [facility] (Dr [facility]). Pt woke up sob. 86-87% Room air. Placed on 2 lpm oxygen. . Paced Ventricular Rhythm. /123. Nitro paste placed. Currently on Macrobid for recent UTI.      80 YO WF W/ Hx/o CHF, AFIB, HTN, HLD PRESENTING W/ ACUTE ONSET DYSPNEA APPROX 1900 THIS EVENING.  +ARRIVED VIA EMS W/ RESOLUTION OF Sx EN ROUTE FOLLOWING SINGLE SL NTG.  NO CP.  NO AB PAIN.  NO NV.  +MEDICALLY NONCOMPLIANT THIS EVENING W/ LIFESTYLE NONCOMPLIANCE.  NO FC.  +PACEMAKER CHANGE EARLIER TODAY.   LMP 13 NOV 2024        Review of patient's allergies indicates:  No Known Allergies  Past Medical History:   Diagnosis Date    A-fib     CHF (congestive heart failure)     Fluid retention     Hypercholesterolemia     Hypertension     Pacemaker     Paroxysmal atrial fibrillation      Past Surgical History:   Procedure Laterality Date    HYSTERECTOMY      JOINT REPLACEMENT Right     X2    SHOULDER ARTHROSCOPY Left 8/4/2023    Procedure: ARTHROSCOPY, SHOULDER;  Surgeon: Albert Sood MD;  Location: [facility];  Service: Orthopedics;  Laterality: Left;     No family history on file.  Social History     Tobacco Use    Smoking status: Never    Smokeless tobacco: Never   Substance Use Topics    Alcohol use: Never    Drug use: Never     Review of Systems   All other systems reviewed and are negative.      Physical Exam     Initial Vitals [01/03/25 2317]   BP Pulse Resp Temp SpO2   (!) 185/99 71 (!) 28 98.3 °F (36.8 °C) (!) 92 %      MAP       --         Physical Exam    Nursing note and vitals reviewed.  Constitutional: She appears well-developed and well-nourished. She is not diaphoretic. No distress.   HENT:   Head: Normocephalic and atraumatic.   Right Ear: External ear normal.   Left Ear: External ear normal.   Nose: Nose normal.  Mouth/Throat: Oropharynx is clear and moist. No oropharyngeal exudate.   Eyes: Conjunctivae and EOM are normal. Pupils are equal, round, and reactive to light. No scleral icterus.   Neck: Neck supple. No thyromegaly present. No tracheal deviation present. No JVD present.   Normal range of motion.  Cardiovascular:  Normal rate, regular rhythm, normal heart sounds and intact distal pulses.     Exam reveals no gallop.       No murmur heard.  Pulmonary/Chest: Breath sounds normal. No stridor. No respiratory distress. She has no wheezes. She has no rhonchi. She has no rales. She exhibits no tenderness.   Abdominal: Abdomen is soft. Bowel sounds are normal. She exhibits no distension and no mass. There is no abdominal tenderness. There is no rebound and no guarding.   Musculoskeletal:         General: Normal range of motion.      Cervical back: Normal range of motion and neck supple.     Lymphadenopathy:     She has no cervical adenopathy.   Neurological: She is alert and oriented to person, place, and time. She has normal strength. No cranial nerve deficit or sensory deficit.   Skin: Skin is warm.   Psychiatric: She has a normal mood and affect. Her behavior is normal. Judgment and thought content normal.         ED Course   Procedures  Labs Reviewed   BASIC METABOLIC PANEL - Abnormal       Result Value    Sodium 139      Potassium 3.9      Chloride 111 (*)     CO2 19 (*)     Glucose 99      Blood Urea Nitrogen 23 (*)     Creatinine 1.00      BUN/Creatinine Ratio 23 (*)     Calcium 9.0      Anion Gap 9.0      eGFR 57     NT-PRO NATRIURETIC PEPTIDE - Abnormal    ProBNP 4,210.0 (*)    CBC WITH DIFFERENTIAL - Abnormal    WBC 6.35      RBC 4.41      Hgb 12.3      Hct 37.6      MCV 85.3      MCH 27.9      MCHC 32.7      RDW 14.6 (*)     Platelet 208      MPV 10.1      Neut % 72.4      Lymph % 17.0 (*)     Mono % 6.1      Eos % 3.3      Basophil % 0.3      Lymph # 1.08 (*)     Neut # 4.59      Mono # 0.39 (*)     Eos # 0.21       Baso # 0.02      IG# 0.06 (*)     IG% 0.9 (*)     NRBC% 0.0     TROPONIN I - Abnormal    Troponin-I 0.045 (*)    TROPONIN I - Normal    Troponin-I 0.032     CBC W/ AUTO DIFFERENTIAL    Narrative:     The following orders were created for panel order CBC Auto Differential.  Procedure                               Abnormality         Status                     ---------                               -----------         ------                     CBC with Differential[[id number]]       Abnormal            Final result                 Please view results for these tests on the individual orders.     EKG Readings: (Independently Interpreted)   AV DUAL PACED         Imaging Results              X-Ray Chest AP Portable (Preliminary result)  Result time 01/04/25 00:57:21      Wet Read by Yuriy Cruz MD (01/04/25 00:57:21, [facility])    Congestive changes                                       Medications   hydrALAZINE injection 20 mg (has no administration in time range)   cloNIDine tablet 0.1 mg (has no administration in time range)   LORazepam injection 0.5 mg (has no administration in time range)   sodium chloride 0.9% flush 10 mL (has no administration in time range)   acetaminophen tablet 650 mg (has no administration in time range)   HYDROcodone-acetaminophen 5-325 mg per tablet 1 tablet (has no administration in time range)   ondansetron injection 4 mg (has no administration in time range)   prochlorperazine injection Soln 5 mg (has no administration in time range)   furosemide injection 40 mg (has no administration in time range)   bumetanide injection 1 mg (1 mg Intravenous Given 1/4/25 0194)   nitroGLYCERIN 2% TD oint ointment 2 inch (2 inches Topical (Top) Given 1/4/25 5534)     Medical Decision Making  Amount and/or Complexity of Data Reviewed  Labs: ordered. Decision-making details documented in ED Course.  Radiology: ordered and independent  interpretation performed.  Discussion of management or test interpretation with external provider(s): Tele cardiology feels the patient can safely say in [place], although we have no in-house cardiology over the weekend.  She has some mild congestive heart failure, uncontrolled blood pressure, and a very mildly elevated troponin.    Risk  Prescription drug management.               ED Course as of 01/04/25 0530   Sat Jan 04, 2025   0018 CO2(!): 19 [WV]   0018 Chloride(!): 111 [WV]   0029 Chloride(!): 111 [WV]   0030 CO2(!): 19 [WV]   0030 BUN(!): 23 [WV]   0030 ProBNP(!): 4,210.0 [WV]   0030 Troponin I: 0.032 [WV]   0251 WAITING FOR 0200 REPEAT TROPONIN   [WV]   0315 Troponin I(!): 0.045 [WV]      ED Course User Index  [WV] Yuriy Cruz MD                           Clinical Impression:  Final diagnoses:  [I10] Primary hypertension  [I50.9] Acute on chronic congestive heart failure, unspecified heart failure type (Primary)  [R79.89] Elevated troponin level          ED Disposition Condition    Observation Stable                Ron Quijano MD  01/04/25 1625

## 2025-01-04 NOTE — CONSULTS
[facility]    Cardiology  Consult Note    Patient Name: Patsy H Cantu  MRN: [id number]  Admission Date: 1/3/2025  Hospital Length of Stay: 0 days  Code Status: Prior   Attending Provider: Ron Quijano MD   Consulting Provider: Rob Andrews NP  Primary Care Physician: Mery Sanon NP  Principal Problem:<principal problem not specified>    Patient information was obtained from patient and ER records.     Subjective:     Chief Complaint/Reason for Consult: SOB    HPI: 79 year old female, known to Dr. Mckeon in , with PMH of CHF, HTN, HLD, PPM, who presented to the ER with SOB. Pt underwent generator change yesterday and reported having worsening dyspnea at home and incision site pain so EMS was called. Sats were in the mid 80s on arrival and she was placed on O2. She was given IV lasix with good response. ER workup revealed [id number], trop 0.032->0.045. At time of assessment she was feeling better and denied complaints.         Past Medical History:   Diagnosis Date    A-fib     CHF (congestive heart failure)     Fluid retention     Hypercholesterolemia     Hypertension     Pacemaker     Paroxysmal atrial fibrillation      Past Surgical History:   Procedure Laterality Date    HYSTERECTOMY      JOINT REPLACEMENT Right     X2    SHOULDER ARTHROSCOPY Left 8/4/2023    Procedure: ARTHROSCOPY, SHOULDER;  Surgeon: Albert Sood MD;  Location: [facility];  Service: Orthopedics;  Laterality: Left;     Review of patient's allergies indicates:  No Known Allergies  No current facility-administered medications on file prior to encounter.     Current Outpatient Medications on File Prior to Encounter   Medication Sig    amiodarone (PACERONE) 200 MG Tab Take by mouth once daily.    omega-3 acid ethyl esters (LOVAZA) 1 gram capsule Take 1 g by mouth once daily.    digoxin (LANOXIN) 125 mcg tablet Take 125 mcg by mouth once daily.    EScitalopram oxalate (LEXAPRO) 10 MG tablet     fenofibrate (TRICOR)  48 MG tablet Take 48 mg by mouth once daily.    furosemide (LASIX) 40 MG tablet Take 40 mg by mouth once daily.    metoprolol succinate (TOPROL-XL) 25 MG 24 hr tablet Take 1 tablet (25 mg total) by mouth once daily.    potassium chloride (KLOR-CON) 10 MEQ TbSR 1 tablet once daily.    sacubitriL-valsartan (ENTRESTO) 49-51 mg per tablet Take 1 tablet by mouth 2 (two) times daily.    XARELTO 20 mg Tab Take 20 mg by mouth nightly.    [DISCONTINUED] acetaminophen (TYLENOL) 500 MG tablet Take 1,000 mg by mouth nightly.    [DISCONTINUED] cyanocobalamin 1,000 mcg/mL injection Inject 1,000 mcg into the muscle every 30 days.    [DISCONTINUED] ergocalciferol (ERGOCALCIFEROL) 50,000 unit Cap Take 50,000 Units by mouth 2 (two) times daily.    [DISCONTINUED] folic acid (FOLVITE) 1 MG tablet Take 1,000 mcg by mouth once daily.    [DISCONTINUED] hydrALAZINE (APRESOLINE) 50 MG tablet Take 50 mg by mouth 2 (two) times a day.    [DISCONTINUED] HYDROcodone-acetaminophen (NORCO) 7.5-325 mg per tablet Take 1 tablet by mouth every 6 (six) hours as needed for Pain.    [DISCONTINUED] PATADAY TWICE DAILY RELIEF 0.1 % ophthalmic solution Place 1 drop into both eyes 2 (two) times daily.    [DISCONTINUED] rosuvastatin (CRESTOR) 5 MG tablet Take 5 mg by mouth every evening.    [DISCONTINUED] tobramycin sulfate 0.3% (TOBREX) 0.3 % ophthalmic solution Place 1 drop into both eyes once.    [DISCONTINUED] VASCEPA 1 gram Cap Take 2 capsules by mouth nightly.     Family History    None       Tobacco Use    Smoking status: Never    Smokeless tobacco: Never   Substance and Sexual Activity    Alcohol use: Never    Drug use: Never    Sexual activity: Not Currently       Review of Systems   Constitutional: Negative.    HENT: Negative.     Eyes: Negative.    Respiratory:  Positive for shortness of breath.    Cardiovascular: Negative.    Gastrointestinal: Negative.      Objective:     Vital Signs (Most Recent):  Temp: 98.3 °F (36.8 °C) (01/03/25 2317)  Pulse:  "105 (01/04/25 0359)  Resp: (!) 28 (01/03/25 2317)  BP: (!) 185/102 (01/04/25 0359)  SpO2: 96 % (01/04/25 0117) Vital Signs (24h Range):  Temp:  [98.3 °F (36.8 °C)] 98.3 °F (36.8 °C)  Pulse:  [] 105  Resp:  [28] 28  SpO2:  [89 %-96 %] 96 %  BP: (165-185)/() 185/102   Weight: 86.2 kg (190 lb)  Body mass index is 32.61 kg/m².  SpO2: 96 %     No intake or output data in the 24 hours ending 01/04/25 0530  Lines/Drains/Airways       Peripheral Intravenous Line  Duration                  Peripheral IV - Single Lumen 01/04/25 0000 20 G Left Antecubital <1 day                  Significant Labs:   Chemistries:   Recent Labs   Lab 01/03/25  2355 01/04/25  0212     --    K 3.9  --    *  --    CO2 19*  --    BUN 23*  --    CREATININE 1.00  --    CALCIUM 9.0  --    GLUCOSE 99  --    TROPONINI 0.032 0.045*        CBC/Anemia Labs: Coags:    Recent Labs   Lab 01/03/25 2355   WBC 6.35   HGB 12.3   HCT 37.6      MCV 85.3   RDW 14.6*    No results for input(s): "PT", "INR", "APTT" in the last 168 hours.     Significant Imaging:  Imaging Results              X-Ray Chest AP Portable (Preliminary result)  Result time 01/04/25 00:57:21      Wet Read by Yuriy Cruz MD (01/04/25 00:57:21, [facility], Emergency Medicine)    Congestive changes                                    EKG:   Telemetry:  paced    Physical Exam  Constitutional:       Appearance: Normal appearance.   HENT:      Head: Normocephalic.   Cardiovascular:      Rate and Rhythm: Normal rate and regular rhythm.   Pulmonary:      Effort: Pulmonary effort is normal.   Neurological:      General: No focal deficit present.      Mental Status: She is alert and oriented to person, place, and time.   Psychiatric:         Mood and Affect: Mood normal.         Behavior: Behavior normal.       Home Medications:   No current facility-administered medications on file prior to encounter.     Current Outpatient Medications " on File Prior to Encounter   Medication Sig Dispense Refill    amiodarone (PACERONE) 200 MG Tab Take by mouth once daily.      omega-3 acid ethyl esters (LOVAZA) 1 gram capsule Take 1 g by mouth once daily.      digoxin (LANOXIN) 125 mcg tablet Take 125 mcg by mouth once daily.      EScitalopram oxalate (LEXAPRO) 10 MG tablet       fenofibrate (TRICOR) 48 MG tablet Take 48 mg by mouth once daily.      furosemide (LASIX) 40 MG tablet Take 40 mg by mouth once daily.      metoprolol succinate (TOPROL-XL) 25 MG 24 hr tablet Take 1 tablet (25 mg total) by mouth once daily. 90 tablet 3    potassium chloride (KLOR-CON) 10 MEQ TbSR 1 tablet once daily.      sacubitriL-valsartan (ENTRESTO) 49-51 mg per tablet Take 1 tablet by mouth 2 (two) times daily.      XARELTO 20 mg Tab Take 20 mg by mouth nightly.      [DISCONTINUED] acetaminophen (TYLENOL) 500 MG tablet Take 1,000 mg by mouth nightly.      [DISCONTINUED] cyanocobalamin 1,000 mcg/mL injection Inject 1,000 mcg into the muscle every 30 days.      [DISCONTINUED] ergocalciferol (ERGOCALCIFEROL) 50,000 unit Cap Take 50,000 Units by mouth 2 (two) times daily.      [DISCONTINUED] folic acid (FOLVITE) 1 MG tablet Take 1,000 mcg by mouth once daily.      [DISCONTINUED] hydrALAZINE (APRESOLINE) 50 MG tablet Take 50 mg by mouth 2 (two) times a day.      [DISCONTINUED] HYDROcodone-acetaminophen (NORCO) 7.5-325 mg per tablet Take 1 tablet by mouth every 6 (six) hours as needed for Pain. 28 tablet 0    [DISCONTINUED] PATADAY TWICE DAILY RELIEF 0.1 % ophthalmic solution Place 1 drop into both eyes 2 (two) times daily.      [DISCONTINUED] rosuvastatin (CRESTOR) 5 MG tablet Take 5 mg by mouth every evening.      [DISCONTINUED] tobramycin sulfate 0.3% (TOBREX) 0.3 % ophthalmic solution Place 1 drop into both eyes once.      [DISCONTINUED] VASCEPA 1 gram Cap Take 2 capsules by mouth nightly.       Current Schedule Inpatient Medications:   cloNIDine  0.1 mg Oral ED 1 Time    hydrALAZINE   20 mg Intravenous ED 1 Time    lorazepam  0.5 mg Intravenous ED 1 Time     Continuous Infusions:    Assessment:   Acute on Chronic Systolic HF  Hypoxemia   HTN  Mild trop elevation in the setting of hypoxemia      No CP      Do not suspect ACS    Plan:   Admit for observation   Mild diuresis   Cont GDMT for CHF  Follow up with primary cardiologist as outpatient.         Thank you for your consult.     Rob Andrews NP  Cardiology  [facility]

## 2025-01-05 VITALS
RESPIRATION RATE: 20 BRPM | TEMPERATURE: 98 F | WEIGHT: 180.19 LBS | DIASTOLIC BLOOD PRESSURE: 94 MMHG | BODY MASS INDEX: 30.76 KG/M2 | SYSTOLIC BLOOD PRESSURE: 151 MMHG | OXYGEN SATURATION: 96 % | HEART RATE: 73 BPM | HEIGHT: 64 IN

## 2025-01-05 LAB
ANION GAP SERPL CALC-SCNC: 4 MEQ/L (ref 2–13)
BASOPHILS # BLD AUTO: 0.02 X10(3)/MCL (ref 0.01–0.08)
BASOPHILS NFR BLD AUTO: 0.3 % (ref 0.1–1.2)
BUN SERPL-MCNC: 16 MG/DL (ref 7–20)
CALCIUM SERPL-MCNC: 9.4 MG/DL (ref 8.4–10.2)
CHLORIDE SERPL-SCNC: 107 MMOL/L (ref 98–110)
CO2 SERPL-SCNC: 28 MMOL/L (ref 21–32)
CREAT SERPL-MCNC: 0.96 MG/DL (ref 0.66–1.25)
CREAT/UREA NIT SERPL: 17 (ref 12–20)
DIGOXIN SERPL-MCNC: 0.8 NG/ML (ref 0.8–2)
EOSINOPHIL # BLD AUTO: 0.34 X10(3)/MCL (ref 0.04–0.36)
EOSINOPHIL NFR BLD AUTO: 5.6 % (ref 0.7–7)
ERYTHROCYTE [DISTWIDTH] IN BLOOD BY AUTOMATED COUNT: 14.6 % (ref 11–14.5)
GFR SERPLBLD CREATININE-BSD FMLA CKD-EPI: 60 ML/MIN/1.73/M2
GLUCOSE SERPL-MCNC: 104 MG/DL (ref 70–115)
HCT VFR BLD AUTO: 39.1 % (ref 36–48)
HGB BLD-MCNC: 12.6 G/DL (ref 11.8–16)
IMM GRANULOCYTES # BLD AUTO: 0.06 X10(3)/MCL (ref 0–0.03)
IMM GRANULOCYTES NFR BLD AUTO: 1 % (ref 0–0.5)
LYMPHOCYTES # BLD AUTO: 1.16 X10(3)/MCL (ref 1.16–3.74)
LYMPHOCYTES NFR BLD AUTO: 19.1 % (ref 20–55)
MAGNESIUM SERPL-MCNC: 2.2 MG/DL (ref 1.8–2.4)
MCH RBC QN AUTO: 27.6 PG (ref 27–34)
MCHC RBC AUTO-ENTMCNC: 32.2 G/DL (ref 31–37)
MCV RBC AUTO: 85.7 FL (ref 79–99)
MONOCYTES # BLD AUTO: 0.4 X10(3)/MCL (ref 0.24–0.36)
MONOCYTES NFR BLD AUTO: 6.6 % (ref 4.7–12.5)
NEUTROPHILS # BLD AUTO: 4.09 X10(3)/MCL (ref 1.56–6.13)
NEUTROPHILS NFR BLD AUTO: 67.4 % (ref 37–73)
NRBC BLD AUTO-RTO: 0 %
PLATELET # BLD AUTO: 218 X10(3)/MCL (ref 140–371)
PMV BLD AUTO: 10.4 FL (ref 9.4–12.4)
POTASSIUM SERPL-SCNC: 3.9 MMOL/L (ref 3.5–5.1)
RBC # BLD AUTO: 4.56 X10(6)/MCL (ref 4–5.1)
SODIUM SERPL-SCNC: 139 MMOL/L (ref 136–145)
TROPONIN I SERPL-MCNC: 0.02 NG/ML (ref 0–0.03)
WBC # BLD AUTO: 6.07 X10(3)/MCL (ref 4–11.5)

## 2025-01-05 PROCEDURE — 96376 TX/PRO/DX INJ SAME DRUG ADON: CPT

## 2025-01-05 PROCEDURE — 11000001 HC ACUTE MED/SURG PRIVATE ROOM

## 2025-01-05 PROCEDURE — 80048 BASIC METABOLIC PNL TOTAL CA: CPT

## 2025-01-05 PROCEDURE — 36415 COLL VENOUS BLD VENIPUNCTURE: CPT

## 2025-01-05 PROCEDURE — 85025 COMPLETE CBC W/AUTO DIFF WBC: CPT

## 2025-01-05 PROCEDURE — 84484 ASSAY OF TROPONIN QUANT: CPT

## 2025-01-05 PROCEDURE — 83735 ASSAY OF MAGNESIUM: CPT

## 2025-01-05 PROCEDURE — 63600175 PHARM REV CODE 636 W HCPCS

## 2025-01-05 PROCEDURE — 94761 N-INVAS EAR/PLS OXIMETRY MLT: CPT

## 2025-01-05 PROCEDURE — 25000003 PHARM REV CODE 250

## 2025-01-05 RX ORDER — DOXYCYCLINE 100 MG/1
100 CAPSULE ORAL EVERY 12 HOURS
Qty: 10 CAPSULE | Refills: 0 | Status: SHIPPED | OUTPATIENT
Start: 2025-01-05

## 2025-01-05 RX ORDER — FUROSEMIDE 40 MG/1
40 TABLET ORAL 2 TIMES DAILY
Qty: 60 TABLET | Refills: 1 | Status: SHIPPED | OUTPATIENT
Start: 2025-01-05

## 2025-01-05 RX ADMIN — POTASSIUM CHLORIDE 10 MEQ: 750 TABLET, EXTENDED RELEASE ORAL at 08:01

## 2025-01-05 RX ADMIN — SACUBITRIL AND VALSARTAN 1 TABLET: 49; 51 TABLET, FILM COATED ORAL at 08:01

## 2025-01-05 RX ADMIN — ESCITALOPRAM OXALATE 10 MG: 10 TABLET ORAL at 08:01

## 2025-01-05 RX ADMIN — AMIODARONE HYDROCHLORIDE 200 MG: 200 TABLET ORAL at 08:01

## 2025-01-05 RX ADMIN — METOPROLOL SUCCINATE 25 MG: 25 TABLET, EXTENDED RELEASE ORAL at 08:01

## 2025-01-05 RX ADMIN — DIGOXIN 125 MCG: 125 TABLET ORAL at 08:01

## 2025-01-05 RX ADMIN — FUROSEMIDE 40 MG: 10 INJECTION, SOLUTION INTRAMUSCULAR; INTRAVENOUS at 08:01

## 2025-01-05 NOTE — DISCHARGE SUMMARY
[facility]/Surg  [facility] Medicine  Discharge Summary      Patient Name: Patsy H Cantu  MRN: [id number]  JENA: [id number]  Patient Class: IP- Inpatient  Admission Date: 1/3/2025  Hospital Length of Stay: 0 days  Discharge Date and Time: No discharge date for patient encounter.  Attending Physician: Elisha Moya MD   Discharging Provider: Elisha Moya MD  Primary Care Provider: Mery Sanon, NP    Primary Care Team: Networked reference to record PCT     HPI:   Shortness of Breath        Arrives via EMS. Had pacemaker battery replaced today at [facility] (Dr Mckeon [facility]). Pt woke up sob. 86-87% Room air. Placed on 2 lpm oxygen. . Paced Ventricular Rhythm. /123. Nitro paste placed. Currently on Macrobid for recent UTI.       78 YO WF W/ Hx/o CHF, AFIB, HTN, HLD PRESENTING W/ ACUTE ONSET DYSPNEA APPROX 1900 THIS EVENING.  +ARRIVED VIA EMS W/ RESOLUTION OF Sx EN ROUTE FOLLOWING SINGLE SL NTG.  NO CP.  NO AB PAIN.  NO NV.  +MEDICALLY NONCOMPLIANT THIS EVENING W/ LIFESTYLE NONCOMPLIANCE.  NO FC.  +PACEMAKER CHANGE EARLIER TODAY.     PT followed by DR. Mckeon has had increased SOB was in [facility] for CHF in May 2024, PCP is Mery Sanon.  She reoportedly had LHC in June/July 2024.  PT states she had worsening dypsnea then had battery for pacer changed apparently thought this would help her SOB.  Last several days had to hold entresto and hold her Lasic yesterday prior to procedure and she became acutely more SOB last nigh could not lie flat so family called EMS, sats were in the 80s on arrival she was palced on oxygen      * No surgery found *      Hospital Course:   01/05/2025 DISCHARGE SUMMARY: pt admitted after recent pacemaker battery change with acute worsening of SOB and some increased LE edema, pulmonary edema on CXR>  Pt was admitted and diuresed with iv lasix, she has done well with this and ready for d/c home.  Mild redness to left pacer incision side, will  "send with doxycycline 100mg bid, she will keep f/u with PCP Mery Sanon as well as Dr. Demond Mckeon already scheduled.  Pt would benefit from home health with PT at home after d/c.     Goals of Care Treatment Preferences:  Code Status: Full Code      SDOH Screening:  The patient was screened for utility difficulties, food insecurity, transport difficulties, housing insecurity, and interpersonal safety and there were no concerns identified this admission.     Consults:   Consults (From admission, onward)          Status Ordering Provider     Inpatient consult to Social Work/Case Management  Once        Provider:  (Not yet assigned)    Ordered CAMERON ELIZABETH     Inpatient consult to Social Work/Case Management  Once        Provider:  (Not yet assigned)    Ordered CAMERON ELIZABETH     Inpatient consult to Registered Dietitian/Nutritionist  Once        Provider:  (Not yet assigned)    Acknowledged DEISY RICKS     Inpatient consult to Telemedicine-Card  Once        Provider:  (Not yet assigned)    Completed MONSERRAT PALACIO            * Acute on chronic combined systolic and diastolic congestive heart failure  Patient has Combined Systolic and Diastolic heart failure that is Acute on chronic. On presentation their CHF was decompensated. Evidence of decompensated CHF on presentation includes: edema, elevated JVD, crackles on lung auscultation, orthopnea, and shortness of breath. The etiology of their decompensation is likely medication non-compliance. Most recent BNP and echo results are listed below.  No results for input(s): "BNP" in the last 72 hours.  Latest ECHO  Results for orders placed during the hospital encounter of 05/02/24    Echo Saline Bubble? No; Ultrasound enhancing contrast? Yes    Interpretation Summary    Left Ventricle: The left ventricle is normal in size. There is moderately reduced systolic function with a visually estimated ejection fraction of 35%.  The mid to distal anteroseptum " appears hypokinetic.  There is diastolic dysfunction.    Right Ventricle: Normal right ventricular cavity size. Systolic function is mildly reduced.    Aortic Valve: There is mild aortic valve sclerosis. Mildly restricted motion. There is mild aortic regurgitation.    Mitral Valve: There is mild (1+) regurgitation.    Tricuspid Valve: There is mild to moderate (1-2+) regurgitation.    Pulmonic Valve: There is no stenosis. There is mild (1+) regurgitation.    The estimated pulmonary artery systolic pressure is 27 mmHg.    AICD/pacemaker lead noted.    Current Heart Failure Medications  digoxin tablet 125 mcg, Daily, Oral  metoprolol succinate (TOPROL-XL) 24 hr tablet 25 mg, Daily, Oral  sacubitriL-valsartan 49-51 mg per tablet 1 tablet, 2 times daily, Oral  furosemide injection 40 mg, Every 12 hours, Intravenous  hydrALAZINE injection 10 mg, Every 4 hours PRN, Intravenous    Plan  - Monitor strict I&Os and daily weights.    - Place on telemetry  - Low sodium diet  - Place on fluid restriction of 1.5 L.   - Cardiology has been consulted  - The patient's volume status is stable but not at their baseline as indicated by orthopnea, dyspnea on exertion (TONY), and shortness of breath  -          Primary hypertension  Patient's blood pressure range in the last 24 hours was: BP  Min: 121/65  Max: 185/102.The patient's inpatient anti-hypertensive regimen is listed below:  Current Antihypertensives  metoprolol succinate (TOPROL-XL) 24 hr tablet 25 mg, Daily, Oral  furosemide injection 40 mg, Every 12 hours, Intravenous  hydrALAZINE injection 10 mg, Every 4 hours PRN, Intravenous    Plan  - BP is controlled, no changes needed to their regimen  -      Hypoxia  Due to CHF, imrpovign  Wean oxygen      Elevated troponin  Trend enzymes      Atrial fibrillation with RVR  Patient has long standing persistent (>12 months) atrial fibrillation. Patient is currently in atrial fibrillation. [id number] Score: 3. The patients heart rate in  the last 24 hours is as follows:  Pulse  Min: 70  Max: 111     Antiarrhythmics  , Daily, Oral  amiodarone tablet 200 mg, Daily, Oral  metoprolol succinate (TOPROL-XL) 24 hr tablet 25 mg, Daily, Oral    Anticoagulants  rivaroxaban tablet 20 mg, Nightly, Oral    Plan  - Replete lytes with a goal of K>4, Mg >2  - Patient is anticoagulated, see medications listed above.  - Patient's afib is currently controlled  -  contineu home meds and xareleto to resume tonight          Final Active Diagnoses:    Diagnosis Date Noted POA    PRINCIPAL PROBLEM:  Acute on chronic combined systolic and diastolic congestive heart failure [I50.43] 01/04/2025 Yes    Hypoxia [R09.02] 01/04/2025 Yes    Primary hypertension [I10] 01/04/2025 Yes    Atrial fibrillation with RVR [I48.91] 07/05/2024 Yes    Elevated troponin [R79.89] 07/05/2024 Yes      Problems Resolved During this Admission:       Discharged Condition: good    Disposition: Home or Self Care    Follow Up:   Follow-up Information       Mrey Sanon NP Follow up.    Specialty: Family Medicine  Contact information:  [address] [place] [address] [phone]               Demond Mckeon MD Follow up.    Specialties: Cardiovascular Disease, Cardiology  Why: keep appt  Contact information:  [address] [place] [address] [phone]                           Patient Instructions:      Activity as tolerated       Significant Diagnostic Studies: Labs: CMP   Recent Labs   Lab 01/03/25  2355 01/05/25  0514    139   K 3.9 3.9   * 107   CO2 19* 28   BUN 23* 16   CREATININE 1.00 0.96   CALCIUM 9.0 9.4    and CBC   Recent Labs   Lab 01/03/25  2355 01/05/25  0514   WBC 6.35 6.07   HGB 12.3 12.6   HCT 37.6 39.1    218       Pending Diagnostic Studies:       None           Medications:  Reconciled Home Medications:      Medication List        CHANGE how you take these medications      furosemide 40 MG tablet  Commonly known as: LASIX  Take 1  tablet (40 mg total) by mouth 2 (two) times a day.  What changed: when to take this            CONTINUE taking these medications      amiodarone 200 MG Tab  Commonly known as: PACERONE  Take by mouth once daily.     digoxin 125 mcg tablet  Commonly known as: LANOXIN  Take 125 mcg by mouth once daily.     EScitalopram oxalate 10 MG tablet  Commonly known as: LEXAPRO     fenofibrate 48 MG tablet  Commonly known as: TRICOR  Take 48 mg by mouth once daily.     metoprolol succinate 25 MG 24 hr tablet  Commonly known as: TOPROL-XL  Take 1 tablet (25 mg total) by mouth once daily.     omega-3 acid ethyl esters 1 gram capsule  Commonly known as: LOVAZA  Take 1 g by mouth once daily.     potassium chloride 10 MEQ Tbsr  Commonly known as: KLOR-CON  1 tablet once daily.            ASK your doctor about these medications      ENTRESTO 49-51 mg per tablet  Generic drug: sacubitriL-valsartan  Take 1 tablet by mouth 2 (two) times daily.     XARELTO 20 mg Tab  Generic drug: rivaroxaban  Take 20 mg by mouth nightly.              Indwelling Lines/Drains at time of discharge:   Lines/Drains/Airways       Drain  Duration             Female External Urinary Catheter w/ Suction 01/04/25 1141 1 day                    Time spent on the discharge of patient: 36 minutes     Patient Screened for food insecurity, housing instability, transportation needs, utility difficulties, and interpersonal safety.  No needs identified    Physical Exam  Constitutional:       General: She is not in acute distress.     Appearance: Normal appearance. She is normal weight. She is not ill-appearing.   Cardiovascular:      Rate and Rhythm: Normal rate and regular rhythm.      Pulses: Normal pulses.      Heart sounds: Normal heart sounds.   Pulmonary:      Effort: Pulmonary effort is normal.      Breath sounds: Normal breath sounds.   Abdominal:      General: Abdomen is flat.      Palpations: Abdomen is soft.   Skin:     General: Skin is warm and dry.       Findings: No erythema, lesion or rash.   Neurological:      Mental Status: She is alert.   Psychiatric:         Behavior: Behavior normal.      Comments: I had a face to face encounter with this patient prior to d/c         Elisha Moya MD  Department of Hospital Medicine  [facility]/Surg

## 2025-01-05 NOTE — NURSING
Patient given discharge instructions and verbalized understanding. Patient will follow-up with Dr. Sanon and Dr. Mckeon. Patient transported via wheelchair by [facility]. Patient in stable condition upon discharge.

## 2025-01-05 NOTE — HOSPITAL COURSE
01/05/2025 DISCHARGE SUMMARY: pt admitted after recent pacemaker battery change with acute worsening of SOB and some increased LE edema, pulmonary edema on CXR>  Pt was admitted and diuresed with iv lasix, she has done well with this and ready for d/c home.  Mild redness to left pacer incision side, will send with doxycycline 100mg bid, she will keep f/u with PCP Mery Sanon as well as Dr. Demond Mckeon already scheduled.  Pt would benefit from home health with PT at home after d/c.

## 2025-01-06 NOTE — PLAN OF CARE
Problem: Adult Inpatient Plan of Care  Goal: Plan of Care Review  Outcome: Met  Goal: Patient-Specific Goal (Individualized)  Outcome: Met  Goal: Absence of Hospital-Acquired Illness or Injury  Outcome: Met  Goal: Optimal Comfort and Wellbeing  Outcome: Met  Goal: Readiness for Transition of Care  Outcome: Met     Problem: Fall Injury Risk  Goal: Absence of Fall and Fall-Related Injury  Outcome: Met     
  Problem: Adult Inpatient Plan of Care  Goal: Plan of Care Review  Outcome: Progressing  Goal: Patient-Specific Goal (Individualized)  Outcome: Progressing  Goal: Absence of Hospital-Acquired Illness or Injury  Outcome: Progressing  Goal: Optimal Comfort and Wellbeing  Outcome: Progressing  Goal: Readiness for Transition of Care  Outcome: Progressing     Problem: Fall Injury Risk  Goal: Absence of Fall and Fall-Related Injury  Outcome: Progressing     
  Problem: Adult Inpatient Plan of Care  Goal: Plan of Care Review  Outcome: Progressing  Goal: Patient-Specific Goal (Individualized)  Outcome: Progressing  Goal: Absence of Hospital-Acquired Illness or Injury  Outcome: Progressing  Goal: Optimal Comfort and Wellbeing  Outcome: Progressing  Goal: Readiness for Transition of Care  Outcome: Progressing     Problem: Fall Injury Risk  Goal: Absence of Fall and Fall-Related Injury  Outcome: Progressing     
Referral made to DOMITILA ORTEGA as requested  
Bibiana Cruz, MS, RD [id number]

## 2025-01-07 LAB
OHS QRS DURATION: 202 MS
OHS QTC CALCULATION: 542 MS

## 2025-01-08 ENCOUNTER — PATIENT OUTREACH (OUTPATIENT)
Dept: ADMINISTRATIVE | Facility: CLINIC | Age: 80
End: 2025-01-08
Payer: MEDICARE

## 2025-01-08 NOTE — PROGRESS NOTES
C3 nurse attempted to contact Patsy H Cantu for a TCC post hospital discharge follow up call. No answer. The patient does not have a scheduled HOSFU appointment, and the pt does not have an [facility] PCP.

## 2025-01-09 NOTE — PROGRESS NOTES
C3 nurse spoke with Patsy H Cantu for a TCC post hospital discharge follow up call. The patient stated she has a scheduled [facility] appointment with Mery Sanon NP on 1/9/25 @ 1:30.

## 2025-01-20 ENCOUNTER — LAB REQUISITION (OUTPATIENT)
Dept: LAB | Facility: HOSPITAL | Age: 80
End: 2025-01-20
Payer: MEDICARE

## 2025-01-20 DIAGNOSIS — N39.0 URINARY TRACT INFECTION, SITE NOT SPECIFIED: ICD-10-CM

## 2025-01-20 LAB
BACTERIA #/AREA URNS AUTO: ABNORMAL /HPF
BILIRUB UR QL STRIP.AUTO: NEGATIVE
CLARITY UR: CLEAR
COLOR UR AUTO: YELLOW
GLUCOSE UR QL STRIP: NEGATIVE
HGB UR QL STRIP: ABNORMAL
HYALINE CASTS URNS QL MICRO: ABNORMAL /LPF
KETONES UR QL STRIP: NEGATIVE
LEUKOCYTE ESTERASE UR QL STRIP: NEGATIVE
MUCOUS THREADS URNS QL MICRO: ABNORMAL /LPF
NITRITE UR QL STRIP: NEGATIVE
PH UR STRIP: 6 [PH]
PROT UR QL STRIP: NEGATIVE
RBC #/AREA URNS AUTO: ABNORMAL /HPF
SP GR UR STRIP.AUTO: 1.02 (ref 1–1.03)
SQUAMOUS #/AREA URNS AUTO: ABNORMAL /HPF
UROBILINOGEN UR STRIP-ACNC: 0.2
WBC #/AREA URNS AUTO: ABNORMAL /HPF

## 2025-01-20 PROCEDURE — 81003 URINALYSIS AUTO W/O SCOPE: CPT | Performed by: NURSE PRACTITIONER

## 2025-01-20 PROCEDURE — 81015 MICROSCOPIC EXAM OF URINE: CPT | Performed by: NURSE PRACTITIONER

## 2025-01-20 PROCEDURE — 87086 URINE CULTURE/COLONY COUNT: CPT | Performed by: NURSE PRACTITIONER

## 2025-01-22 LAB — BACTERIA UR CULT: NORMAL

## 2025-01-26 ENCOUNTER — HOSPITAL ENCOUNTER (INPATIENT)
Facility: HOSPITAL | Age: 80
LOS: 2 days | Discharge: HOME HEALTH | DRG: 308 | End: 2025-01-28
Attending: FAMILY MEDICINE | Admitting: FAMILY MEDICINE
Payer: MEDICARE

## 2025-01-26 DIAGNOSIS — I48.91 A-FIB: ICD-10-CM

## 2025-01-26 DIAGNOSIS — I48.91 ATRIAL FIBRILLATION, UNSPECIFIED TYPE: Primary | ICD-10-CM

## 2025-01-26 DIAGNOSIS — R00.0 RAPID HEART BEAT: ICD-10-CM

## 2025-01-26 LAB
ALBUMIN SERPL-MCNC: 4.1 G/DL (ref 3.4–5)
ALBUMIN/GLOB SERPL: 1.6 RATIO
ALP SERPL-CCNC: 64 UNIT/L (ref 50–144)
ALT SERPL-CCNC: 19 UNIT/L (ref 1–45)
ANION GAP SERPL CALC-SCNC: 6 MEQ/L (ref 2–13)
AST SERPL-CCNC: 26 UNIT/L (ref 14–36)
BASOPHILS # BLD AUTO: 0.01 X10(3)/MCL (ref 0.01–0.08)
BASOPHILS NFR BLD AUTO: 0.2 % (ref 0.1–1.2)
BILIRUB SERPL-MCNC: 0.7 MG/DL (ref 0–1)
BNP BLD-MCNC: 5650 PG/ML (ref 0–124.9)
BUN SERPL-MCNC: 26 MG/DL (ref 7–20)
CALCIUM SERPL-MCNC: 9.4 MG/DL (ref 8.4–10.2)
CHLORIDE SERPL-SCNC: 109 MMOL/L (ref 98–110)
CO2 SERPL-SCNC: 27 MMOL/L (ref 21–32)
CREAT SERPL-MCNC: 1.11 MG/DL (ref 0.66–1.25)
CREAT/UREA NIT SERPL: 23 (ref 12–20)
EOSINOPHIL # BLD AUTO: 0.15 X10(3)/MCL (ref 0.04–0.36)
EOSINOPHIL NFR BLD AUTO: 2.5 % (ref 0.7–7)
ERYTHROCYTE [DISTWIDTH] IN BLOOD BY AUTOMATED COUNT: 15.7 % (ref 11–14.5)
GFR SERPLBLD CREATININE-BSD FMLA CKD-EPI: 51 ML/MIN/1.73/M2
GLOBULIN SER-MCNC: 2.6 GM/DL (ref 2–3.9)
GLUCOSE SERPL-MCNC: 114 MG/DL (ref 70–115)
HCT VFR BLD AUTO: 44.8 % (ref 36–48)
HGB BLD-MCNC: 13.5 G/DL (ref 11.8–16)
IMM GRANULOCYTES # BLD AUTO: 0.01 X10(3)/MCL (ref 0–0.03)
IMM GRANULOCYTES NFR BLD AUTO: 0.2 % (ref 0–0.5)
LYMPHOCYTES # BLD AUTO: 1.26 X10(3)/MCL (ref 1.16–3.74)
LYMPHOCYTES NFR BLD AUTO: 20.7 % (ref 20–55)
MCH RBC QN AUTO: 27.5 PG (ref 27–34)
MCHC RBC AUTO-ENTMCNC: 30.1 G/DL (ref 31–37)
MCV RBC AUTO: 91.2 FL (ref 79–99)
MONOCYTES # BLD AUTO: 0.33 X10(3)/MCL (ref 0.24–0.36)
MONOCYTES NFR BLD AUTO: 5.4 % (ref 4.7–12.5)
NEUTROPHILS # BLD AUTO: 4.33 X10(3)/MCL (ref 1.56–6.13)
NEUTROPHILS NFR BLD AUTO: 71 % (ref 37–73)
PLATELET # BLD AUTO: 179 X10(3)/MCL (ref 140–371)
PMV BLD AUTO: 10.6 FL (ref 9.4–12.4)
POTASSIUM SERPL-SCNC: 3.9 MMOL/L (ref 3.5–5.1)
PROT SERPL-MCNC: 6.7 GM/DL (ref 6.3–8.2)
RBC # BLD AUTO: 4.91 X10(6)/MCL (ref 4–5.1)
SODIUM SERPL-SCNC: 142 MMOL/L (ref 136–145)
TROPONIN I SERPL-MCNC: <0.012 NG/ML (ref 0–0.03)
WBC # BLD AUTO: 6.09 X10(3)/MCL (ref 4–11.5)

## 2025-01-26 PROCEDURE — 93005 ELECTROCARDIOGRAM TRACING: CPT

## 2025-01-26 PROCEDURE — 94640 AIRWAY INHALATION TREATMENT: CPT

## 2025-01-26 PROCEDURE — 96366 THER/PROPH/DIAG IV INF ADDON: CPT

## 2025-01-26 PROCEDURE — 85025 COMPLETE CBC W/AUTO DIFF WBC: CPT | Performed by: FAMILY MEDICINE

## 2025-01-26 PROCEDURE — 94799 UNLISTED PULMONARY SVC/PX: CPT

## 2025-01-26 PROCEDURE — 25000003 PHARM REV CODE 250: Performed by: FAMILY MEDICINE

## 2025-01-26 PROCEDURE — 25000003 PHARM REV CODE 250: Performed by: INTERNAL MEDICINE

## 2025-01-26 PROCEDURE — 96365 THER/PROPH/DIAG IV INF INIT: CPT

## 2025-01-26 PROCEDURE — 83880 ASSAY OF NATRIURETIC PEPTIDE: CPT | Performed by: FAMILY MEDICINE

## 2025-01-26 PROCEDURE — 5A09357 ASSISTANCE WITH RESPIRATORY VENTILATION, LESS THAN 24 CONSECUTIVE HOURS, CONTINUOUS POSITIVE AIRWAY PRESSURE: ICD-10-PCS | Performed by: FAMILY MEDICINE

## 2025-01-26 PROCEDURE — 94660 CPAP INITIATION&MGMT: CPT

## 2025-01-26 PROCEDURE — 99285 EMERGENCY DEPT VISIT HI MDM: CPT | Mod: 25

## 2025-01-26 PROCEDURE — 20000000 HC ICU ROOM

## 2025-01-26 PROCEDURE — 27000190 HC CPAP FULL FACE MASK W/VALVE

## 2025-01-26 PROCEDURE — 96376 TX/PRO/DX INJ SAME DRUG ADON: CPT

## 2025-01-26 PROCEDURE — 99900031 HC PATIENT EDUCATION (STAT)

## 2025-01-26 PROCEDURE — 80053 COMPREHEN METABOLIC PANEL: CPT | Performed by: FAMILY MEDICINE

## 2025-01-26 PROCEDURE — 25000242 PHARM REV CODE 250 ALT 637 W/ HCPCS: Performed by: FAMILY MEDICINE

## 2025-01-26 PROCEDURE — 63600175 PHARM REV CODE 636 W HCPCS: Performed by: FAMILY MEDICINE

## 2025-01-26 PROCEDURE — 94761 N-INVAS EAR/PLS OXIMETRY MLT: CPT

## 2025-01-26 PROCEDURE — 93010 ELECTROCARDIOGRAM REPORT: CPT | Mod: ,,, | Performed by: INTERNAL MEDICINE

## 2025-01-26 PROCEDURE — 99900035 HC TECH TIME PER 15 MIN (STAT)

## 2025-01-26 PROCEDURE — 84484 ASSAY OF TROPONIN QUANT: CPT | Performed by: FAMILY MEDICINE

## 2025-01-26 RX ORDER — METOPROLOL SUCCINATE 25 MG/1
25 TABLET, EXTENDED RELEASE ORAL DAILY
Status: DISCONTINUED | OUTPATIENT
Start: 2025-01-27 | End: 2025-01-27

## 2025-01-26 RX ORDER — MORPHINE SULFATE 2 MG/ML
2 INJECTION, SOLUTION INTRAMUSCULAR; INTRAVENOUS
Status: COMPLETED | OUTPATIENT
Start: 2025-01-26 | End: 2025-01-26

## 2025-01-26 RX ORDER — DILTIAZEM HYDROCHLORIDE 5 MG/ML
20 INJECTION INTRAVENOUS
Status: COMPLETED | OUTPATIENT
Start: 2025-01-26 | End: 2025-01-26

## 2025-01-26 RX ORDER — DIGOXIN 125 MCG
125 TABLET ORAL EVERY OTHER DAY
Status: DISCONTINUED | OUTPATIENT
Start: 2025-01-27 | End: 2025-01-28 | Stop reason: HOSPADM

## 2025-01-26 RX ORDER — DIGOXIN 0.25 MG/ML
INJECTION INTRAMUSCULAR; INTRAVENOUS
Status: DISPENSED
Start: 2025-01-26 | End: 2025-01-27

## 2025-01-26 RX ORDER — NICARDIPINE HYDROCHLORIDE 0.2 MG/ML
2.5 INJECTION INTRAVENOUS CONTINUOUS
Status: DISCONTINUED | OUTPATIENT
Start: 2025-01-26 | End: 2025-01-26

## 2025-01-26 RX ORDER — LORAZEPAM 2 MG/ML
1 INJECTION INTRAMUSCULAR
Status: COMPLETED | OUTPATIENT
Start: 2025-01-26 | End: 2025-01-26

## 2025-01-26 RX ORDER — NICARDIPINE HYDROCHLORIDE 0.2 MG/ML
0-15 INJECTION INTRAVENOUS CONTINUOUS
Status: DISCONTINUED | OUTPATIENT
Start: 2025-01-26 | End: 2025-01-27

## 2025-01-26 RX ORDER — ACETAMINOPHEN 325 MG/1
650 TABLET ORAL NIGHTLY PRN
Status: DISCONTINUED | OUTPATIENT
Start: 2025-01-26 | End: 2025-01-28 | Stop reason: HOSPADM

## 2025-01-26 RX ORDER — SACUBITRIL AND VALSARTAN 49; 51 MG/1; MG/1
1 TABLET, FILM COATED ORAL 2 TIMES DAILY
Status: DISCONTINUED | OUTPATIENT
Start: 2025-01-26 | End: 2025-01-28 | Stop reason: HOSPADM

## 2025-01-26 RX ORDER — TALC
6 POWDER (GRAM) TOPICAL NIGHTLY PRN
Status: DISCONTINUED | OUTPATIENT
Start: 2025-01-26 | End: 2025-01-28 | Stop reason: HOSPADM

## 2025-01-26 RX ORDER — HYDRALAZINE HYDROCHLORIDE 20 MG/ML
20 INJECTION INTRAMUSCULAR; INTRAVENOUS
Status: COMPLETED | OUTPATIENT
Start: 2025-01-26 | End: 2025-01-26

## 2025-01-26 RX ORDER — DIGOXIN 0.25 MG/ML
500 INJECTION INTRAMUSCULAR; INTRAVENOUS
Status: COMPLETED | OUTPATIENT
Start: 2025-01-26 | End: 2025-01-26

## 2025-01-26 RX ORDER — AMIODARONE HYDROCHLORIDE 200 MG/1
200 TABLET ORAL DAILY
Status: DISCONTINUED | OUTPATIENT
Start: 2025-01-27 | End: 2025-01-27

## 2025-01-26 RX ORDER — LEVALBUTEROL INHALATION SOLUTION 0.63 MG/3ML
0.63 SOLUTION RESPIRATORY (INHALATION)
Status: COMPLETED | OUTPATIENT
Start: 2025-01-26 | End: 2025-01-26

## 2025-01-26 RX ORDER — SODIUM CHLORIDE 0.9 % (FLUSH) 0.9 %
10 SYRINGE (ML) INJECTION
Status: DISCONTINUED | OUTPATIENT
Start: 2025-01-26 | End: 2025-01-28 | Stop reason: HOSPADM

## 2025-01-26 RX ORDER — FUROSEMIDE 10 MG/ML
40 INJECTION INTRAMUSCULAR; INTRAVENOUS
Status: COMPLETED | OUTPATIENT
Start: 2025-01-26 | End: 2025-01-26

## 2025-01-26 RX ADMIN — NICARDIPINE HYDROCHLORIDE 5 MG/HR: 0.2 INJECTION INTRAVENOUS at 06:01

## 2025-01-26 RX ADMIN — LORAZEPAM 1 MG: 2 INJECTION, SOLUTION INTRAMUSCULAR; INTRAVENOUS at 06:01

## 2025-01-26 RX ADMIN — MORPHINE SULFATE 2 MG: 2 INJECTION, SOLUTION INTRAMUSCULAR; INTRAVENOUS at 06:01

## 2025-01-26 RX ADMIN — DEXTROSE MONOHYDRATE 15 MG/HR: 50 INJECTION, SOLUTION INTRAVENOUS at 05:01

## 2025-01-26 RX ADMIN — DIGOXIN 500 MCG: 0.25 INJECTION INTRAMUSCULAR; INTRAVENOUS at 05:01

## 2025-01-26 RX ADMIN — FUROSEMIDE 40 MG: 10 INJECTION, SOLUTION INTRAMUSCULAR; INTRAVENOUS at 05:01

## 2025-01-26 RX ADMIN — SACUBITRIL AND VALSARTAN 1 TABLET: 49; 51 TABLET, FILM COATED ORAL at 08:01

## 2025-01-26 RX ADMIN — HYDRALAZINE HYDROCHLORIDE 20 MG: 20 INJECTION INTRAMUSCULAR; INTRAVENOUS at 05:01

## 2025-01-26 RX ADMIN — LEVALBUTEROL HYDROCHLORIDE 0.63 MG: 0.63 SOLUTION RESPIRATORY (INHALATION) at 06:01

## 2025-01-26 RX ADMIN — DILTIAZEM HYDROCHLORIDE 20 MG: 5 INJECTION INTRAVENOUS at 08:01

## 2025-01-26 RX ADMIN — RIVAROXABAN 15 MG: 15 TABLET, FILM COATED ORAL at 05:01

## 2025-01-26 RX ADMIN — DEXTROSE MONOHYDRATE 5 MG/HR: 50 INJECTION, SOLUTION INTRAVENOUS at 10:01

## 2025-01-26 RX ADMIN — ACETAMINOPHEN 650 MG: 325 TABLET, FILM COATED ORAL at 10:01

## 2025-01-26 NOTE — H&P
Hospital Medicine  History & Physical Examination       Patient: Patsy H Cantu  MRN: [id number]  STATUS: Emergency   DOS: 1/26/2025   PCP: Mery Sanon NP      CC: sob       HISTORY OF PRESENT ILLNESS   79 y.o. female with a history that includes PAF, CHF, Pacemaker presented to ED with  c/o worsening sob and palpitations. Found to be in atrial fibrillation with rvr. TX with cardizem bolus with some improvement but requiring cardizem drip for rate control at this time.  consulted for admission.  Family and patient reuqest tranfer to [facility] but request was denied.     REVIEW OF SYSTEMS     Except as documented, all other systems reviewed and negative       PAST MEDICAL HISTORY     Past Medical History:   Diagnosis Date    A-fib     CHF (congestive heart failure)     Fluid retention     Hypercholesterolemia     Hypertension     Pacemaker     Paroxysmal atrial fibrillation           PAST SURGICAL HISTORY     Past Surgical History:   Procedure Laterality Date    HYSTERECTOMY      JOINT REPLACEMENT Right     X2    SHOULDER ARTHROSCOPY Left 8/4/2023    Procedure: ARTHROSCOPY, SHOULDER;  Surgeon: Albert Sood MD;  Location: [facility];  Service: Orthopedics;  Laterality: Left;          FAMILY HISTORY     Reviewed, negative in relation to patient's current condition.      SOCIAL HISTORY     Denies alcohol, tobacco or drug abuse    Social History     Tobacco Use    Smoking status: Never    Smokeless tobacco: Never   Substance Use Topics    Alcohol use: Never          ALLERGIES     Patient has no known allergies.      HOME MEDICATIONS     Current Outpatient Medications   Medication Instructions    amiodarone (PACERONE) 200 MG Tab Daily    digoxin (LANOXIN) 125 mcg, Daily    EScitalopram oxalate (LEXAPRO) 10 MG tablet No dose, route, or frequency recorded.    fenofibrate (TRICOR) 48 mg, Daily    furosemide (LASIX) 40 mg, Oral, 2 times daily    metoprolol succinate (TOPROL-XL) 25 mg, Oral, Daily    omega-3 acid ethyl  "esters (LOVAZA) 1 g, Daily    potassium chloride (KLOR-CON) 10 MEQ TbSR 1 tablet, Daily    sacubitriL-valsartan (ENTRESTO) 49-51 mg per tablet 1 tablet, 2 times daily    XARELTO 20 mg, Nightly          PHYSICAL EXAM   VITALS: T 97.8 °F (36.6 °C)   BP (!) 142/105   P 89   RR (!) 22   O2 (!) 93 %    GENERAL: Awake and in NAD  HEENT: NC/AT, EOMI, PERRL.  NECK: Supple,  No JVD  LUNGS: CTA B/L  CVS: RRR, S1S2 positive  GI/: Soft, NT/ND, bowel sounds positive.  EXTREMITIES: Peripheral pulses 2+, no peripheral edema  DERM: Warm, dry.  No rashes or lesions noted.  NEURO: AAOx3, no focal neurologic deficit  PSYCH: Cooperative, appropriate mood and affect       DIAGNOSTICS     Recent Labs     01/26/25  0833   WBC 6.09   RBC 4.91   HGB 13.5   HCT 44.8   MCV 91.2   MCH 27.5   MCHC 30.1*   RDW 15.7*        No results for input(s): "LACTIC" in the last 72 hours.  No results for input(s): "INR", "APTT", "D-DIMER" in the last 72 hours.  No results for input(s): "HGBA1C", "CHOL", "TRIG", "LDL", "VLDL", "HDL" in the last 72 hours.   Recent Labs     01/26/25  0833      K 3.9   CO2 27   BUN 26*   CREATININE 1.11   GLUCOSE 114   CALCIUM 9.4   ALBUMIN 4.1   GLOBULIN 2.6   ALKPHOS 64   ALT 19   AST 26   BILITOT 0.7     Recent Labs     01/26/25  0833   TROPONINI <0.012          X-Ray Chest AP  Narrative: EXAMINATION:  XR CHEST AP PORTABLE    CLINICAL HISTORY:  Shortness of breath, chest pain.    TECHNIQUE:  1 view of the chest.    COMPARISON:  01/04/2025    FINDINGS:  There are diffuse increased interstitial lung markings compatible with pulmonary vascular congestion/interstitial edema which appears worsened from prior exam..  There is no pneumothorax, lobar consolidation, or significant pleural effusion identified.  Vascular calcifications are present.  Pacing device is present.  The cardiac silhouette is stably prominent.    No acute displaced fracture is seen.  Impression: 1. Pulmonary vascular congestion/interstitial " edema increased from prior exam.    Electronically signed by: Kendell Lopez MD  Date:    01/26/2025  Time:    09:58        ASSESSMENT   Atrial fibrillation with RVR  H/O CHF  Pacemaker    PLAN   Admit to inpatient   [facility] drip  Resume home meds   Consult cardiology    Prophylaxis: xarelto  Code Status: full code      Patient Screened for food insecurity, housing instability, transportation needs, utility difficulties, and interpersonal safety.  No needs identified    Critical care time spent on the evaluation and treatment of severe organ dysfunction, review of pertinent labs and imaging studies, discussions with consulting providers and discussions with patient/family 32 minutes       Ernie Kim MD  [facility] Medicine

## 2025-01-26 NOTE — ED PROVIDER NOTES
Encounter Date: 1/26/2025       History       Chief Complaint  Patient presents with  · Chest Pain  · Shortness of Breath    PRESENTS TO ED PER POV WITH C/O SOB AND CHEST PALPITATIONS ONSET LAST NIGHT. HX OF AFIB. EKG PER EMS aFIB 120-140s          Review of patient's allergies indicates:  No Known Allergies  Past Medical History:   Diagnosis Date    A-fib     CHF (congestive heart failure)     Fluid retention     Hypercholesterolemia     Hypertension     Pacemaker     Paroxysmal atrial fibrillation      Past Surgical History:   Procedure Laterality Date    HYSTERECTOMY      JOINT REPLACEMENT Right     X2    SHOULDER ARTHROSCOPY Left 8/4/2023    Procedure: ARTHROSCOPY, SHOULDER;  Surgeon: Albert Sood MD;  Location: [facility];  Service: Orthopedics;  Laterality: Left;     No family history on file.  Social History     Tobacco Use    Smoking status: Never    Smokeless tobacco: Never   Substance Use Topics    Alcohol use: Never    Drug use: Never     Review of Systems   Constitutional:  Negative for fever.   HENT:  Negative for congestion, dental problem, drooling, ear discharge, ear pain and sore throat.    Eyes:  Negative for photophobia, pain, discharge, redness and itching.   Respiratory:  Negative for shortness of breath.    Cardiovascular:  Negative for chest pain.   Gastrointestinal:  Negative for abdominal distention, abdominal pain, anal bleeding, blood in stool and nausea.   Endocrine: Negative for cold intolerance, heat intolerance, polydipsia and polyphagia.   Genitourinary:  Negative for decreased urine volume, dysuria, flank pain, frequency, urgency and vaginal bleeding.   Musculoskeletal:  Positive for gait problem and myalgias. Negative for back pain and joint swelling.   Skin:  Negative for rash.   Neurological:  Positive for weakness. Negative for dizziness, facial asymmetry, light-headedness and headaches.   Hematological:  Does not bruise/bleed easily.   Psychiatric/Behavioral:  Negative for  agitation, behavioral problems, confusion, decreased concentration, dysphoric mood and hallucinations.        Physical Exam     Initial Vitals [01/26/25 0822]   BP Pulse Resp Temp SpO2   (!) 144/116 (!) 135 20 97.6 °F (36.4 °C) 96 %      MAP       --         Physical Exam    Nursing note and vitals reviewed.  Constitutional: She appears well-developed.   HENT:   Head: Normocephalic and atraumatic.   Eyes: Pupils are equal, round, and reactive to light.   Neck:   Normal range of motion.  Cardiovascular:  Normal heart sounds and intact distal pulses.           + tachycardia and irregular    Pulmonary/Chest: Breath sounds normal. No respiratory distress. She has no wheezes. She has no rhonchi. She has no rales.   Recent scar from pacemaker placement    Abdominal: Abdomen is soft. Bowel sounds are normal. She exhibits no distension. There is no abdominal tenderness. There is no rebound and no guarding.   Musculoskeletal:         General: Normal range of motion.      Cervical back: Normal range of motion.     Neurological: She is alert and oriented to person, place, and time.   Skin: Skin is warm.   Psychiatric: She has a normal mood and affect.         ED Course   Procedures  Labs Reviewed   COMPREHENSIVE METABOLIC PANEL - Abnormal       Result Value    Sodium 142      Potassium 3.9      Chloride 109      CO2 27      Glucose 114      Blood Urea Nitrogen 26 (*)     Creatinine 1.11      Calcium 9.4      Protein Total 6.7      Albumin 4.1      Globulin 2.6      Albumin/Globulin Ratio 1.6      Bilirubin Total 0.7      ALP 64      ALT 19      AST 26      eGFR 51      Anion Gap 6.0      BUN/Creatinine Ratio 23 (*)    CBC WITH DIFFERENTIAL - Abnormal    WBC 6.09      RBC 4.91      Hgb 13.5      Hct 44.8      MCV 91.2      MCH 27.5      MCHC 30.1 (*)     RDW 15.7 (*)     Platelet 179      MPV 10.6      Neut % 71.0      Lymph % 20.7      Mono % 5.4      Eos % 2.5      Basophil % 0.2      Lymph # 1.26      Neut # 4.33      Mono #  0.33      Eos # 0.15      Baso # 0.01      Imm Gran # 0.01      Imm Grans % 0.2     NT-PRO NATRIURETIC PEPTIDE - Abnormal    ProBNP 5,650.0 (*)    TROPONIN I - Normal    Troponin-I <0.012     CBC W/ AUTO DIFFERENTIAL    Narrative:     The following orders were created for panel order CBC auto differential.  Procedure                               Abnormality         Status                     ---------                               -----------         ------                     CBC with Differential[[id number]]       Abnormal            Final result                 Please view results for these tests on the individual orders.     EKG Readings: (Independently Interpreted)   Initial Reading: No STEMI. Rhythm: Atrial Fibrillation. Heart Rate: 116.   Afib with RVR       Imaging Results              X-Ray Chest AP (Final result)  Result time 01/26/25 09:58:57      Final result by Kendell Lopez MD (01/26/25 09:58:57)                   Impression:      1. Pulmonary vascular congestion/interstitial edema increased from prior exam.      Electronically signed by: Kendell Lopez MD  Date:    01/26/2025  Time:    09:58               Narrative:    EXAMINATION:  XR CHEST AP PORTABLE    CLINICAL HISTORY:  Shortness of breath, chest pain.    TECHNIQUE:  1 view of the chest.    COMPARISON:  01/04/2025    FINDINGS:  There are diffuse increased interstitial lung markings compatible with pulmonary vascular congestion/interstitial edema which appears worsened from prior exam..  There is no pneumothorax, lobar consolidation, or significant pleural effusion identified.  Vascular calcifications are present.  Pacing device is present.  The cardiac silhouette is stably prominent.    No acute displaced fracture is seen.                                       Medications   diltiaZEM 100 mg in dextrose 5% 100 mL IVPB (ready to mix) (titrating) (15 mg/hr Intravenous New Bag 1/26/25 1734)   amiodarone tablet 200 mg (has no administration in time  range)   digoxin tablet 125 mcg (has no administration in time range)   metoprolol succinate (TOPROL-XL) 24 hr tablet 25 mg (has no administration in time range)   sacubitriL-valsartan 49-51 mg per tablet 1 tablet (has no administration in time range)   rivaroxaban tablet 15 mg (15 mg Oral Given 1/26/25 1721)   niCARdipine 40 mg/200 mL (0.2 mg/mL) infusion (5 mg/hr Intravenous New Bag 1/26/25 1808)   morphine injection 2 mg (has no administration in time range)   diltiaZEM injection 20 mg (20 mg Intravenous Given 1/26/25 0834)   furosemide injection 40 mg (40 mg Intravenous Given 1/26/25 1707)   digoxin injection 500 mcg (500 mcg Intravenous Given 1/26/25 1741)   hydrALAZINE injection 20 mg (20 mg Intravenous Given 1/26/25 1740)   LORazepam injection 1 mg (1 mg Intravenous Given 1/26/25 1808)   levalbuterol nebulizer solution 0.63 mg (0.63 mg Nebulization Given 1/26/25 1812)     Medical Decision Making  The patient has a atrial fibrillation with a rapid ventricular rate which responded very well to diltiazem we are going to initiate diltiazem drip and control the heart rate below 100 we will continue the anticoagulation and follow with the Cardiology the patient will be admitted to the ICU bed the hospital does not have any bed opening after the shift change there is going to be more beds available talked to Dr. Kim he accepted the patient came to the emergency room and talked with the patient agreed with management and disposition-----around 5:30 p.m. the patient's heart rate went up to 150 we increase the Cardizem drip to 20 mg and we also gave her digoxin because the patient did not take the digoxin this morning I also submitted the consult and talked to Dr. Homan Dr. Homans suggested that as long as the blood pressure is controlled we do not have to cardiovert her Dr. Ling agreed to talk with the patient----the patient was asked if she wanted to be intubated the patient declined for the intubation and said  that she wanted to be DNR----upon getting further history the patient says that about 2 weeks ago she was not in atrial fibrillation at the time of the procedure Dr. Ling decided that it is paroxysmal atrial fibrillation and cardioversion might help we shocked the patient with 200 joules the patient heart rate dropped to 88 the blood pressure was very high we started her on Cardene drip the blood pressure dropped down we will stop the Cardene drip if the blood pressure stays down we will continue with the Cardizem drip and the patient will be transferred to the ICU    Amount and/or Complexity of Data Reviewed  Labs: ordered.  Radiology: ordered.    Risk  Prescription drug management.  Decision regarding hospitalization.                                      Clinical Impression:  Final diagnoses:  [R00.0] Rapid heart beat  [I48.91] Atrial fibrillation, unspecified type (Primary)          ED Disposition Condition    Admit Stable                Charles Busch MD  01/26/25 1520       Charles Busch MD  01/26/25 180       Charles Busch MD  01/26/25 1542

## 2025-01-27 PROBLEM — D64.9 ANEMIA: Status: ACTIVE | Noted: 2025-01-27

## 2025-01-27 LAB
ALBUMIN SERPL-MCNC: 3.5 G/DL (ref 3.4–5)
ALBUMIN/GLOB SERPL: 1.3 RATIO
ALP SERPL-CCNC: 55 UNIT/L (ref 50–144)
ALT SERPL-CCNC: 15 UNIT/L (ref 1–45)
ANION GAP SERPL CALC-SCNC: 6 MEQ/L (ref 2–13)
AST SERPL-CCNC: 23 UNIT/L (ref 14–36)
BASOPHILS # BLD AUTO: 0.02 X10(3)/MCL (ref 0.01–0.08)
BASOPHILS NFR BLD AUTO: 0.3 % (ref 0.1–1.2)
BILIRUB SERPL-MCNC: 1.2 MG/DL (ref 0–1)
BUN SERPL-MCNC: 28 MG/DL (ref 7–20)
CALCIUM SERPL-MCNC: 8.7 MG/DL (ref 8.4–10.2)
CHLORIDE SERPL-SCNC: 108 MMOL/L (ref 98–110)
CO2 SERPL-SCNC: 26 MMOL/L (ref 21–32)
CREAT SERPL-MCNC: 1.38 MG/DL (ref 0.66–1.25)
CREAT/UREA NIT SERPL: 20 (ref 12–20)
EOSINOPHIL # BLD AUTO: 0.02 X10(3)/MCL (ref 0.04–0.36)
EOSINOPHIL NFR BLD AUTO: 0.3 % (ref 0.7–7)
ERYTHROCYTE [DISTWIDTH] IN BLOOD BY AUTOMATED COUNT: 15.7 % (ref 11–14.5)
GFR SERPLBLD CREATININE-BSD FMLA CKD-EPI: 39 ML/MIN/1.73/M2
GLOBULIN SER-MCNC: 2.6 GM/DL (ref 2–3.9)
GLUCOSE SERPL-MCNC: 107 MG/DL (ref 70–115)
HCT VFR BLD AUTO: 38.1 % (ref 36–48)
HGB BLD-MCNC: 11.6 G/DL (ref 11.8–16)
IMM GRANULOCYTES # BLD AUTO: 0.02 X10(3)/MCL (ref 0–0.03)
IMM GRANULOCYTES NFR BLD AUTO: 0.3 % (ref 0–0.5)
LYMPHOCYTES # BLD AUTO: 1.27 X10(3)/MCL (ref 1.16–3.74)
LYMPHOCYTES NFR BLD AUTO: 19 % (ref 20–55)
MCH RBC QN AUTO: 27.5 PG (ref 27–34)
MCHC RBC AUTO-ENTMCNC: 30.4 G/DL (ref 31–37)
MCV RBC AUTO: 90.3 FL (ref 79–99)
MONOCYTES # BLD AUTO: 0.45 X10(3)/MCL (ref 0.24–0.36)
MONOCYTES NFR BLD AUTO: 6.7 % (ref 4.7–12.5)
NEUTROPHILS # BLD AUTO: 4.9 X10(3)/MCL (ref 1.56–6.13)
NEUTROPHILS NFR BLD AUTO: 73.4 % (ref 37–73)
PLATELET # BLD AUTO: 177 X10(3)/MCL (ref 140–371)
PMV BLD AUTO: 10.3 FL (ref 9.4–12.4)
POTASSIUM SERPL-SCNC: 4 MMOL/L (ref 3.5–5.1)
PROT SERPL-MCNC: 6.1 GM/DL (ref 6.3–8.2)
RBC # BLD AUTO: 4.22 X10(6)/MCL (ref 4–5.1)
SODIUM SERPL-SCNC: 140 MMOL/L (ref 136–145)
WBC # BLD AUTO: 6.68 X10(3)/MCL (ref 4–11.5)

## 2025-01-27 PROCEDURE — 94660 CPAP INITIATION&MGMT: CPT

## 2025-01-27 PROCEDURE — 94761 N-INVAS EAR/PLS OXIMETRY MLT: CPT

## 2025-01-27 PROCEDURE — 85025 COMPLETE CBC W/AUTO DIFF WBC: CPT | Performed by: FAMILY MEDICINE

## 2025-01-27 PROCEDURE — 97161 PT EVAL LOW COMPLEX 20 MIN: CPT

## 2025-01-27 PROCEDURE — 27000190 HC CPAP FULL FACE MASK W/VALVE

## 2025-01-27 PROCEDURE — 80053 COMPREHEN METABOLIC PANEL: CPT | Performed by: FAMILY MEDICINE

## 2025-01-27 PROCEDURE — 99900035 HC TECH TIME PER 15 MIN (STAT)

## 2025-01-27 PROCEDURE — 25000003 PHARM REV CODE 250: Performed by: FAMILY MEDICINE

## 2025-01-27 PROCEDURE — 20000000 HC ICU ROOM

## 2025-01-27 PROCEDURE — 94799 UNLISTED PULMONARY SVC/PX: CPT

## 2025-01-27 PROCEDURE — 27100171 HC OXYGEN HIGH FLOW UP TO 24 HOURS

## 2025-01-27 RX ORDER — MUPIROCIN 20 MG/G
OINTMENT TOPICAL 2 TIMES DAILY
Status: DISCONTINUED | OUTPATIENT
Start: 2025-01-27 | End: 2025-01-28 | Stop reason: HOSPADM

## 2025-01-27 RX ORDER — METOPROLOL SUCCINATE 50 MG/1
50 TABLET, EXTENDED RELEASE ORAL DAILY
Status: DISCONTINUED | OUTPATIENT
Start: 2025-01-27 | End: 2025-01-28 | Stop reason: HOSPADM

## 2025-01-27 RX ORDER — METOPROLOL SUCCINATE 50 MG/1
50 TABLET, EXTENDED RELEASE ORAL DAILY
Status: DISCONTINUED | OUTPATIENT
Start: 2025-01-28 | End: 2025-01-27

## 2025-01-27 RX ORDER — AMIODARONE HYDROCHLORIDE 200 MG/1
200 TABLET ORAL 2 TIMES DAILY
Status: DISCONTINUED | OUTPATIENT
Start: 2025-01-27 | End: 2025-01-27

## 2025-01-27 RX ORDER — AMIODARONE HYDROCHLORIDE 200 MG/1
200 TABLET ORAL 2 TIMES DAILY
Status: DISCONTINUED | OUTPATIENT
Start: 2025-01-27 | End: 2025-01-28 | Stop reason: HOSPADM

## 2025-01-27 RX ADMIN — SACUBITRIL AND VALSARTAN 1 TABLET: 49; 51 TABLET, FILM COATED ORAL at 09:01

## 2025-01-27 RX ADMIN — METOPROLOL SUCCINATE 50 MG: 50 TABLET, FILM COATED, EXTENDED RELEASE ORAL at 10:01

## 2025-01-27 RX ADMIN — MUPIROCIN: 20 OINTMENT TOPICAL at 09:01

## 2025-01-27 RX ADMIN — DEXTROSE MONOHYDRATE 5 MG/HR: 50 INJECTION, SOLUTION INTRAVENOUS at 04:01

## 2025-01-27 RX ADMIN — MUPIROCIN: 20 OINTMENT TOPICAL at 10:01

## 2025-01-27 RX ADMIN — RIVAROXABAN 15 MG: 15 TABLET, FILM COATED ORAL at 04:01

## 2025-01-27 RX ADMIN — DIGOXIN 125 MCG: 125 TABLET ORAL at 10:01

## 2025-01-27 RX ADMIN — AMIODARONE HYDROCHLORIDE 200 MG: 200 TABLET ORAL at 09:01

## 2025-01-27 RX ADMIN — SACUBITRIL AND VALSARTAN 1 TABLET: 49; 51 TABLET, FILM COATED ORAL at 10:01

## 2025-01-27 RX ADMIN — AMIODARONE HYDROCHLORIDE 200 MG: 200 TABLET ORAL at 10:01

## 2025-01-27 NOTE — ASSESSMENT & PLAN NOTE
Anemia is likely due to Iron deficiency. Most recent hemoglobin and hematocrit are listed below.  Recent Labs     01/26/25  0833 01/27/25  0344   HGB 13.5 11.6*   HCT 44.8 38.1     Plan  - Monitor serial CBC: Daily  - Transfuse PRBC if patient becomes hemodynamically unstable, symptomatic or H/H drops below 7/21.  - Patient has not received any PRBC transfusions to date  - Patient's anemia is currently stable  -

## 2025-01-27 NOTE — NURSING
Pt admitted to ICU 3 from ER via stretcher with no distress noted. Tenorio in place and ER nurse, Rukhsana states she just emptied 400 cc of urine. Paced rhythm at 70 noted on the monitor. Cardizem gtt at 5 mg/hr and nurse states that MD wants to continue it at this rate until the morning when they can start her on po cardizem. Pt is SOB with any exertion. No complaints at this time and states she feels so much better. Oriented to unit and room. Verbalizes understanding. Call bell in reach and bed alarm on. Will continue to monitor closely.

## 2025-01-27 NOTE — ED NOTES
SYNC CARDIOVERSION DONE AT BEDSIDE PER TOM FLORES NURSE, YEFRI FUENTES RN, AND GIANLUCA UPTON, LG. CONSENTS SIGNED PRIOR TO EVENT. MEDICATED PER MD ORDERS. PATIENT SHOCKED @ 200J SYNC. RHYTHM ANALYZED AND REPEAT EKG IN PROGRESS. AV PACED RHYTHM NOTED ON MONITOR RATE 88. NAD NOTED

## 2025-01-27 NOTE — NURSING
Spoke with daughter, Margarette, about pt's belongings.  She states that she took them all home and will bring her glasses and cellphone back at visiting hours tomorrow.

## 2025-01-27 NOTE — PLAN OF CARE
01/27/25 1412   Discharge Assessment   Assessment Type Discharge Planning Assessment   Confirmed/corrected address, phone number and insurance Yes   Confirmed Demographics Correct on Facesheet   Source of Information patient;health record   When was your last doctors appointment? 01/09/25   Communicated DOLLY with patient/caregiver Yes   Reason For Admission AFib with RVR   People in Home alone   Facility Arrived From: Home   Do you expect to return to your current living situation? Yes   Prior to hospitilization cognitive status: Alert/Oriented   Current cognitive status: Alert/Oriented   Walking or Climbing Stairs Difficulty yes   Walking or Climbing Stairs ambulation difficulty, requires equipment   Mobility Management rollator   Dressing/Bathing Difficulty no   Equipment Currently Used at Home rollator;bath bench   Readmission within 30 days? Yes  ([facility] 1/3/25-1/5/25  CHF)   Patient currently being followed by outpatient case management? No   Do you currently have service(s) that help you manage your care at home? Yes   Name and Contact number of agency [facility]   Is the pt/caregiver preference to resume services with current agency Yes   Do you take prescription medications? Yes   Do you have prescription coverage? Yes   Coverage [employer] Managed MCR   Do you have any problems affording any of your prescribed medications? No   Is the patient taking medications as prescribed? yes   Who is going to help you get home at discharge? Family   How do you get to doctors appointments? family or friend will provide   Are you on dialysis? No   Do you take coumadin? No   Discharge Plan A Home Health   Discharge Plan B Skilled Nursing Facility   DME Needed Upon Discharge  none   Discharge Plan discussed with: Patient   Transition of Care Barriers None

## 2025-01-27 NOTE — PLAN OF CARE
Problem: Adult Inpatient Plan of Care  Goal: Plan of Care Review  Outcome: Progressing  Goal: Patient-Specific Goal (Individualized)  Outcome: Progressing  Goal: Absence of Hospital-Acquired Illness or Injury  Outcome: Progressing  Goal: Optimal Comfort and Wellbeing  Outcome: Progressing  Goal: Readiness for Transition of Care  Outcome: Progressing     Problem: Infection  Goal: Absence of Infection Signs and Symptoms  Outcome: Progressing  Intervention: Prevent or Manage Infection  Flowsheets (Taken 1/26/2025 2132)  Infection Management: aseptic technique maintained     Problem: Dysrhythmia  Goal: Normalized Cardiac Rhythm  Reactivated

## 2025-01-27 NOTE — ASSESSMENT & PLAN NOTE
Patient has paroxysmal (<7 days) atrial fibrillation. Patient is currently in atrial fibrillation. [id number] Score: 3. The patients heart rate in the last 24 hours is as follows:  Pulse  Min: 63  Max: 155     Antiarrhythmics  diltiaZEM 100 mg in dextrose 5% 100 mL IVPB (ready to mix) (titrating), Continuous, Intravenous  amiodarone tablet 200 mg, 2 times daily, Oral  metoprolol succinate (TOPROL-XL) 24 hr tablet 50 mg, Daily, Oral    Anticoagulants  rivaroxaban tablet 15 mg, With dinner, Oral    Plan  - Replete lytes with a goal of K>4, Mg >2  - Patient is anticoagulated, see medications listed above.  - Patient's afib is currently controlled  -

## 2025-01-27 NOTE — ASSESSMENT & PLAN NOTE
Patient's blood pressure range in the last 24 hours was: BP  Min: 104/68  Max: 195/141.The patient's inpatient anti-hypertensive regimen is listed below:  Current Antihypertensives  diltiaZEM 100 mg in dextrose 5% 100 mL IVPB (ready to mix) (titrating), Continuous, Intravenous  niCARdipine 40 mg/200 mL (0.2 mg/mL) infusion, Continuous, Intravenous  metoprolol succinate (TOPROL-XL) 24 hr tablet 50 mg, Daily, Oral    Plan  - BP is controlled, no changes needed to their regimen  -

## 2025-01-27 NOTE — ASSESSMENT & PLAN NOTE
"Patient has Combined Systolic and Diastolic heart failure that is Acute on chronic. On presentation their CHF was well compensated. Most recent BNP and echo results are listed below.  No results for input(s): "BNP" in the last 72 hours.  Latest ECHO  Results for orders placed during the hospital encounter of 05/02/24    Echo Saline Bubble? No; Ultrasound enhancing contrast? Yes    Interpretation Summary    Left Ventricle: The left ventricle is normal in size. There is moderately reduced systolic function with a visually estimated ejection fraction of 35%.  The mid to distal anteroseptum appears hypokinetic.  There is diastolic dysfunction.    Right Ventricle: Normal right ventricular cavity size. Systolic function is mildly reduced.    Aortic Valve: There is mild aortic valve sclerosis. Mildly restricted motion. There is mild aortic regurgitation.    Mitral Valve: There is mild (1+) regurgitation.    Tricuspid Valve: There is mild to moderate (1-2+) regurgitation.    Pulmonic Valve: There is no stenosis. There is mild (1+) regurgitation.    The estimated pulmonary artery systolic pressure is 27 mmHg.    AICD/pacemaker lead noted.    Current Heart Failure Medications  digoxin tablet 125 mcg, Every other day, Oral  sacubitriL-valsartan 49-51 mg per tablet 1 tablet, 2 times daily, Oral  metoprolol succinate (TOPROL-XL) 24 hr tablet 50 mg, Daily, Oral    Plan  - Monitor strict I&Os and daily weights.    - Place on telemetry  - Low sodium diet  - Place on fluid restriction of 1.5 L.   - Cardiology has been consulted  - The patient's volume status is at their baseline  -        "

## 2025-01-27 NOTE — SUBJECTIVE & OBJECTIVE
Interval History:      Review of Systems   Constitutional:  Negative for activity change, appetite change and fever.   Respiratory:  Negative for chest tightness, shortness of breath and wheezing.    Cardiovascular:  Negative for chest pain.   Gastrointestinal:  Negative for abdominal pain, constipation, diarrhea, nausea and vomiting.   Genitourinary:  Negative for dysuria.   Skin:  Negative for rash and wound.   Neurological:  Negative for tremors and headaches.     Objective:     Vital Signs (Most Recent):  Temp: 97.8 °F (36.6 °C) (01/27/25 0705)  Pulse: 75 (01/27/25 0735)  Resp: (!) 26 (01/27/25 0735)  BP: 123/71 (01/27/25 0705)  SpO2: (!) 94 % (01/27/25 0735) Vital Signs (24h Range):  Temp:  [97.7 °F (36.5 °C)-99.9 °F (37.7 °C)] 97.8 °F (36.6 °C)  Pulse:  [] 75  Resp:  [16-46] 26  SpO2:  [91 %-100 %] 94 %  BP: (104-195)/() 123/71     Weight: 88 kg (194 lb 0.1 oz)  Body mass index is 33.3 kg/m².    Intake/Output Summary (Last 24 hours) at 1/27/2025 1003  Last data filed at 1/27/2025 0830  Gross per 24 hour   Intake 375 ml   Output 650 ml   Net -275 ml         Physical Exam  Vitals and nursing note reviewed. Exam conducted with a chaperone present.   Constitutional:       General: She is not in acute distress.     Appearance: Normal appearance. She is normal weight. She is not ill-appearing.   HENT:      Head: Normocephalic and atraumatic.   Eyes:      General: No scleral icterus.     Extraocular Movements: Extraocular movements intact.   Cardiovascular:      Rate and Rhythm: Normal rate and regular rhythm.      Pulses: Normal pulses.      Heart sounds: Normal heart sounds.   Pulmonary:      Effort: Pulmonary effort is normal.      Breath sounds: Normal breath sounds.   Abdominal:      Palpations: Abdomen is soft.   Musculoskeletal:         General: No deformity.      Right lower leg: No edema.      Left lower leg: No edema.   Skin:     General: Skin is warm and dry.      Capillary Refill: Capillary  refill takes less than 2 seconds.      Findings: No erythema, lesion or rash.   Neurological:      General: No focal deficit present.      Mental Status: She is alert and oriented to person, place, and time.   Psychiatric:         Mood and Affect: Mood normal.         Behavior: Behavior normal.         Thought Content: Thought content normal.             Significant Labs: All pertinent labs within the past 24 hours have been reviewed.  CBC:   Recent Labs   Lab 01/26/25  0833 01/27/25  0344   WBC 6.09 6.68   HGB 13.5 11.6*   HCT 44.8 38.1    177     CMP:   Recent Labs   Lab 01/26/25  0833 01/27/25  0344    140   K 3.9 4.0    108   CO2 27 26   BUN 26* 28*   CREATININE 1.11 1.38*   CALCIUM 9.4 8.7   ALBUMIN 4.1 3.5   BILITOT 0.7 1.2*   ALKPHOS 64 55   AST 26 23   ALT 19 15       Significant Imaging: I have reviewed all pertinent imaging results/findings within the past 24 hours.

## 2025-01-27 NOTE — PROGRESS NOTES
[facility] Medicine  Progress Note    Patient Name: Patsy H Cantu  MRN: [id number]  Patient Class: IP- Inpatient   Admission Date: 1/26/2025  Length of Stay: 1 days  Attending Physician: Ernie Kim MD  Primary Care Provider: Mery Sanon NP        Subjective     Principal Problem:Atrial fibrillation with rapid ventricular response        HPI:  79 y.o. female with a history that includes PAF, CHF, Pacemaker presented to ED with  c/o worsening sob and palpitations. Found to be in atrial fibrillation with rvr. TX with cardizem bolus with some improvement but requiring cardizem drip for rate control at this time. HM consulted for admission.  Family and patient reuqest tranfer to [facility] [place] but request was denied    Overview/Hospital Course:  01/27/2025 pt presented to [facility] with afib with RVR cardioverted in ER.  Recently had generator change and was here at [facility] d/c 01/05/2025 with CHF exacerbation.  Known to be in afib since the generator change and is waiting on EP appointment.  [facility] saw pt this am recommended med adjustments, continue in ICU overnight and if no recurrence of afib witll d/c home in am.  Family will check with Dr. Demond Mckeon's office on the EP appointment    Interval History:      Review of Systems   Constitutional:  Negative for activity change, appetite change and fever.   Respiratory:  Negative for chest tightness, shortness of breath and wheezing.    Cardiovascular:  Negative for chest pain.   Gastrointestinal:  Negative for abdominal pain, constipation, diarrhea, nausea and vomiting.   Genitourinary:  Negative for dysuria.   Skin:  Negative for rash and wound.   Neurological:  Negative for tremors and headaches.     Objective:     Vital Signs (Most Recent):  Temp: 97.8 °F (36.6 °C) (01/27/25 0705)  Pulse: 75 (01/27/25 0735)  Resp: (!) 26 (01/27/25 0735)  BP: 123/71 (01/27/25 0705)  SpO2: (!) 94 % (01/27/25 0735) Vital Signs (24h Range):  Temp:  [97.7 °F (36.5  °C)-99.9 °F (37.7 °C)] 97.8 °F (36.6 °C)  Pulse:  [] 75  Resp:  [16-46] 26  SpO2:  [91 %-100 %] 94 %  BP: (104-195)/() 123/71     Weight: 88 kg (194 lb 0.1 oz)  Body mass index is 33.3 kg/m².    Intake/Output Summary (Last 24 hours) at 1/27/2025 1003  Last data filed at 1/27/2025 0830  Gross per 24 hour   Intake 375 ml   Output 650 ml   Net -275 ml         Physical Exam  Vitals and nursing note reviewed. Exam conducted with a chaperone present.   Constitutional:       General: She is not in acute distress.     Appearance: Normal appearance. She is normal weight. She is not ill-appearing.   HENT:      Head: Normocephalic and atraumatic.   Eyes:      General: No scleral icterus.     Extraocular Movements: Extraocular movements intact.   Cardiovascular:      Rate and Rhythm: Normal rate and regular rhythm.      Pulses: Normal pulses.      Heart sounds: Normal heart sounds.   Pulmonary:      Effort: Pulmonary effort is normal.      Breath sounds: Normal breath sounds.   Abdominal:      Palpations: Abdomen is soft.   Musculoskeletal:         General: No deformity.      Right lower leg: No edema.      Left lower leg: No edema.   Skin:     General: Skin is warm and dry.      Capillary Refill: Capillary refill takes less than 2 seconds.      Findings: No erythema, lesion or rash.   Neurological:      General: No focal deficit present.      Mental Status: She is alert and oriented to person, place, and time.   Psychiatric:         Mood and Affect: Mood normal.         Behavior: Behavior normal.         Thought Content: Thought content normal.             Significant Labs: All pertinent labs within the past 24 hours have been reviewed.  CBC:   Recent Labs   Lab 01/26/25  0833 01/27/25  0344   WBC 6.09 6.68   HGB 13.5 11.6*   HCT 44.8 38.1    177     CMP:   Recent Labs   Lab 01/26/25  0833 01/27/25  0344    140   K 3.9 4.0    108   CO2 27 26   BUN 26* 28*   CREATININE 1.11 1.38*   CALCIUM 9.4 8.7  "  ALBUMIN 4.1 3.5   BILITOT 0.7 1.2*   ALKPHOS 64 55   AST 26 23   ALT 19 15       Significant Imaging: I have reviewed all pertinent imaging results/findings within the past 24 hours.    Assessment and Plan     * Atrial fibrillation with rapid ventricular response  Patient has paroxysmal (<7 days) atrial fibrillation. Patient is currently in atrial fibrillation. [id number] Score: 3. The patients heart rate in the last 24 hours is as follows:  Pulse  Min: 63  Max: 155     Antiarrhythmics  diltiaZEM 100 mg in dextrose 5% 100 mL IVPB (ready to mix) (titrating), Continuous, Intravenous  amiodarone tablet 200 mg, 2 times daily, Oral  metoprolol succinate (TOPROL-XL) 24 hr tablet 50 mg, Daily, Oral    Anticoagulants  rivaroxaban tablet 15 mg, With dinner, Oral    Plan  - Replete lytes with a goal of K>4, Mg >2  - Patient is anticoagulated, see medications listed above.  - Patient's afib is currently controlled  -          Acute on chronic combined systolic and diastolic congestive heart failure  Patient has Combined Systolic and Diastolic heart failure that is Acute on chronic. On presentation their CHF was well compensated. Most recent BNP and echo results are listed below.  No results for input(s): "BNP" in the last 72 hours.  Latest ECHO  Results for orders placed during the hospital encounter of 05/02/24    Echo Saline Bubble? No; Ultrasound enhancing contrast? Yes    Interpretation Summary    Left Ventricle: The left ventricle is normal in size. There is moderately reduced systolic function with a visually estimated ejection fraction of 35%.  The mid to distal anteroseptum appears hypokinetic.  There is diastolic dysfunction.    Right Ventricle: Normal right ventricular cavity size. Systolic function is mildly reduced.    Aortic Valve: There is mild aortic valve sclerosis. Mildly restricted motion. There is mild aortic regurgitation.    Mitral Valve: There is mild (1+) regurgitation.    Tricuspid Valve: There is " mild to moderate (1-2+) regurgitation.    Pulmonic Valve: There is no stenosis. There is mild (1+) regurgitation.    The estimated pulmonary artery systolic pressure is 27 mmHg.    AICD/pacemaker lead noted.    Current Heart Failure Medications  digoxin tablet 125 mcg, Every other day, Oral  sacubitriL-valsartan 49-51 mg per tablet 1 tablet, 2 times daily, Oral  metoprolol succinate (TOPROL-XL) 24 hr tablet 50 mg, Daily, Oral    Plan  - Monitor strict I&Os and daily weights.    - Place on telemetry  - Low sodium diet  - Place on fluid restriction of 1.5 L.   - Cardiology has been consulted  - The patient's volume status is at their baseline  -          Anemia  Anemia is likely due to Iron deficiency. Most recent hemoglobin and hematocrit are listed below.  Recent Labs     01/26/25  0833 01/27/25  0344   HGB 13.5 11.6*   HCT 44.8 38.1     Plan  - Monitor serial CBC: Daily  - Transfuse PRBC if patient becomes hemodynamically unstable, symptomatic or H/H drops below 7/21.  - Patient has not received any PRBC transfusions to date  - Patient's anemia is currently stable  -      Primary hypertension  Patient's blood pressure range in the last 24 hours was: BP  Min: 104/68  Max: 195/141.The patient's inpatient anti-hypertensive regimen is listed below:  Current Antihypertensives  diltiaZEM 100 mg in dextrose 5% 100 mL IVPB (ready to mix) (titrating), Continuous, Intravenous  niCARdipine 40 mg/200 mL (0.2 mg/mL) infusion, Continuous, Intravenous  metoprolol succinate (TOPROL-XL) 24 hr tablet 50 mg, Daily, Oral    Plan  - BP is controlled, no changes needed to their regimen  -        VTE Risk Mitigation (From admission, onward)           Ordered     IP VTE HIGH RISK PATIENT  Once         01/26/25 2026     Place sequential compression device  Until discontinued         01/26/25 2026     rivaroxaban tablet 15 mg  With dinner         01/26/25 1607                    Discharge Planning   DOLLY:      Code Status: DNR   Medical  Readiness for Discharge Date:                Critical care time spent on the evaluation and treatment of severe organ dysfunction, review of pertinent labs and imaging studies, discussions with consulting providers and discussions with patient/family 37 minutes              Elisha oMya MD  Department of Hospital Medicine   [facility]

## 2025-01-27 NOTE — PLAN OF CARE
Problem: Physical Therapy  Goal: Physical Therapy Goal  Description: Goals to be met by: discharge     Patient will increase functional independence with mobility by performin. Supine to sit with Stand-by Assistance  2. Sit to stand transfer with Stand-by Assistance  3. Gait  x 150 feet with Stand-by Assistance using Rolling Walker.     Outcome: Progressing

## 2025-01-27 NOTE — CONSULTS
[facility]  Cardiology  Consult Note    Patient Name: Patsy H Cantu  MRN: [id number]  Admission Date: 1/26/2025  Hospital Length of Stay: 1 days  Code Status: DNR   Attending Provider: Ernie Kim MD   Consulting Provider: DENIZ Peterson  Primary Care Physician: Mery Sanon NP  Principal Problem:Atrial fibrillation with rapid ventricular response    Patient information was obtained from patient, relative(s), past medical records, ER records, and primary team.     Subjective:     Chief Complaint:  Atrial fib, RVR    HPI: 78yo morbidly obese F w/ PAF, chronic diastolic HF, htn, HL, anxiety presented to the ER with dyspnea and weight gain. She had gained 3# last week and was on a double dose of lasix for 3 days. She didn't lose any of the weight. Yesterday she was at her granddaughter's 1 year birthday until approx 8PM and was doing fine but called her daughter this morning c/o dyspnea and was brought to the ER. She was found to be in AF RVR and treatment was initiated. Approx 6-8 hours after admission she worsened with tachycardia, hypertension, and failure to maintain O2 sat on O2 per NC and was put on bipap. [facility] was then consulted for assistance with AF RVR. Her daughter at bedside provided most of the history.  Pt had a pacemaker gen change last month and was in AF at that time. She was referred for AF ablation but haven't heard from EP yet for appointment. At 2 week post-PPM check she was noted to be in SR.   She had a LHC in December 2024 and was told she had normal coronary arteries.  Her Cardiologist is Dr. Mckeon and [facility].  She is DNR/DNI code status and I reiterated this with both patient and her family and the ER doc.      1/27: Seen by telecardiology yesterday. DCCV in ER.   VSS, NAD. Denies CP or SOB. Maintaining SR and remains on Cardizem gtt. Daughter at bedside. Hx PAF. Patient of Dr Mckeon. Recent generator change (was DC in afib and referred to EP, has  not heard back)      Previous cardiac testing:  Results for orders placed during the hospital encounter of 05/02/24    Echo Saline Bubble? No; Ultrasound enhancing contrast? Yes    Interpretation Summary    Left Ventricle: The left ventricle is normal in size. There is moderately reduced systolic function with a visually estimated ejection fraction of 35%.  The mid to distal anteroseptum appears hypokinetic.  There is diastolic dysfunction.    Right Ventricle: Normal right ventricular cavity size. Systolic function is mildly reduced.    Aortic Valve: There is mild aortic valve sclerosis. Mildly restricted motion. There is mild aortic regurgitation.    Mitral Valve: There is mild (1+) regurgitation.    Tricuspid Valve: There is mild to moderate (1-2+) regurgitation.    Pulmonic Valve: There is no stenosis. There is mild (1+) regurgitation.    The estimated pulmonary artery systolic pressure is 27 mmHg.    AICD/pacemaker lead noted.           Past Medical History:   Diagnosis Date    A-fib     CHF (congestive heart failure)     Fluid retention     Hypercholesterolemia     Hypertension     Pacemaker     Paroxysmal atrial fibrillation        Past Surgical History:   Procedure Laterality Date    HYSTERECTOMY      JOINT REPLACEMENT Right     X2    SHOULDER ARTHROSCOPY Left 8/4/2023    Procedure: ARTHROSCOPY, SHOULDER;  Surgeon: Albert Sood MD;  Location: [facility];  Service: Orthopedics;  Laterality: Left;       Review of patient's allergies indicates:  No Known Allergies    No current facility-administered medications on file prior to encounter.     Current Outpatient Medications on File Prior to Encounter   Medication Sig    amiodarone (PACERONE) 200 MG Tab Take by mouth once daily.    digoxin (LANOXIN) 125 mcg tablet Take 125 mcg by mouth every other day.    EScitalopram oxalate (LEXAPRO) 10 MG tablet     fenofibrate (TRICOR) 48 MG tablet Take 48 mg by mouth once daily.    furosemide (LASIX) 40 MG tablet Take 1  tablet (40 mg total) by mouth 2 (two) times a day. (Patient taking differently: Take 40 mg by mouth 2 (two) times daily.)    metoprolol succinate (TOPROL-XL) 25 MG 24 hr tablet Take 1 tablet (25 mg total) by mouth once daily. (Patient taking differently: Take 25 mg by mouth nightly.)    omega-3 acid ethyl esters (LOVAZA) 1 gram capsule Take 1 g by mouth once daily.    potassium chloride (KLOR-CON) 10 MEQ TbSR 1 tablet once daily.    sacubitriL-valsartan (ENTRESTO) 49-51 mg per tablet Take 1 tablet by mouth 2 (two) times daily.    XARELTO 20 mg Tab Take 20 mg by mouth nightly.     Family History    None       Tobacco Use    Smoking status: Never    Smokeless tobacco: Never   Substance and Sexual Activity    Alcohol use: Never    Drug use: Never    Sexual activity: Not Currently       Review of Systems   Constitutional:  Positive for fatigue.   Respiratory: Negative.     Cardiovascular: Negative.    Gastrointestinal: Negative.    Neurological: Negative.        Objective:     Vital Signs (Most Recent):  Temp: 97.8 °F (36.6 °C) (01/27/25 0705)  Pulse: 75 (01/27/25 0735)  Resp: (!) 26 (01/27/25 0735)  BP: 123/71 (01/27/25 0705)  SpO2: (!) 94 % (01/27/25 0735) Vital Signs (24h Range):  Temp:  [97.7 °F (36.5 °C)-99.9 °F (37.7 °C)] 97.8 °F (36.6 °C)  Pulse:  [] 75  Resp:  [16-46] 26  SpO2:  [91 %-100 %] 94 %  BP: (104-195)/() 123/71     Weight: 88 kg (194 lb 0.1 oz)  Body mass index is 33.3 kg/m².    SpO2: (!) 94 %         Intake/Output Summary (Last 24 hours) at 1/27/2025 1013  Last data filed at 1/27/2025 0830  Gross per 24 hour   Intake 375 ml   Output 650 ml   Net -275 ml       Lines/Drains/Airways       Drain  Duration                  Urethral Catheter 01/26/25 1750 16 Fr. <1 day              Peripheral Intravenous Line  Duration                  Peripheral IV - Single Lumen 20 G Anterior;Left Forearm -- days         Peripheral IV - Single Lumen 01/26/25 1739 22 G Anterior;Right Forearm <1 day                     Significant Labs:  Recent Results (from the past 72 hours)   Comprehensive metabolic panel    Collection Time: 01/26/25  8:33 AM   Result Value Ref Range    Sodium 142 136 - 145 mmol/L    Potassium 3.9 3.5 - 5.1 mmol/L    Chloride 109 98 - 110 mmol/L    CO2 27 21 - 32 mmol/L    Glucose 114 70 - 115 mg/dL    Blood Urea Nitrogen 26 (H) 7.0 - 20.0 mg/dL    Creatinine 1.11 0.66 - 1.25 mg/dL    Calcium 9.4 8.4 - 10.2 mg/dL    Protein Total 6.7 6.3 - 8.2 gm/dL    Albumin 4.1 3.4 - 5.0 g/dL    Globulin 2.6 2.0 - 3.9 gm/dL    Albumin/Globulin Ratio 1.6 ratio    Bilirubin Total 0.7 0.0 - 1.0 mg/dL    ALP 64 50 - 144 unit/L    ALT 19 1 - 45 unit/L    AST 26 14 - 36 unit/L    eGFR 51 mL/min/1.73/m2    Anion Gap 6.0 2.0 - 13.0 mEq/L    BUN/Creatinine Ratio 23 (H) 12 - 20   Troponin I    Collection Time: 01/26/25  8:33 AM   Result Value Ref Range    Troponin-I <0.012 0.000 - 0.034 ng/mL   CBC with Differential    Collection Time: 01/26/25  8:33 AM   Result Value Ref Range    WBC 6.09 4.00 - 11.50 x10(3)/mcL    RBC 4.91 4.00 - 5.10 x10(6)/mcL    Hgb 13.5 11.8 - 16.0 g/dL    Hct 44.8 36.0 - 48.0 %    MCV 91.2 79.0 - 99.0 fL    MCH 27.5 27.0 - 34.0 pg    MCHC 30.1 (L) 31.0 - 37.0 g/dL    RDW 15.7 (H) 11.0 - 14.5 %    Platelet 179 140 - 371 x10(3)/mcL    MPV 10.6 9.4 - 12.4 fL    Neut % 71.0 37 - 73 %    Lymph % 20.7 20 - 55 %    Mono % 5.4 4.7 - 12.5 %    Eos % 2.5 0.7 - 7 %    Basophil % 0.2 0.1 - 1.2 %    Lymph # 1.26 1.16 - 3.74 x10(3)/mcL    Neut # 4.33 1.56 - 6.13 x10(3)/mcL    Mono # 0.33 0.24 - 0.36 x10(3)/mcL    Eos # 0.15 0.04 - 0.36 x10(3)/mcL    Baso # 0.01 0.01 - 0.08 x10(3)/mcL    Imm Gran # 0.01 0.00 - 0.03 x10(3)/mcL    Imm Grans % 0.2 0 - 0.5 %   NT-Pro Natriuretic Peptide    Collection Time: 01/26/25  8:33 AM   Result Value Ref Range    ProBNP 5,650.0 (H) 0.0 - 124.9 PG/ML   Comprehensive metabolic panel    Collection Time: 01/27/25  3:44 AM   Result Value Ref Range    Sodium 140 136 - 145 mmol/L    Potassium  4.0 3.5 - 5.1 mmol/L    Chloride 108 98 - 110 mmol/L    CO2 26 21 - 32 mmol/L    Glucose 107 70 - 115 mg/dL    Blood Urea Nitrogen 28 (H) 7.0 - 20.0 mg/dL    Creatinine 1.38 (H) 0.66 - 1.25 mg/dL    Calcium 8.7 8.4 - 10.2 mg/dL    Protein Total 6.1 (L) 6.3 - 8.2 gm/dL    Albumin 3.5 3.4 - 5.0 g/dL    Globulin 2.6 2.0 - 3.9 gm/dL    Albumin/Globulin Ratio 1.3 ratio    Bilirubin Total 1.2 (H) 0.0 - 1.0 mg/dL    ALP 55 50 - 144 unit/L    ALT 15 1 - 45 unit/L    AST 23 14 - 36 unit/L    eGFR 39 mL/min/1.73/m2    Anion Gap 6.0 2.0 - 13.0 mEq/L    BUN/Creatinine Ratio 20 12 - 20   CBC with Differential    Collection Time: 01/27/25  3:44 AM   Result Value Ref Range    WBC 6.68 4.00 - 11.50 x10(3)/mcL    RBC 4.22 4.00 - 5.10 x10(6)/mcL    Hgb 11.6 (L) 11.8 - 16.0 g/dL    Hct 38.1 36.0 - 48.0 %    MCV 90.3 79.0 - 99.0 fL    MCH 27.5 27.0 - 34.0 pg    MCHC 30.4 (L) 31.0 - 37.0 g/dL    RDW 15.7 (H) 11.0 - 14.5 %    Platelet 177 140 - 371 x10(3)/mcL    MPV 10.3 9.4 - 12.4 fL    Neut % 73.4 (H) 37 - 73 %    Lymph % 19.0 (L) 20 - 55 %    Mono % 6.7 4.7 - 12.5 %    Eos % 0.3 (L) 0.7 - 7 %    Basophil % 0.3 0.1 - 1.2 %    Lymph # 1.27 1.16 - 3.74 x10(3)/mcL    Neut # 4.90 1.56 - 6.13 x10(3)/mcL    Mono # 0.45 (H) 0.24 - 0.36 x10(3)/mcL    Eos # 0.02 (L) 0.04 - 0.36 x10(3)/mcL    Baso # 0.02 0.01 - 0.08 x10(3)/mcL    Imm Gran # 0.02 0.00 - 0.03 x10(3)/mcL    Imm Grans % 0.3 0 - 0.5 %       Significant Imaging:  Imaging Results              X-Ray Chest AP (Final result)  Result time 01/26/25 09:58:57      Final result by Kendell Lopez MD (01/26/25 09:58:57)                   Impression:      1. Pulmonary vascular congestion/interstitial edema increased from prior exam.      Electronically signed by: Kendell Lopez MD  Date:    01/26/2025  Time:    09:58               Narrative:    EXAMINATION:  XR CHEST AP PORTABLE    CLINICAL HISTORY:  Shortness of breath, chest pain.    TECHNIQUE:  1 view of the  chest.    COMPARISON:  01/04/2025    FINDINGS:  There are diffuse increased interstitial lung markings compatible with pulmonary vascular congestion/interstitial edema which appears worsened from prior exam..  There is no pneumothorax, lobar consolidation, or significant pleural effusion identified.  Vascular calcifications are present.  Pacing device is present.  The cardiac silhouette is stably prominent.    No acute displaced fracture is seen.                                          Telemetry:  A/V paced    Physical Exam  Constitutional:       General: She is not in acute distress.     Appearance: She is obese.   HENT:      Mouth/Throat:      Mouth: Mucous membranes are moist.   Eyes:      Extraocular Movements: Extraocular movements intact.   Cardiovascular:      Rate and Rhythm: Normal rate and regular rhythm.   Pulmonary:      Effort: Pulmonary effort is normal. No respiratory distress.   Abdominal:      Palpations: Abdomen is soft.   Musculoskeletal:      Right lower leg: No edema.      Left lower leg: No edema.   Skin:     General: Skin is warm and dry.   Neurological:      General: No focal deficit present.      Mental Status: She is alert and oriented to person, place, and time.   Psychiatric:         Mood and Affect: Mood normal.         Behavior: Behavior normal.         Home Medications:   No current facility-administered medications on file prior to encounter.     Current Outpatient Medications on File Prior to Encounter   Medication Sig Dispense Refill    amiodarone (PACERONE) 200 MG Tab Take by mouth once daily.      digoxin (LANOXIN) 125 mcg tablet Take 125 mcg by mouth every other day.      EScitalopram oxalate (LEXAPRO) 10 MG tablet       fenofibrate (TRICOR) 48 MG tablet Take 48 mg by mouth once daily.      furosemide (LASIX) 40 MG tablet Take 1 tablet (40 mg total) by mouth 2 (two) times a day. (Patient taking differently: Take 40 mg by mouth 2 (two) times daily.) 60 tablet 1    metoprolol  succinate (TOPROL-XL) 25 MG 24 hr tablet Take 1 tablet (25 mg total) by mouth once daily. (Patient taking differently: Take 25 mg by mouth nightly.) 90 tablet 3    omega-3 acid ethyl esters (LOVAZA) 1 gram capsule Take 1 g by mouth once daily.      potassium chloride (KLOR-CON) 10 MEQ TbSR 1 tablet once daily.      sacubitriL-valsartan (ENTRESTO) 49-51 mg per tablet Take 1 tablet by mouth 2 (two) times daily.      XARELTO 20 mg Tab Take 20 mg by mouth nightly.         Current Inpatient Medications:    Current Facility-Administered Medications:     acetaminophen tablet 650 mg, 650 mg, Oral, Nightly PRN, Ernie Kim MD, 650 mg at 01/26/25 2231    amiodarone tablet 200 mg, 200 mg, Oral, BID, Ernie Kim MD    digoxin tablet 125 mcg, 125 mcg, Oral, Every other day, Charles Busch MD    diltiaZEM 100 mg in dextrose 5% 100 mL IVPB (ready to mix) (titrating), 5 mg/hr, Intravenous, Continuous, Charles Busch MD, Last Rate: 5 mL/hr at 01/27/25 0443, 5 mg/hr at 01/27/25 0443    melatonin tablet 6 mg, 6 mg, Oral, Nightly PRN, Charles Busch MD    metoprolol succinate (TOPROL-XL) 24 hr tablet 50 mg, 50 mg, Oral, Daily, Ernie Kim MD    mupirocin 2 % ointment, , Nasal, BID, Ernie Kim MD    niCARdipine 40 mg/200 mL (0.2 mg/mL) infusion, 0-15 mg/hr, Intravenous, Continuous, Charles Busch MD, Stopped at 01/26/25 1830    rivaroxaban tablet 15 mg, 15 mg, Oral, Daily with dinner, Charles Busch MD, 15 mg at 01/26/25 1721    sacubitriL-valsartan 49-51 mg per tablet 1 tablet, 1 tablet, Oral, BID, Charles uBsch MD, 1 tablet at 01/26/25 2057    sodium chloride 0.9% flush 10 mL, 10 mL, Intravenous, PRN, Charles Busch MD         VTE Risk Mitigation (From admission, onward)           Ordered     IP VTE HIGH RISK PATIENT  Once         01/26/25 2026     Place sequential compression device  Until discontinued         01/26/25 2026     rivaroxaban tablet 15 mg  With dinner         01/26/25 1604                     Assessment:   Afib RVR s/p DCCV - maintaining SR    - awaiting referral to EP    - On Cardizem gtts, amio, BB, Digoxin and Xarelto    - untreated sleep apnea  HF likely exacerbated by above    EF 50% 12/24    On BB and Entresto  S/p PPM - recent generator change      Plan:   Increase Amiodarone to 200 mg bid  Increase Metoprolol suc to 50 mg daily  DC Cardizem gtt  Advised daughter to contact Dr Mckeon's office to let them know of admission requiring DCCV - needs expedited referral to EP  Sleep study as outpatient        Thank you for your consult.     DENIZ Peterson  Cardiology  [facility]  01/27/2025 10:13 AM

## 2025-01-27 NOTE — HPI
79 y.o. female with a history that includes PAF, CHF, Pacemaker presented to ED with  c/o worsening sob and palpitations. Found to be in atrial fibrillation with rvr. TX with cardizem bolus with some improvement but requiring cardizem drip for rate control at this time.  consulted for admission.  Family and patient reuqest tranfer to [facility] but request was denied

## 2025-01-27 NOTE — PT/OT/SLP EVAL
"Physical Therapy Evaluation    Patient Name:  Patsy H Cantu   MRN:  [id number]    Recommendations:     Discharge Recommendations: Low Intensity Therapy (Patient is requesting HH with PT)   Discharge Equipment Recommendations: none   Barriers to discharge:  current medical status    Assessment:     Patsy H Cantu is a 79 y.o. female admitted with a medical diagnosis of Atrial fibrillation with rapid ventricular response.  She presents with the following impairments/functional limitations: weakness, impaired endurance, impaired functional mobility     Patient found with HOB elevated. Patient agreeable to therapy evaluation today. Patient states that she wants to walk. Patient tolerated supine to sit with CGA to sit EOB. Patient then performed sit to stand x 2 reps with CGA. Patient then tolerated 200 feet of functional gait training with RW with CGA. Patient then returned to sitting up in recliner per patient request. Patient left with all needs in reach. Nurse notified..    Rehab Prognosis: Good; patient would benefit from acute skilled PT services to address these deficits and reach maximum level of function.    Recent Surgery: * No surgery found *      Plan:     During this hospitalization, patient to be seen 5 x/week (5-6x weekly/1-2x daily) to address the identified rehab impairments via gait training, therapeutic activities, therapeutic exercises and progress toward the following goals:    Plan of Care Expires:  02/27/25    Subjective     Chief Complaint: "I want to walk"  Patient/Family Comments/goals: to get better to go home  Pain/Comfort:       Patients cultural, spiritual, Scientologist conflicts given the current situation:      Living Environment:  Lives home alone in apartment  Prior to admission, patients level of function was independent, using rollator.  Equipment used at home: rollator, bath bench.  DME owned (not currently used): none.  Upon discharge, patient will have assistance from " friends/family.    Objective:     Communicated with nursing prior to session.  Patient found HOB elevated with peripheral IV, telemetry, pulse ox (continuous), blood pressure cuff, malloy catheter  upon PT entry to room.    General Precautions: Standard, fall  Orthopedic Precautions:N/A   Braces: N/A  Respiratory Status: Nasal cannula, flow 2 L/min    Exams:  RLE Strength: WFL  LLE Strength: WFL    Functional Mobility:  Bed Mobility:     Supine to Sit: contact guard assistance  Transfers:     Sit to Stand:  contact guard assistance with rolling walker  Gait: 200 feet with CGA with RW      AM-PAC 6 CLICK MOBILITY  Total Score:        Treatment & Education:  See above    Patient left up in chair with all lines intact, call button in reach, chair alarm on, and nurse notified.    GOALS:   Multidisciplinary Problems       Physical Therapy Goals          Problem: Physical Therapy    Goal Priority Disciplines Outcome Interventions   Physical Therapy Goal     PT, PT/OT Progressing    Description: Goals to be met by: discharge     Patient will increase functional independence with mobility by performin. Supine to sit with Stand-by Assistance  2. Sit to stand transfer with Stand-by Assistance  3. Gait  x 150 feet with Stand-by Assistance using Rolling Walker.                          DME Justifications:  No DME recommended requiring DME justifications    History:     Past Medical History:   Diagnosis Date    A-fib     CHF (congestive heart failure)     Fluid retention     Hypercholesterolemia     Hypertension     Pacemaker     Paroxysmal atrial fibrillation        Past Surgical History:   Procedure Laterality Date    HYSTERECTOMY      JOINT REPLACEMENT Right     X2    SHOULDER ARTHROSCOPY Left 2023    Procedure: ARTHROSCOPY, SHOULDER;  Surgeon: Albert Sood MD;  Location: [facility];  Service: Orthopedics;  Laterality: Left;       Time Tracking:     PT Received On: 25  PT Start Time: 1550     PT Stop  Time: 1610  PT Total Time (min): 20 min     Billable Minutes: Evaluation 20 01/27/2025

## 2025-01-27 NOTE — NURSING
Pt requesting extra strength tylenol to rest tonight.  Denies any pain. Message sent to hospitalist.

## 2025-01-28 VITALS
DIASTOLIC BLOOD PRESSURE: 94 MMHG | BODY MASS INDEX: 33.38 KG/M2 | OXYGEN SATURATION: 90 % | TEMPERATURE: 98 F | WEIGHT: 195.56 LBS | HEART RATE: 70 BPM | SYSTOLIC BLOOD PRESSURE: 139 MMHG | RESPIRATION RATE: 25 BRPM | HEIGHT: 64 IN

## 2025-01-28 LAB
ALBUMIN SERPL-MCNC: 3.5 G/DL (ref 3.4–5)
ALBUMIN/GLOB SERPL: 1.5 RATIO
ALP SERPL-CCNC: 60 UNIT/L (ref 50–144)
ALT SERPL-CCNC: 13 UNIT/L (ref 1–45)
ANION GAP SERPL CALC-SCNC: 4 MEQ/L (ref 2–13)
AST SERPL-CCNC: 20 UNIT/L (ref 14–36)
BASOPHILS # BLD AUTO: 0.02 X10(3)/MCL (ref 0.01–0.08)
BASOPHILS NFR BLD AUTO: 0.4 % (ref 0.1–1.2)
BILIRUB SERPL-MCNC: 1 MG/DL (ref 0–1)
BNP BLD-MCNC: 1520 PG/ML (ref 0–124.9)
BUN SERPL-MCNC: 25 MG/DL (ref 7–20)
CALCIUM SERPL-MCNC: 9.1 MG/DL (ref 8.4–10.2)
CHLORIDE SERPL-SCNC: 108 MMOL/L (ref 98–110)
CO2 SERPL-SCNC: 27 MMOL/L (ref 21–32)
CREAT SERPL-MCNC: 1.03 MG/DL (ref 0.66–1.25)
CREAT/UREA NIT SERPL: 24 (ref 12–20)
EOSINOPHIL # BLD AUTO: 0.35 X10(3)/MCL (ref 0.04–0.36)
EOSINOPHIL NFR BLD AUTO: 6.4 % (ref 0.7–7)
ERYTHROCYTE [DISTWIDTH] IN BLOOD BY AUTOMATED COUNT: 15.5 % (ref 11–14.5)
GFR SERPLBLD CREATININE-BSD FMLA CKD-EPI: 55 ML/MIN/1.73/M2
GLOBULIN SER-MCNC: 2.3 GM/DL (ref 2–3.9)
GLUCOSE SERPL-MCNC: 93 MG/DL (ref 70–115)
HCT VFR BLD AUTO: 35.7 % (ref 36–48)
HGB BLD-MCNC: 11.2 G/DL (ref 11.8–16)
IMM GRANULOCYTES # BLD AUTO: 0.02 X10(3)/MCL (ref 0–0.03)
IMM GRANULOCYTES NFR BLD AUTO: 0.4 % (ref 0–0.5)
LYMPHOCYTES # BLD AUTO: 1.48 X10(3)/MCL (ref 1.16–3.74)
LYMPHOCYTES NFR BLD AUTO: 27.1 % (ref 20–55)
MCH RBC QN AUTO: 27.5 PG (ref 27–34)
MCHC RBC AUTO-ENTMCNC: 31.4 G/DL (ref 31–37)
MCV RBC AUTO: 87.7 FL (ref 79–99)
MONOCYTES # BLD AUTO: 0.39 X10(3)/MCL (ref 0.24–0.36)
MONOCYTES NFR BLD AUTO: 7.1 % (ref 4.7–12.5)
NEUTROPHILS # BLD AUTO: 3.21 X10(3)/MCL (ref 1.56–6.13)
NEUTROPHILS NFR BLD AUTO: 58.6 % (ref 37–73)
NRBC BLD AUTO-RTO: 0 %
OHS QRS DURATION: 166 MS
OHS QTC CALCULATION: 519 MS
PLATELET # BLD AUTO: 170 X10(3)/MCL (ref 140–371)
PMV BLD AUTO: 10.3 FL (ref 9.4–12.4)
POTASSIUM SERPL-SCNC: 4 MMOL/L (ref 3.5–5.1)
PROT SERPL-MCNC: 5.8 GM/DL (ref 6.3–8.2)
RBC # BLD AUTO: 4.07 X10(6)/MCL (ref 4–5.1)
SODIUM SERPL-SCNC: 139 MMOL/L (ref 136–145)
WBC # BLD AUTO: 5.47 X10(3)/MCL (ref 4–11.5)

## 2025-01-28 PROCEDURE — 85025 COMPLETE CBC W/AUTO DIFF WBC: CPT | Performed by: FAMILY MEDICINE

## 2025-01-28 PROCEDURE — 80053 COMPREHEN METABOLIC PANEL: CPT | Performed by: FAMILY MEDICINE

## 2025-01-28 PROCEDURE — 27000221 HC OXYGEN, UP TO 24 HOURS

## 2025-01-28 PROCEDURE — 25000003 PHARM REV CODE 250: Performed by: FAMILY MEDICINE

## 2025-01-28 PROCEDURE — 83880 ASSAY OF NATRIURETIC PEPTIDE: CPT | Performed by: FAMILY MEDICINE

## 2025-01-28 PROCEDURE — 94761 N-INVAS EAR/PLS OXIMETRY MLT: CPT

## 2025-01-28 PROCEDURE — 99900035 HC TECH TIME PER 15 MIN (STAT)

## 2025-01-28 RX ORDER — METOPROLOL SUCCINATE 50 MG/1
50 TABLET, EXTENDED RELEASE ORAL DAILY
Qty: 90 TABLET | Refills: 3 | Status: SHIPPED | OUTPATIENT
Start: 2025-01-29 | End: 2026-01-29

## 2025-01-28 RX ORDER — AMIODARONE HYDROCHLORIDE 200 MG/1
200 TABLET ORAL 2 TIMES DAILY
Qty: 60 TABLET | Refills: 11 | Status: SHIPPED | OUTPATIENT
Start: 2025-01-28 | End: 2026-01-28

## 2025-01-28 RX ADMIN — AMIODARONE HYDROCHLORIDE 200 MG: 200 TABLET ORAL at 09:01

## 2025-01-28 RX ADMIN — METOPROLOL SUCCINATE 50 MG: 50 TABLET, FILM COATED, EXTENDED RELEASE ORAL at 09:01

## 2025-01-28 RX ADMIN — SACUBITRIL AND VALSARTAN 1 TABLET: 49; 51 TABLET, FILM COATED ORAL at 09:01

## 2025-01-28 NOTE — PT/OT/SLP DISCHARGE
Physical Therapy Discharge Summary    Name: Patsy H Cantu  MRN: [id number]   Principal Problem: Atrial fibrillation with rapid ventricular response     Patient Discharged from acute Physical Therapy on 25.  Please refer to prior PT noted date on 25 for functional status.     Assessment:     Patient appropriate for care in another setting.    Objective:     GOALS:   Multidisciplinary Problems       Physical Therapy Goals          Problem: Physical Therapy    Goal Priority Disciplines Outcome Interventions   Physical Therapy Goal     PT, PT/OT Adequate for Care Transition    Description: Goals to be met by: discharge     Patient will increase functional independence with mobility by performin. Supine to sit with Stand-by Assistance  2. Sit to stand transfer with Stand-by Assistance  3. Gait  x 150 feet with Stand-by Assistance using Rolling Walker.                          Reasons for Discontinuation of Therapy Services  Transfer to alternate level of care.      Plan:     Patient Discharged to: Home with Home Health Service.      2025

## 2025-01-28 NOTE — PLAN OF CARE
Problem: Physical Therapy  Goal: Physical Therapy Goal  Description: Goals to be met by: discharge     Patient will increase functional independence with mobility by performin. Supine to sit with Stand-by Assistance  2. Sit to stand transfer with Stand-by Assistance  3. Gait  x 150 feet with Stand-by Assistance using Rolling Walker.     Outcome: Adequate for Care Transition

## 2025-01-28 NOTE — PLAN OF CARE
Problem: Adult Inpatient Plan of Care  Goal: Plan of Care Review  Outcome: Progressing  Flowsheets (Taken 1/27/2025 1934)  Plan of Care Reviewed With:   patient   family  Goal: Patient-Specific Goal (Individualized)  Outcome: Progressing  Goal: Absence of Hospital-Acquired Illness or Injury  Outcome: Progressing  Goal: Optimal Comfort and Wellbeing  Outcome: Progressing  Goal: Readiness for Transition of Care  Outcome: Progressing     Problem: Infection  Goal: Absence of Infection Signs and Symptoms  Outcome: Progressing     Problem: Dysrhythmia  Goal: Normalized Cardiac Rhythm  Outcome: Progressing

## 2025-01-28 NOTE — PLAN OF CARE
MET FOR DISCHARGE  Problem: Adult Inpatient Plan of Care  Goal: Plan of Care Review  Outcome: Met  Goal: Patient-Specific Goal (Individualized)  Outcome: Met  Goal: Absence of Hospital-Acquired Illness or Injury  Outcome: Met  Goal: Optimal Comfort and Wellbeing  Outcome: Met  Goal: Readiness for Transition of Care  Outcome: Met     Problem: Infection  Goal: Absence of Infection Signs and Symptoms  Outcome: Met     Problem: Dysrhythmia  Goal: Normalized Cardiac Rhythm  Outcome: Met

## 2025-01-28 NOTE — PROGRESS NOTES
Inpatient Nutrition Evaluation    Admit Date: 1/26/2025   Total duration of encounter: 1 day    Nutrition Recommendation/Prescription     Continue Heart Healthy diet as tolerated    Consider Boost Plus BID if po intake decreases <75% (360 kcal, 14 gm pro ea)    Continue to encourage PO intake and honor patient preferences.    Dietitian will monitor Electrolytes, Food and Beverage Intake, Weight, and Weight Change and adjust MNT as needed.       Monitoring & Evaluation     Nutrition Risk/Follow-Up: low (follow-up in 5-7 days)    Patients assigned 'low nutrition risk' status do not qualify for a full nutritional assessment but will be monitored and re-evaluated in a 5-7 day time period. Please consult if re-evaluation needed sooner.          Nutrition Assessment     Chart Review    Malnutrition Screening Tool Results   Have you recently lost weight without trying?: Unsure  Have you been eating poorly because of a decreased appetite?: No   MST Score: 2     Home Nutrition Screen Results   Home Tube Feeding: No   Home Parenteral Nutrition: No       Diagnosis:  Atrial fibrillation with rapid ventricular response  Acute on chronic combined systolic and diastolic congestive heart failure  Anemia  Primary hypertension    Past Medical History:   Diagnosis Date    A-fib     CHF (congestive heart failure)     Fluid retention     Hypercholesterolemia     Hypertension     Pacemaker     Paroxysmal atrial fibrillation      Past Surgical History:   Procedure Laterality Date    HYSTERECTOMY      JOINT REPLACEMENT Right     X2    SHOULDER ARTHROSCOPY Left 8/4/2023    Procedure: ARTHROSCOPY, SHOULDER;  Surgeon: Albert Sood MD;  Location: [facility];  Service: Orthopedics;  Laterality: Left;       Scheduled Medications:  amiodarone, 200 mg, BID  digoxin, 125 mcg, Every other day  metoprolol succinate, 50 mg, Daily  mupirocin, , BID  rivaroxaban, 15 mg, Daily with dinner  sacubitriL-valsartan, 1 tablet, BID    Continuous  "Infusions:   PRN Medications:   Current Facility-Administered Medications:     acetaminophen, 650 mg, Oral, Nightly PRN    melatonin, 6 mg, Oral, Nightly PRN    sodium chloride 0.9%, 10 mL, Intravenous, PRN    Calorie Containing IV Medications: no significant kcals from medications at this time    Nutrition-Related Labs:  Recent Labs   Lab 01/26/25  0833 01/27/25  0344    140   K 3.9 4.0   CALCIUM 9.4 8.7   CO2 27 26   BUN 26* 28*   CREATININE 1.11 1.38*   EGFRNORACEVR 51 39   GLUCOSE 114 107   BILITOT 0.7 1.2*   ALKPHOS 64 55   ALT 19 15   AST 26 23   ALBUMIN 4.1 3.5   WBC 6.09 6.68   HGB 13.5 11.6*   HCT 44.8 38.1     CrCl:    Lab Value: 35.5    Date: 01/27    Nutrition Orders:  Diet Heart Healthy Standard Tray    Other Oral Supplement Order: None/Already listed above  Appetite/Oral Intake: good/% of meals  Factors Affecting Nutritional Intake: shortness of breath  Food/Jewish/Cultural Preferences: unable to obtain  Food Allergies: Review of patient's allergies indicates:  No Known Allergies       Wound(s):       Overview/Hospital Course:    (01/27) SCREENED D/T [facility]ST, 79 Y.O FEMALE ADMITTED W/ SOB, HEART HEALTHY DIET, GOOD APT, 75% X3, MEALS, 1+ TRACE GENERALIZED EDEMA, NUTR SCORE: 3, BRDN SCORE: 20, ABD OBESE, LBM 01/26, I/O: 520/500, PER EMR    Anthropometrics    Height: 5' 4" (162.6 cm) Height Method: Stated  Last Weight: 88 kg (194 lb 0.1 oz) (01/26/25 2119) Weight Method: Bed Scale  BMI (Calculated): 33.3  BMI Classification: obese grade I (BMI 30-34.9)     Ideal Body Weight (IBW), Female: 120 lb     % Ideal Body Weight, Female (lb): 162.5 %                             Usual Weight Provided By: EMR weight history    Wt Readings from Last 5 Encounters:   01/26/25 88 kg (194 lb 0.1 oz)   01/05/25 81.7 kg (180 lb 3.2 oz)   07/05/24 82.6 kg (182 lb 1.6 oz)   05/05/24 82.6 kg (182 lb)   08/04/23 90.7 kg (200 lb)     Weight Change(s) Since Admission:  Admit Weight: 88.5 kg (195 lb) (01/26/25 " 0822)      Patient Education    Not applicable.

## 2025-01-28 NOTE — PLAN OF CARE
Physician ordered to discharge patient to home. Pt's home health care provider ( [facility] ) was notified per phone/fax of pt's discharge, spoke with Cristian. [facility] nurse to resume home visits..

## 2025-01-28 NOTE — DISCHARGE SUMMARY
[facility] Medicine  Discharge Summary      Patient Name: Patsy H Cantu  MRN: [id number]  JENA: [id number]  Patient Class: IP- Inpatient  Admission Date: 1/26/2025  Hospital Length of Stay: 2 days  Discharge Date and Time: No discharge date for patient encounter.  Attending Physician: Ernie Kim MD   Discharging Provider: Elisha Moya MD  Primary Care Provider: Mery Sanon NP    Primary Care Team: Networked reference to record PCT     HPI:   79 y.o. female with a history that includes PAF, CHF, Pacemaker presented to ED with  c/o worsening sob and palpitations. Found to be in atrial fibrillation with rvr. TX with cardizem bolus with some improvement but requiring cardizem drip for rate control at this time. HM consulted for admission.  Family and patient reuqest tranfer to [facility] but request was denied    * No surgery found *      Hospital Course:   01/27/2025 pt presented to [facility] with afib with RVR cardioverted in ER.  Recently had generator change and was here at [facility] d/c 01/05/2025 with CHF exacerbation.  Known to be in afib since the generator change and is waiting on EP appointment.  [facility] saw pt this am recommended med adjustments, continue in ICU overnight and if no recurrence of afib witll d/c home in am.  Family will check with Dr. Demond Mckeon's office on the EP appointment    01/28/2025 DISCHARGE SUMMARY: Pt admitted with afib with RVR, pt known paroxysmal afib on eliquis, amiodarone, dig and metoprolol.  S/p revision of medtronic pacer about 3 weeks ago.  She had an appointment with Dr. Michelle which was made by Dr. Demond Mckeon's office for 1/13, family was able to get this appointment moved up to 01/30/2025  In Er pt was cardioverted then admitted to ICU on a diltiazem drip.  [facility] consutled here and recommended increase her amiodarone to 200mg bid and increase metorprolol to 50 XL daily. Diltiazem has been d/c.  EF is preserved.  Her HR has been stable overnight, she  "will be d/c home with increased amio and metoprolol, keep appointment for EP eval with Dr. Michelle on Thursday and continue her xarelto at 20mg daily.  She will f/u with Dr. Mckeon and PCP as needed     Goals of Care Treatment Preferences:  Code Status: DNR      SDOH Screening:  The patient was screened for utility difficulties, food insecurity, transport difficulties, housing insecurity, and interpersonal safety and there were no concerns identified this admission.     Consults:   Consults (From admission, onward)          Status Ordering Provider     Inpatient consult to Telemedicine-Card  Once        Provider:  [facility] -    Acknowledged MARIE ALBERTODELFINO            * Atrial fibrillation with rapid ventricular response  Patient has paroxysmal (<7 days) atrial fibrillation. Patient is currently in atrial fibrillation. [id number] Score: 3. The patients heart rate in the last 24 hours is as follows:  Pulse  Min: 63  Max: 155     Antiarrhythmics  diltiaZEM 100 mg in dextrose 5% 100 mL IVPB (ready to mix) (titrating), Continuous, Intravenous  amiodarone tablet 200 mg, 2 times daily, Oral  metoprolol succinate (TOPROL-XL) 24 hr tablet 50 mg, Daily, Oral    Anticoagulants  rivaroxaban tablet 15 mg, With dinner, Oral    Plan  - Replete lytes with a goal of K>4, Mg >2  - Patient is anticoagulated, see medications listed above.  - Patient's afib is currently controlled  -          Acute on chronic combined systolic and diastolic congestive heart failure  Patient has Combined Systolic and Diastolic heart failure that is Acute on chronic. On presentation their CHF was well compensated. Most recent BNP and echo results are listed below.  No results for input(s): "BNP" in the last 72 hours.  Latest ECHO  Results for orders placed during the hospital encounter of 05/02/24    Echo Saline Bubble? No; Ultrasound enhancing contrast? Yes    Interpretation Summary    Left Ventricle: The left " ventricle is normal in size. There is moderately reduced systolic function with a visually estimated ejection fraction of 35%.  The mid to distal anteroseptum appears hypokinetic.  There is diastolic dysfunction.    Right Ventricle: Normal right ventricular cavity size. Systolic function is mildly reduced.    Aortic Valve: There is mild aortic valve sclerosis. Mildly restricted motion. There is mild aortic regurgitation.    Mitral Valve: There is mild (1+) regurgitation.    Tricuspid Valve: There is mild to moderate (1-2+) regurgitation.    Pulmonic Valve: There is no stenosis. There is mild (1+) regurgitation.    The estimated pulmonary artery systolic pressure is 27 mmHg.    AICD/pacemaker lead noted.    Current Heart Failure Medications  digoxin tablet 125 mcg, Every other day, Oral  sacubitriL-valsartan 49-51 mg per tablet 1 tablet, 2 times daily, Oral  metoprolol succinate (TOPROL-XL) 24 hr tablet 50 mg, Daily, Oral    Plan  - Monitor strict I&Os and daily weights.    - Place on telemetry  - Low sodium diet  - Place on fluid restriction of 1.5 L.   - Cardiology has been consulted  - The patient's volume status is at their baseline  -          Anemia  Anemia is likely due to Iron deficiency. Most recent hemoglobin and hematocrit are listed below.  Recent Labs     01/26/25  0833 01/27/25  0344   HGB 13.5 11.6*   HCT 44.8 38.1     Plan  - Monitor serial CBC: Daily  - Transfuse PRBC if patient becomes hemodynamically unstable, symptomatic or H/H drops below 7/21.  - Patient has not received any PRBC transfusions to date  - Patient's anemia is currently stable  -      Primary hypertension  Patient's blood pressure range in the last 24 hours was: BP  Min: 104/68  Max: 195/141.The patient's inpatient anti-hypertensive regimen is listed below:  Current Antihypertensives  diltiaZEM 100 mg in dextrose 5% 100 mL IVPB (ready to mix) (titrating), Continuous, Intravenous  niCARdipine 40 mg/200 mL (0.2 mg/mL) infusion,  Continuous, Intravenous  metoprolol succinate (TOPROL-XL) 24 hr tablet 50 mg, Daily, Oral    Plan  - BP is controlled, no changes needed to their regimen  -        Final Active Diagnoses:    Diagnosis Date Noted POA    PRINCIPAL PROBLEM:  Atrial fibrillation with rapid ventricular response [I48.91] 07/05/2024 Yes    Acute on chronic combined systolic and diastolic congestive heart failure [I50.43] 01/04/2025 Yes    Anemia [D64.9] 01/27/2025 Yes    Primary hypertension [I10] 01/04/2025 Yes      Problems Resolved During this Admission:       Discharged Condition: good    Disposition:     Follow Up:   Follow-up Information       Mery Sanon, NP Follow up.    Specialty: Family Medicine  Contact information:  [address] [place] [id number] [phone]               Vinay Michelle, DO Follow up.    Specialty: Cardiology  Why: keep appointment Thursday 01/30/2025  Contact information:  [address] [place] [address] [id number] [phone]                           Patient Instructions:   No discharge procedures on file.    Significant Diagnostic Studies: Labs: CMP   Recent Labs   Lab 01/27/25  0344 01/28/25  0359    139   K 4.0 4.0    108   CO2 26 27   BUN 28* 25*   CREATININE 1.38* 1.03   CALCIUM 8.7 9.1   ALBUMIN 3.5 3.5   BILITOT 1.2* 1.0   ALKPHOS 55 60   AST 23 20   ALT 15 13    and CBC   Recent Labs   Lab 01/27/25  0344 01/28/25  0358   WBC 6.68 5.47   HGB 11.6* 11.2*   HCT 38.1 35.7*    170       Pending Diagnostic Studies:       None           Medications:  Reconciled Home Medications:      Medication List        CHANGE how you take these medications      amiodarone 200 MG Tab  Commonly known as: PACERONE  Take 1 tablet (200 mg total) by mouth 2 (two) times daily.  What changed:   how much to take  when to take this     furosemide 40 MG tablet  Commonly known as: LASIX  Take 1 tablet (40 mg total) by mouth 2 (two) times a day.  What changed: when to take this      metoprolol succinate 50 MG 24 hr tablet  Commonly known as: TOPROL-XL  Take 1 tablet (50 mg total) by mouth once daily.  Start taking on: January 29, 2025  What changed:   medication strength  how much to take            CONTINUE taking these medications      digoxin 125 mcg tablet  Commonly known as: LANOXIN  Take 125 mcg by mouth every other day.     ENTRESTO 49-51 mg per tablet  Generic drug: sacubitriL-valsartan  Take 1 tablet by mouth 2 (two) times daily.     EScitalopram oxalate 10 MG tablet  Commonly known as: LEXAPRO     fenofibrate 48 MG tablet  Commonly known as: TRICOR  Take 48 mg by mouth once daily.     omega-3 acid ethyl esters 1 gram capsule  Commonly known as: LOVAZA  Take 1 g by mouth once daily.     potassium chloride 10 MEQ Tbsr  Commonly known as: KLOR-CON  1 tablet once daily.     XARELTO 20 mg Tab  Generic drug: rivaroxaban  Take 20 mg by mouth nightly.              Indwelling Lines/Drains at time of discharge:   Lines/Drains/Airways       None                   Time spent on the discharge of patient: 38 minutes     Patient Screened for food insecurity, housing instability, transportation needs, utility difficulties, and interpersonal safety.  No needs identified    Physical Exam  Constitutional:       General: She is not in acute distress.     Appearance: Normal appearance. She is normal weight. She is not ill-appearing.   Cardiovascular:      Rate and Rhythm: Normal rate and regular rhythm.      Pulses: Normal pulses.      Heart sounds: Normal heart sounds.   Pulmonary:      Effort: Pulmonary effort is normal.      Breath sounds: Normal breath sounds.   Abdominal:      General: Abdomen is flat.      Palpations: Abdomen is soft.   Musculoskeletal:      Right lower leg: No edema.      Left lower leg: No edema.   Skin:     General: Skin is warm and dry.      Findings: No erythema, lesion or rash.   Neurological:      Mental Status: She is alert.   Psychiatric:         Behavior: Behavior  normal.      Comments: I had a face to face encounter with this patient prior to d/c           Elisha Moya MD  Department of Hospital Medicine  [facility]

## 2025-01-28 NOTE — PROGRESS NOTES
[facility]  Cardiology  Progress Note    Patient Name: Patsy H Cantu  MRN: [id number]  Admission Date: 1/26/2025  Hospital Length of Stay: 2 days  Code Status: DNR   Attending Provider: Ernie Kim MD   Consulting Provider: DENIZ Peterson  Primary Care Physician: Mery Sanon NP  Principal Problem:Atrial fibrillation with rapid ventricular response    Patient information was obtained from patient, relative(s), past medical records, ER records, and primary team.     Subjective:     Chief Complaint:  Atrial fib, RVR    HPI: 78yo morbidly obese F w/ PAF, chronic diastolic HF, htn, HL, anxiety presented to the ER with dyspnea and weight gain. She had gained 3# last week and was on a double dose of lasix for 3 days. She didn't lose any of the weight. Yesterday she was at her granddaughter's 1 year birthday until approx 8PM and was doing fine but called her daughter this morning c/o dyspnea and was brought to the ER. She was found to be in AF RVR and treatment was initiated. Approx 6-8 hours after admission she worsened with tachycardia, hypertension, and failure to maintain O2 sat on O2 per NC and was put on bipap. [facility] was then consulted for assistance with AF RVR. Her daughter at bedside provided most of the history.  Pt had a pacemaker gen change last month and was in AF at that time. She was referred for AF ablation but haven't heard from EP yet for appointment. At 2 week post-PPM check she was noted to be in SR.   She had a LHC in December 2024 and was told she had normal coronary arteries.  Her Cardiologist is Dr. Mckeon and [facility].  She is DNR/DNI code status and I reiterated this with both patient and her family and the ER doc.      1/27: Seen by telecardiology yesterday. DCCV in ER.   VSS, NAD. Denies CP or SOB. Maintaining SR and remains on Cardizem gtt. Daughter at bedside. Hx PAF. Patient of Dr Mckeon. Recent generator change (was DC in afib and referred to EP, has  not heard back)    1/28: VSS, NAD. Denies CP or SOB. Maintaining SR. Amio and Metoprolol increased yesterday. She states she has an appt on Thurs but it is unclear if it is with cardiology or EP.      Previous cardiac testing:  Results for orders placed during the hospital encounter of 05/02/24    Echo Saline Bubble? No; Ultrasound enhancing contrast? Yes    Interpretation Summary    Left Ventricle: The left ventricle is normal in size. There is moderately reduced systolic function with a visually estimated ejection fraction of 35%.  The mid to distal anteroseptum appears hypokinetic.  There is diastolic dysfunction.    Right Ventricle: Normal right ventricular cavity size. Systolic function is mildly reduced.    Aortic Valve: There is mild aortic valve sclerosis. Mildly restricted motion. There is mild aortic regurgitation.    Mitral Valve: There is mild (1+) regurgitation.    Tricuspid Valve: There is mild to moderate (1-2+) regurgitation.    Pulmonic Valve: There is no stenosis. There is mild (1+) regurgitation.    The estimated pulmonary artery systolic pressure is 27 mmHg.    AICD/pacemaker lead noted.           Past Medical History:   Diagnosis Date    A-fib     CHF (congestive heart failure)     Fluid retention     Hypercholesterolemia     Hypertension     Pacemaker     Paroxysmal atrial fibrillation        Past Surgical History:   Procedure Laterality Date    HYSTERECTOMY      JOINT REPLACEMENT Right     X2    SHOULDER ARTHROSCOPY Left 8/4/2023    Procedure: ARTHROSCOPY, SHOULDER;  Surgeon: Albert Sood MD;  Location: [facility];  Service: Orthopedics;  Laterality: Left;       Review of patient's allergies indicates:  No Known Allergies    No current facility-administered medications on file prior to encounter.     Current Outpatient Medications on File Prior to Encounter   Medication Sig    amiodarone (PACERONE) 200 MG Tab Take by mouth once daily.    digoxin (LANOXIN) 125 mcg tablet Take 125 mcg by  mouth every other day.    EScitalopram oxalate (LEXAPRO) 10 MG tablet     fenofibrate (TRICOR) 48 MG tablet Take 48 mg by mouth once daily.    furosemide (LASIX) 40 MG tablet Take 1 tablet (40 mg total) by mouth 2 (two) times a day. (Patient taking differently: Take 40 mg by mouth 2 (two) times daily.)    metoprolol succinate (TOPROL-XL) 25 MG 24 hr tablet Take 1 tablet (25 mg total) by mouth once daily. (Patient taking differently: Take 25 mg by mouth nightly.)    omega-3 acid ethyl esters (LOVAZA) 1 gram capsule Take 1 g by mouth once daily.    potassium chloride (KLOR-CON) 10 MEQ TbSR 1 tablet once daily.    sacubitriL-valsartan (ENTRESTO) 49-51 mg per tablet Take 1 tablet by mouth 2 (two) times daily.    XARELTO 20 mg Tab Take 20 mg by mouth nightly.     Family History    None       Tobacco Use    Smoking status: Never    Smokeless tobacco: Never   Substance and Sexual Activity    Alcohol use: Never    Drug use: Never    Sexual activity: Not Currently       Review of Systems   Constitutional: Negative.    Respiratory: Negative.     Cardiovascular: Negative.    Gastrointestinal: Negative.    Neurological: Negative.        Objective:     Vital Signs (Most Recent):  Temp: 98.4 °F (36.9 °C) (01/28/25 0709)  Pulse: 75 (01/28/25 0744)  Resp: 20 (01/28/25 0744)  BP: (!) 152/97 (01/28/25 0701)  SpO2: 98 % (01/28/25 0744) Vital Signs (24h Range):  Temp:  [98.3 °F (36.8 °C)-99.4 °F (37.4 °C)] 98.4 °F (36.9 °C)  Pulse:  [68-77] 75  Resp:  [13-34] 20  SpO2:  [90 %-99 %] 98 %  BP: (124-170)/() 152/97     Weight: 88.7 kg (195 lb 8.8 oz)  Body mass index is 33.57 kg/m².    SpO2: 98 %         Intake/Output Summary (Last 24 hours) at 1/28/2025 0839  Last data filed at 1/28/2025 0809  Gross per 24 hour   Intake 370 ml   Output 1566 ml   Net -1196 ml       Lines/Drains/Airways       Drain  Duration                  Urethral Catheter 01/26/25 1750 16 Fr. 1 day              Peripheral Intravenous Line  Duration                   Peripheral IV - Single Lumen 20 G Anterior;Left Forearm -- days         Peripheral IV - Single Lumen 01/26/25 1739 22 G Anterior;Right Forearm 1 day                    Significant Labs:  Recent Results (from the past 72 hours)   EKG 12-lead (Rapid Heart Beat / Palpitations) Age > 50    Collection Time: 01/26/25  8:21 AM   Result Value Ref Range    QRS Duration 166 ms    [facility] QTC Calculation 519 ms   Comprehensive metabolic panel    Collection Time: 01/26/25  8:33 AM   Result Value Ref Range    Sodium 142 136 - 145 mmol/L    Potassium 3.9 3.5 - 5.1 mmol/L    Chloride 109 98 - 110 mmol/L    CO2 27 21 - 32 mmol/L    Glucose 114 70 - 115 mg/dL    Blood Urea Nitrogen 26 (H) 7.0 - 20.0 mg/dL    Creatinine 1.11 0.66 - 1.25 mg/dL    Calcium 9.4 8.4 - 10.2 mg/dL    Protein Total 6.7 6.3 - 8.2 gm/dL    Albumin 4.1 3.4 - 5.0 g/dL    Globulin 2.6 2.0 - 3.9 gm/dL    Albumin/Globulin Ratio 1.6 ratio    Bilirubin Total 0.7 0.0 - 1.0 mg/dL    ALP 64 50 - 144 unit/L    ALT 19 1 - 45 unit/L    AST 26 14 - 36 unit/L    eGFR 51 mL/min/1.73/m2    Anion Gap 6.0 2.0 - 13.0 mEq/L    BUN/Creatinine Ratio 23 (H) 12 - 20   Troponin I    Collection Time: 01/26/25  8:33 AM   Result Value Ref Range    Troponin-I <0.012 0.000 - 0.034 ng/mL   CBC with Differential    Collection Time: 01/26/25  8:33 AM   Result Value Ref Range    WBC 6.09 4.00 - 11.50 x10(3)/mcL    RBC 4.91 4.00 - 5.10 x10(6)/mcL    Hgb 13.5 11.8 - 16.0 g/dL    Hct 44.8 36.0 - 48.0 %    MCV 91.2 79.0 - 99.0 fL    MCH 27.5 27.0 - 34.0 pg    MCHC 30.1 (L) 31.0 - 37.0 g/dL    RDW 15.7 (H) 11.0 - 14.5 %    Platelet 179 140 - 371 x10(3)/mcL    MPV 10.6 9.4 - 12.4 fL    Neut % 71.0 37 - 73 %    Lymph % 20.7 20 - 55 %    Mono % 5.4 4.7 - 12.5 %    Eos % 2.5 0.7 - 7 %    Basophil % 0.2 0.1 - 1.2 %    Lymph # 1.26 1.16 - 3.74 x10(3)/mcL    Neut # 4.33 1.56 - 6.13 x10(3)/mcL    Mono # 0.33 0.24 - 0.36 x10(3)/mcL    Eos # 0.15 0.04 - 0.36 x10(3)/mcL    Baso # 0.01 0.01 - 0.08 x10(3)/mcL     Imm Gran # 0.01 0.00 - 0.03 x10(3)/mcL    Imm Grans % 0.2 0 - 0.5 %   NT-Pro Natriuretic Peptide    Collection Time: 01/26/25  8:33 AM   Result Value Ref Range    ProBNP 5,650.0 (H) 0.0 - 124.9 PG/ML   Comprehensive metabolic panel    Collection Time: 01/27/25  3:44 AM   Result Value Ref Range    Sodium 140 136 - 145 mmol/L    Potassium 4.0 3.5 - 5.1 mmol/L    Chloride 108 98 - 110 mmol/L    CO2 26 21 - 32 mmol/L    Glucose 107 70 - 115 mg/dL    Blood Urea Nitrogen 28 (H) 7.0 - 20.0 mg/dL    Creatinine 1.38 (H) 0.66 - 1.25 mg/dL    Calcium 8.7 8.4 - 10.2 mg/dL    Protein Total 6.1 (L) 6.3 - 8.2 gm/dL    Albumin 3.5 3.4 - 5.0 g/dL    Globulin 2.6 2.0 - 3.9 gm/dL    Albumin/Globulin Ratio 1.3 ratio    Bilirubin Total 1.2 (H) 0.0 - 1.0 mg/dL    ALP 55 50 - 144 unit/L    ALT 15 1 - 45 unit/L    AST 23 14 - 36 unit/L    eGFR 39 mL/min/1.73/m2    Anion Gap 6.0 2.0 - 13.0 mEq/L    BUN/Creatinine Ratio 20 12 - 20   CBC with Differential    Collection Time: 01/27/25  3:44 AM   Result Value Ref Range    WBC 6.68 4.00 - 11.50 x10(3)/mcL    RBC 4.22 4.00 - 5.10 x10(6)/mcL    Hgb 11.6 (L) 11.8 - 16.0 g/dL    Hct 38.1 36.0 - 48.0 %    MCV 90.3 79.0 - 99.0 fL    MCH 27.5 27.0 - 34.0 pg    MCHC 30.4 (L) 31.0 - 37.0 g/dL    RDW 15.7 (H) 11.0 - 14.5 %    Platelet 177 140 - 371 x10(3)/mcL    MPV 10.3 9.4 - 12.4 fL    Neut % 73.4 (H) 37 - 73 %    Lymph % 19.0 (L) 20 - 55 %    Mono % 6.7 4.7 - 12.5 %    Eos % 0.3 (L) 0.7 - 7 %    Basophil % 0.3 0.1 - 1.2 %    Lymph # 1.27 1.16 - 3.74 x10(3)/mcL    Neut # 4.90 1.56 - 6.13 x10(3)/mcL    Mono # 0.45 (H) 0.24 - 0.36 x10(3)/mcL    Eos # 0.02 (L) 0.04 - 0.36 x10(3)/mcL    Baso # 0.02 0.01 - 0.08 x10(3)/mcL    Imm Gran # 0.02 0.00 - 0.03 x10(3)/mcL    Imm Grans % 0.3 0 - 0.5 %   CBC with Differential    Collection Time: 01/28/25  3:58 AM   Result Value Ref Range    WBC 5.47 4.00 - 11.50 x10(3)/mcL    RBC 4.07 4.00 - 5.10 x10(6)/mcL    Hgb 11.2 (L) 11.8 - 16.0 g/dL    Hct 35.7 (L) 36.0 - 48.0 %     MCV 87.7 79.0 - 99.0 fL    MCH 27.5 27.0 - 34.0 pg    MCHC 31.4 31.0 - 37.0 g/dL    RDW 15.5 (H) 11.0 - 14.5 %    Platelet 170 140 - 371 x10(3)/mcL    MPV 10.3 9.4 - 12.4 fL    Neut % 58.6 37 - 73 %    Lymph % 27.1 20 - 55 %    Mono % 7.1 4.7 - 12.5 %    Eos % 6.4 0.7 - 7 %    Basophil % 0.4 0.1 - 1.2 %    Lymph # 1.48 1.16 - 3.74 x10(3)/mcL    Neut # 3.21 1.56 - 6.13 x10(3)/mcL    Mono # 0.39 (H) 0.24 - 0.36 x10(3)/mcL    Eos # 0.35 0.04 - 0.36 x10(3)/mcL    Baso # 0.02 0.01 - 0.08 x10(3)/mcL    Imm Gran # 0.02 0.00 - 0.03 x10(3)/mcL    Imm Grans % 0.4 0 - 0.5 %    NRBC% 0.0 <=1 %   Comprehensive Metabolic Panel    Collection Time: 01/28/25  3:59 AM   Result Value Ref Range    Sodium 139 136 - 145 mmol/L    Potassium 4.0 3.5 - 5.1 mmol/L    Chloride 108 98 - 110 mmol/L    CO2 27 21 - 32 mmol/L    Glucose 93 70 - 115 mg/dL    Blood Urea Nitrogen 25 (H) 7.0 - 20.0 mg/dL    Creatinine 1.03 0.66 - 1.25 mg/dL    Calcium 9.1 8.4 - 10.2 mg/dL    Protein Total 5.8 (L) 6.3 - 8.2 gm/dL    Albumin 3.5 3.4 - 5.0 g/dL    Globulin 2.3 2.0 - 3.9 gm/dL    Albumin/Globulin Ratio 1.5 ratio    Bilirubin Total 1.0 0.0 - 1.0 mg/dL    ALP 60 50 - 144 unit/L    ALT 13 1 - 45 unit/L    AST 20 14 - 36 unit/L    eGFR 55 mL/min/1.73/m2    Anion Gap 4.0 2.0 - 13.0 mEq/L    BUN/Creatinine Ratio 24 (H) 12 - 20   NT-Pro Natriuretic Peptide    Collection Time: 01/28/25  3:59 AM   Result Value Ref Range    ProBNP 1,520.0 (H) 0.0 - 124.9 PG/ML       Significant Imaging:  Imaging Results              X-Ray Chest AP (Final result)  Result time 01/26/25 09:58:57      Final result by Kendell Lopez MD (01/26/25 09:58:57)                   Impression:      1. Pulmonary vascular congestion/interstitial edema increased from prior exam.      Electronically signed by: Kendell Lopez MD  Date:    01/26/2025  Time:    09:58               Narrative:    EXAMINATION:  XR CHEST AP PORTABLE    CLINICAL HISTORY:  Shortness of breath, chest  pain.    TECHNIQUE:  1 view of the chest.    COMPARISON:  01/04/2025    FINDINGS:  There are diffuse increased interstitial lung markings compatible with pulmonary vascular congestion/interstitial edema which appears worsened from prior exam..  There is no pneumothorax, lobar consolidation, or significant pleural effusion identified.  Vascular calcifications are present.  Pacing device is present.  The cardiac silhouette is stably prominent.    No acute displaced fracture is seen.                                          Telemetry:  A/V paced    Physical Exam  Constitutional:       General: She is not in acute distress.     Appearance: She is obese.   HENT:      Mouth/Throat:      Mouth: Mucous membranes are moist.   Eyes:      Extraocular Movements: Extraocular movements intact.   Cardiovascular:      Rate and Rhythm: Normal rate and regular rhythm.   Pulmonary:      Effort: Pulmonary effort is normal. No respiratory distress.      Comments: Decreased at bases  Abdominal:      Palpations: Abdomen is soft.   Musculoskeletal:      Right lower leg: No edema.      Left lower leg: No edema.   Skin:     General: Skin is warm and dry.   Neurological:      General: No focal deficit present.      Mental Status: She is alert and oriented to person, place, and time.   Psychiatric:         Mood and Affect: Mood normal.         Behavior: Behavior normal.         Home Medications:   No current facility-administered medications on file prior to encounter.     Current Outpatient Medications on File Prior to Encounter   Medication Sig Dispense Refill    amiodarone (PACERONE) 200 MG Tab Take by mouth once daily.      digoxin (LANOXIN) 125 mcg tablet Take 125 mcg by mouth every other day.      EScitalopram oxalate (LEXAPRO) 10 MG tablet       fenofibrate (TRICOR) 48 MG tablet Take 48 mg by mouth once daily.      furosemide (LASIX) 40 MG tablet Take 1 tablet (40 mg total) by mouth 2 (two) times a day. (Patient taking differently: Take  40 mg by mouth 2 (two) times daily.) 60 tablet 1    metoprolol succinate (TOPROL-XL) 25 MG 24 hr tablet Take 1 tablet (25 mg total) by mouth once daily. (Patient taking differently: Take 25 mg by mouth nightly.) 90 tablet 3    omega-3 acid ethyl esters (LOVAZA) 1 gram capsule Take 1 g by mouth once daily.      potassium chloride (KLOR-CON) 10 MEQ TbSR 1 tablet once daily.      sacubitriL-valsartan (ENTRESTO) 49-51 mg per tablet Take 1 tablet by mouth 2 (two) times daily.      XARELTO 20 mg Tab Take 20 mg by mouth nightly.         Current Inpatient Medications:    Current Facility-Administered Medications:     acetaminophen tablet 650 mg, 650 mg, Oral, Nightly PRN, Ernie Kim MD, 650 mg at 01/26/25 2231    amiodarone tablet 200 mg, 200 mg, Oral, BID, Ernie Kim MD, 200 mg at 01/27/25 2100    digoxin tablet 125 mcg, 125 mcg, Oral, Every other day, Charles Busch MD, 125 mcg at 01/27/25 1020    melatonin tablet 6 mg, 6 mg, Oral, Nightly PRN, Charles Busch MD    metoprolol succinate (TOPROL-XL) 24 hr tablet 50 mg, 50 mg, Oral, Daily, Ernie Kim MD, 50 mg at 01/27/25 1019    mupirocin 2 % ointment, , Nasal, BID, Ernie Kim MD, Given at 01/27/25 2100    rivaroxaban tablet 15 mg, 15 mg, Oral, Daily with dinner, Charles Busch MD, 15 mg at 01/27/25 1650    sacubitriL-valsartan 49-51 mg per tablet 1 tablet, 1 tablet, Oral, BID, Charles Busch MD, 1 tablet at 01/27/25 2100    sodium chloride 0.9% flush 10 mL, 10 mL, Intravenous, PRN, Charles Busch MD         VTE Risk Mitigation (From admission, onward)           Ordered     IP VTE HIGH RISK PATIENT  Once         01/26/25 2026     rivaroxaban tablet 15 mg  With dinner         01/26/25 1607                    Assessment:   Afib RVR s/p DCCV - maintaining SR    - awaiting referral to EP    -  amio and  BB increased yest       on Digoxin and Xarelto    - untreated sleep apnea  HF likely exacerbated by above    EF 50% 12/24    On BB and  Entresto  S/p PPM - recent generator change      Plan:   Continue Amiodarone to 200 mg bid  Continue Metoprolol suc to 50 mg daily  Continue Digoxin and Xarelto (should be on 20 mg)  Continue Entresto  Needs follow up with Dr Mckeon and referral for EP  Sleep study as outpatient  Ok to DC from cardiac standpoint          Indy Mtz, DENIZ  Cardiology  [facility]  01/28/2025

## 2025-01-28 NOTE — PLAN OF CARE
01/28/25 1033   Final Note   Assessment Type Final Discharge Note   Anticipated Discharge Disposition Home-Health  ([facility])   What phone number can be called within the next 1-3 days to see how you are doing after discharge? [phone]   Hospital Resources/Appts/Education Provided Post-Acute resouces added to AVS   Post-Acute Status   Post-Acute Authorization Home Health   Home Health Status Set-up Complete/Auth obtained   Coverage [employer] Managed MCR   Discharge Delays None known at this time

## 2025-01-28 NOTE — PLAN OF CARE
Problem: Adult Inpatient Plan of Care  Goal: Plan of Care Review  Outcome: Progressing  Goal: Absence of Hospital-Acquired Illness or Injury  Outcome: Progressing  Goal: Optimal Comfort and Wellbeing  Outcome: Progressing  Goal: Readiness for Transition of Care  Outcome: Progressing     Problem: Infection  Goal: Absence of Infection Signs and Symptoms  Outcome: Progressing     Problem: Dysrhythmia  Goal: Normalized Cardiac Rhythm  Outcome: Progressing

## 2025-02-10 ENCOUNTER — LAB VISIT (OUTPATIENT)
Dept: LAB | Facility: HOSPITAL | Age: 80
End: 2025-02-10
Attending: INTERNAL MEDICINE
Payer: MEDICARE

## 2025-02-10 DIAGNOSIS — I49.5 SINOATRIAL NODE DYSFUNCTION: ICD-10-CM

## 2025-02-10 DIAGNOSIS — I48.19 PERSISTENT ATRIAL FIBRILLATION: Primary | ICD-10-CM

## 2025-02-10 LAB
ALBUMIN SERPL-MCNC: 4.2 G/DL (ref 3.4–5)
ALBUMIN/GLOB SERPL: 1.6 RATIO
ALP SERPL-CCNC: 70 UNIT/L (ref 50–144)
ALT SERPL-CCNC: 14 UNIT/L (ref 1–45)
ANION GAP SERPL CALC-SCNC: 5 MEQ/L (ref 2–13)
APTT PPP: 31.9 SECONDS
AST SERPL-CCNC: 22 UNIT/L (ref 14–36)
BASOPHILS # BLD AUTO: 0.03 X10(3)/MCL (ref 0.01–0.08)
BASOPHILS NFR BLD AUTO: 0.5 % (ref 0.1–1.2)
BILIRUB SERPL-MCNC: 0.5 MG/DL (ref 0–1)
BUN SERPL-MCNC: 18 MG/DL (ref 7–20)
CALCIUM SERPL-MCNC: 10.2 MG/DL (ref 8.4–10.2)
CHLORIDE SERPL-SCNC: 108 MMOL/L (ref 98–110)
CO2 SERPL-SCNC: 28 MMOL/L (ref 21–32)
CREAT SERPL-MCNC: 1.09 MG/DL (ref 0.66–1.25)
CREAT/UREA NIT SERPL: 17 (ref 12–20)
EOSINOPHIL # BLD AUTO: 0.21 X10(3)/MCL (ref 0.04–0.36)
EOSINOPHIL NFR BLD AUTO: 3.7 % (ref 0.7–7)
ERYTHROCYTE [DISTWIDTH] IN BLOOD BY AUTOMATED COUNT: 14.7 % (ref 11–14.5)
GFR SERPLBLD CREATININE-BSD FMLA CKD-EPI: 52 ML/MIN/1.73/M2
GLOBULIN SER-MCNC: 2.7 GM/DL (ref 2–3.9)
GLUCOSE SERPL-MCNC: 97 MG/DL (ref 70–115)
GROUP & RH: NORMAL
HCT VFR BLD AUTO: 42 % (ref 36–48)
HGB BLD-MCNC: 13.5 G/DL (ref 11.8–16)
IMM GRANULOCYTES # BLD AUTO: 0.06 X10(3)/MCL (ref 0–0.03)
IMM GRANULOCYTES NFR BLD AUTO: 1.1 % (ref 0–0.5)
INDIRECT COOMBS: NORMAL
INR PPP: 1.5
LYMPHOCYTES # BLD AUTO: 2.17 X10(3)/MCL (ref 1.16–3.74)
LYMPHOCYTES NFR BLD AUTO: 38.1 % (ref 20–55)
MAGNESIUM SERPL-MCNC: 2.2 MG/DL (ref 1.8–2.4)
MCH RBC QN AUTO: 27.1 PG (ref 27–34)
MCHC RBC AUTO-ENTMCNC: 32.1 G/DL (ref 31–37)
MCV RBC AUTO: 84.2 FL (ref 79–99)
MONOCYTES # BLD AUTO: 0.29 X10(3)/MCL (ref 0.24–0.36)
MONOCYTES NFR BLD AUTO: 5.1 % (ref 4.7–12.5)
NEUTROPHILS # BLD AUTO: 2.93 X10(3)/MCL (ref 1.56–6.13)
NEUTROPHILS NFR BLD AUTO: 51.5 % (ref 37–73)
NRBC BLD AUTO-RTO: 0 %
PLATELET # BLD AUTO: 261 X10(3)/MCL (ref 140–371)
PMV BLD AUTO: 9.8 FL (ref 9.4–12.4)
POTASSIUM SERPL-SCNC: 3.8 MMOL/L (ref 3.5–5.1)
PROT SERPL-MCNC: 6.9 GM/DL (ref 6.3–8.2)
PROTHROMBIN TIME: 14.5 SECONDS (ref 9.3–11.9)
RBC # BLD AUTO: 4.99 X10(6)/MCL (ref 4–5.1)
SODIUM SERPL-SCNC: 141 MMOL/L (ref 136–145)
SPECIMEN OUTDATE: NORMAL
WBC # BLD AUTO: 5.69 X10(3)/MCL (ref 4–11.5)

## 2025-02-10 PROCEDURE — 83735 ASSAY OF MAGNESIUM: CPT

## 2025-02-10 PROCEDURE — 85610 PROTHROMBIN TIME: CPT

## 2025-02-10 PROCEDURE — 86900 BLOOD TYPING SEROLOGIC ABO: CPT | Performed by: INTERNAL MEDICINE

## 2025-02-10 PROCEDURE — 85730 THROMBOPLASTIN TIME PARTIAL: CPT

## 2025-02-10 PROCEDURE — 85025 COMPLETE CBC W/AUTO DIFF WBC: CPT

## 2025-02-10 PROCEDURE — 36415 COLL VENOUS BLD VENIPUNCTURE: CPT

## 2025-02-10 PROCEDURE — 80053 COMPREHEN METABOLIC PANEL: CPT

## 2025-08-13 ENCOUNTER — HOSPITAL ENCOUNTER (EMERGENCY)
Facility: HOSPITAL | Age: 80
Discharge: HOME | End: 2025-08-13
Attending: FAMILY MEDICINE
Payer: MEDICARE

## 2025-08-13 VITALS
WEIGHT: 198.13 LBS | BODY MASS INDEX: 33.83 KG/M2 | RESPIRATION RATE: 23 BRPM | SYSTOLIC BLOOD PRESSURE: 170 MMHG | HEART RATE: 72 BPM | HEIGHT: 64 IN | TEMPERATURE: 98 F | DIASTOLIC BLOOD PRESSURE: 84 MMHG | OXYGEN SATURATION: 95 %

## 2025-08-13 DIAGNOSIS — I50.9 CONGESTIVE HEART FAILURE, UNSPECIFIED HF CHRONICITY, UNSPECIFIED HEART FAILURE TYPE: Primary | ICD-10-CM

## 2025-08-13 DIAGNOSIS — R06.00 DYSPNEA: ICD-10-CM

## 2025-08-13 DIAGNOSIS — I10 HYPERTENSION, UNSPECIFIED TYPE: ICD-10-CM

## 2025-08-13 DIAGNOSIS — R06.02 SHORTNESS OF BREATH: ICD-10-CM

## 2025-08-13 LAB
ALBUMIN SERPL-MCNC: 3.5 G/DL (ref 3.4–4.8)
ALBUMIN/GLOB SERPL: 1.2 RATIO (ref 1.1–2)
ALP SERPL-CCNC: 83 UNIT/L (ref 40–150)
ALT SERPL-CCNC: 6 UNIT/L (ref 0–55)
ANION GAP SERPL CALC-SCNC: 10 MEQ/L
AST SERPL-CCNC: 15 UNIT/L (ref 11–45)
BASOPHILS # BLD AUTO: 0.03 X10(3)/MCL (ref 0.01–0.08)
BASOPHILS NFR BLD AUTO: 0.6 % (ref 0.1–1.2)
BILIRUB SERPL-MCNC: 0.8 MG/DL
BUN SERPL-MCNC: 11 MG/DL (ref 9.8–20.1)
CALCIUM SERPL-MCNC: 8.9 MG/DL (ref 8.4–10.2)
CHLORIDE SERPL-SCNC: 107 MMOL/L (ref 98–107)
CO2 SERPL-SCNC: 26 MMOL/L (ref 23–31)
CREAT SERPL-MCNC: 0.78 MG/DL (ref 0.55–1.02)
CREAT/UREA NIT SERPL: 14
EOSINOPHIL # BLD AUTO: 0.17 X10(3)/MCL (ref 0.04–0.36)
EOSINOPHIL NFR BLD AUTO: 3.5 % (ref 0.7–7)
ERYTHROCYTE [DISTWIDTH] IN BLOOD BY AUTOMATED COUNT: 15.1 % (ref 11–14.5)
GFR SERPLBLD CREATININE-BSD FMLA CKD-EPI: 77 ML/MIN/1.73/M2
GLOBULIN SER-MCNC: 3 GM/DL (ref 2.4–3.5)
GLUCOSE SERPL-MCNC: 109 MG/DL (ref 82–115)
HCT VFR BLD AUTO: 36.6 % (ref 36–48)
HGB BLD-MCNC: 11.9 G/DL (ref 11.8–16)
IMM GRANULOCYTES # BLD AUTO: 0.03 X10(3)/MCL (ref 0–0.03)
IMM GRANULOCYTES NFR BLD AUTO: 0.6 % (ref 0–0.5)
LYMPHOCYTES # BLD AUTO: 1.25 X10(3)/MCL (ref 1.16–3.74)
LYMPHOCYTES NFR BLD AUTO: 25.7 % (ref 20–55)
MAGNESIUM SERPL-MCNC: 2 MG/DL (ref 1.6–2.6)
MCH RBC QN AUTO: 27.6 PG (ref 27–34)
MCHC RBC AUTO-ENTMCNC: 32.5 G/DL (ref 31–37)
MCV RBC AUTO: 84.9 FL (ref 79–99)
MONOCYTES # BLD AUTO: 0.32 X10(3)/MCL (ref 0.24–0.36)
MONOCYTES NFR BLD AUTO: 6.6 % (ref 4.7–12.5)
NEUTROPHILS # BLD AUTO: 3.06 X10(3)/MCL (ref 1.56–6.13)
NEUTROPHILS NFR BLD AUTO: 63 % (ref 37–73)
NRBC BLD AUTO-RTO: 0 %
NT-PROBNP SERPL-MCNC: 4165 PG/ML
OHS QRS DURATION: 200 MS
OHS QTC CALCULATION: 503 MS
PLATELET # BLD AUTO: 154 X10(3)/MCL (ref 140–371)
PMV BLD AUTO: 10.2 FL (ref 9.4–12.4)
POTASSIUM SERPL-SCNC: 3.2 MMOL/L (ref 3.5–5.1)
PROT SERPL-MCNC: 6.5 GM/DL (ref 5.8–7.6)
RBC # BLD AUTO: 4.31 X10(6)/MCL (ref 4–5.1)
SODIUM SERPL-SCNC: 143 MMOL/L (ref 136–145)
TROPONIN I SERPL HS-MCNC: 13 NG/L
TROPONIN I SERPL HS-MCNC: 16 NG/L
WBC # BLD AUTO: 4.86 X10(3)/MCL (ref 4–11.5)

## 2025-08-13 PROCEDURE — 25000003 PHARM REV CODE 250: Performed by: FAMILY MEDICINE

## 2025-08-13 PROCEDURE — 83880 ASSAY OF NATRIURETIC PEPTIDE: CPT | Performed by: FAMILY MEDICINE

## 2025-08-13 PROCEDURE — 93010 ELECTROCARDIOGRAM REPORT: CPT | Mod: ,,, | Performed by: INTERNAL MEDICINE

## 2025-08-13 PROCEDURE — 85025 COMPLETE CBC W/AUTO DIFF WBC: CPT | Performed by: FAMILY MEDICINE

## 2025-08-13 PROCEDURE — 93005 ELECTROCARDIOGRAM TRACING: CPT

## 2025-08-13 PROCEDURE — 96375 TX/PRO/DX INJ NEW DRUG ADDON: CPT

## 2025-08-13 PROCEDURE — 80053 COMPREHEN METABOLIC PANEL: CPT | Performed by: FAMILY MEDICINE

## 2025-08-13 PROCEDURE — 84484 ASSAY OF TROPONIN QUANT: CPT | Performed by: FAMILY MEDICINE

## 2025-08-13 PROCEDURE — 63600175 PHARM REV CODE 636 W HCPCS: Performed by: FAMILY MEDICINE

## 2025-08-13 PROCEDURE — 83735 ASSAY OF MAGNESIUM: CPT | Performed by: FAMILY MEDICINE

## 2025-08-13 PROCEDURE — 99285 EMERGENCY DEPT VISIT HI MDM: CPT | Mod: 25

## 2025-08-13 PROCEDURE — 96374 THER/PROPH/DIAG INJ IV PUSH: CPT

## 2025-08-13 RX ORDER — FUROSEMIDE 10 MG/ML
60 INJECTION INTRAMUSCULAR; INTRAVENOUS
Status: COMPLETED | OUTPATIENT
Start: 2025-08-13 | End: 2025-08-13

## 2025-08-13 RX ORDER — GABAPENTIN 100 MG/1
100 CAPSULE ORAL NIGHTLY
COMMUNITY

## 2025-08-13 RX ORDER — POTASSIUM CHLORIDE 20 MEQ/1
40 TABLET, EXTENDED RELEASE ORAL
Status: COMPLETED | OUTPATIENT
Start: 2025-08-13 | End: 2025-08-13

## 2025-08-13 RX ORDER — BUSPIRONE HYDROCHLORIDE 5 MG/1
5 TABLET ORAL DAILY
COMMUNITY

## 2025-08-13 RX ORDER — HYDRALAZINE HYDROCHLORIDE 20 MG/ML
10 INJECTION INTRAMUSCULAR; INTRAVENOUS
Status: COMPLETED | OUTPATIENT
Start: 2025-08-13 | End: 2025-08-13

## 2025-08-13 RX ADMIN — HYDRALAZINE HYDROCHLORIDE 10 MG: 20 INJECTION INTRAMUSCULAR; INTRAVENOUS at 03:08

## 2025-08-13 RX ADMIN — FUROSEMIDE 60 MG: 10 INJECTION, SOLUTION INTRAMUSCULAR; INTRAVENOUS at 01:08

## 2025-08-13 RX ADMIN — POTASSIUM CHLORIDE 40 MEQ: 1500 TABLET, EXTENDED RELEASE ORAL at 01:08

## (undated) DEVICE — GLOVE PROTEXIS HYDROGEL SZ7.5

## (undated) DEVICE — NDL SPINAL 18GX3.5 SPINOCAN

## (undated) DEVICE — GLOVE PROTEXIS LTX 6.5

## (undated) DEVICE — NDL SAFETY 22G X 1.5 ECLIPSE

## (undated) DEVICE — DRESSING TRANS 4X4 TEGADERM

## (undated) DEVICE — SUT PROLENE 0 MO6 30IN BLUE

## (undated) DEVICE — SUT VICRYL COAT 910 1X36IN

## (undated) DEVICE — GLOVE PROTEXIS BLUE LATEX 7

## (undated) DEVICE — BLADE SURG CARBON STEEL #10

## (undated) DEVICE — SYR 10CC LUER LOCK

## (undated) DEVICE — WRAP COBAN NL STRL 4INX5YD

## (undated) DEVICE — GAUZE SPONGE 4X4 12PLY

## (undated) DEVICE — TUBING MEDI-VAC 20FT 0.29IN

## (undated) DEVICE — SYR ONLY LUER LOCK 20CC

## (undated) DEVICE — SEE MEDLINE ITEM 157216

## (undated) DEVICE — Device

## (undated) DEVICE — SOL NACL IRR 3000ML

## (undated) DEVICE — STAPLER SKIN ROTATING HEAD

## (undated) DEVICE — GLOVE PROTEXIS BLUE LATEX 7.5

## (undated) DEVICE — SLEEVE LATERAL TRACTION ARM

## (undated) DEVICE — SPONGE GAUZE 4X4 12 PLY STRL

## (undated) DEVICE — PAD ELECTROSURGICAL SPL W/CORD

## (undated) DEVICE — SET APEX ARTHSCP TUBE 1 CONN

## (undated) DEVICE — NDL SAFETY STD LUER 18GX1.5IN

## (undated) DEVICE — DURAPREP SURG SCRUB 26ML

## (undated) DEVICE — IMPLANTABLE DEVICE: Type: IMPLANTABLE DEVICE | Site: SHOULDER | Status: NON-FUNCTIONAL

## (undated) DEVICE — SYR LUER LOCK 1CC

## (undated) DEVICE — BLADE SURG CARBON STEEL SZ11

## (undated) DEVICE — BLADE GREAT WHITE 4.2 6/BX

## (undated) DEVICE — PACK SHOULDER

## (undated) DEVICE — IMMOBILIZER SHOULDER W/PAD XL